# Patient Record
Sex: FEMALE | Race: BLACK OR AFRICAN AMERICAN | Employment: FULL TIME | URBAN - METROPOLITAN AREA
[De-identification: names, ages, dates, MRNs, and addresses within clinical notes are randomized per-mention and may not be internally consistent; named-entity substitution may affect disease eponyms.]

---

## 2018-08-08 ENCOUNTER — HOSPITAL ENCOUNTER (EMERGENCY)
Age: 23
Discharge: HOME | End: 2018-08-08
Attending: EMERGENCY MEDICINE
Payer: SELF-PAY

## 2018-08-08 VITALS
BODY MASS INDEX: 34.41 KG/M2 | RESPIRATION RATE: 18 BRPM | SYSTOLIC BLOOD PRESSURE: 137 MMHG | OXYGEN SATURATION: 99 % | TEMPERATURE: 98.5 F | DIASTOLIC BLOOD PRESSURE: 77 MMHG | HEART RATE: 90 BPM | HEIGHT: 62 IN | WEIGHT: 187 LBS

## 2018-08-08 DIAGNOSIS — N89.8 VAGINAL DISCHARGE: ICD-10-CM

## 2018-08-08 DIAGNOSIS — N39.0 URINARY TRACT INFECTION WITHOUT HEMATURIA, SITE UNSPECIFIED: ICD-10-CM

## 2018-08-08 DIAGNOSIS — N89.8 VAGINAL LESION: Primary | ICD-10-CM

## 2018-08-08 LAB
APPEARANCE UR: ABNORMAL
BACTERIA URNS QL MICRO: ABNORMAL /HPF
BILIRUB UR QL: NEGATIVE
CLUE CELLS VAG QL WET PREP: NORMAL
COLOR UR: ABNORMAL
EPITH CASTS URNS QL MICRO: ABNORMAL /LPF
GLUCOSE UR STRIP.AUTO-MCNC: NEGATIVE MG/DL
HCG UR QL: NEGATIVE
HGB UR QL STRIP: ABNORMAL
KETONES UR QL STRIP.AUTO: NEGATIVE MG/DL
KOH PREP SPEC: NORMAL
LEUKOCYTE ESTERASE UR QL STRIP.AUTO: ABNORMAL
MUCOUS THREADS URNS QL MICRO: ABNORMAL /LPF
NITRITE UR QL STRIP.AUTO: NEGATIVE
PH UR STRIP: 5.5 [PH] (ref 5–8)
PROT UR STRIP-MCNC: NEGATIVE MG/DL
RBC #/AREA URNS HPF: ABNORMAL /HPF (ref 0–5)
SERVICE CMNT-IMP: NORMAL
SP GR UR REFRACTOMETRY: 1.03 (ref 1–1.03)
T VAGINALIS VAG QL WET PREP: NORMAL
UA: UC IF INDICATED,UAUC: ABNORMAL
UROBILINOGEN UR QL STRIP.AUTO: 0.2 EU/DL (ref 0.2–1)
WBC URNS QL MICRO: ABNORMAL /HPF (ref 0–4)

## 2018-08-08 PROCEDURE — 87491 CHLMYD TRACH DNA AMP PROBE: CPT | Performed by: PHYSICIAN ASSISTANT

## 2018-08-08 PROCEDURE — 87147 CULTURE TYPE IMMUNOLOGIC: CPT | Performed by: PHYSICIAN ASSISTANT

## 2018-08-08 PROCEDURE — 36415 COLL VENOUS BLD VENIPUNCTURE: CPT | Performed by: PHYSICIAN ASSISTANT

## 2018-08-08 PROCEDURE — 87210 SMEAR WET MOUNT SALINE/INK: CPT | Performed by: PHYSICIAN ASSISTANT

## 2018-08-08 PROCEDURE — 81001 URINALYSIS AUTO W/SCOPE: CPT | Performed by: PHYSICIAN ASSISTANT

## 2018-08-08 PROCEDURE — 87086 URINE CULTURE/COLONY COUNT: CPT | Performed by: PHYSICIAN ASSISTANT

## 2018-08-08 PROCEDURE — 74011250637 HC RX REV CODE- 250/637: Performed by: PHYSICIAN ASSISTANT

## 2018-08-08 PROCEDURE — 87255 GENET VIRUS ISOLATE HSV: CPT | Performed by: PHYSICIAN ASSISTANT

## 2018-08-08 PROCEDURE — 81025 URINE PREGNANCY TEST: CPT

## 2018-08-08 PROCEDURE — 99284 EMERGENCY DEPT VISIT MOD MDM: CPT

## 2018-08-08 PROCEDURE — 74011250636 HC RX REV CODE- 250/636: Performed by: PHYSICIAN ASSISTANT

## 2018-08-08 PROCEDURE — 96372 THER/PROPH/DIAG INJ SC/IM: CPT

## 2018-08-08 PROCEDURE — 86592 SYPHILIS TEST NON-TREP QUAL: CPT | Performed by: PHYSICIAN ASSISTANT

## 2018-08-08 RX ORDER — FLUCONAZOLE 150 MG/1
150 TABLET ORAL
Qty: 1 TAB | Refills: 0 | Status: SHIPPED | OUTPATIENT
Start: 2018-08-08 | End: 2018-08-08

## 2018-08-08 RX ORDER — CEPHALEXIN 500 MG/1
500 CAPSULE ORAL 2 TIMES DAILY
Qty: 14 CAP | Refills: 0 | Status: SHIPPED | OUTPATIENT
Start: 2018-08-08 | End: 2018-08-15

## 2018-08-08 RX ORDER — AZITHROMYCIN 500 MG/1
1000 TABLET, FILM COATED ORAL
Status: COMPLETED | OUTPATIENT
Start: 2018-08-08 | End: 2018-08-08

## 2018-08-08 RX ORDER — ACYCLOVIR 800 MG/1
800 TABLET ORAL
Qty: 35 TAB | Refills: 0 | Status: SHIPPED | OUTPATIENT
Start: 2018-08-08 | End: 2018-08-15

## 2018-08-08 RX ADMIN — AZITHROMYCIN 1000 MG: 500 TABLET, FILM COATED ORAL at 20:34

## 2018-08-08 RX ADMIN — LIDOCAINE HYDROCHLORIDE 250 MG: 10 INJECTION, SOLUTION EPIDURAL; INFILTRATION; INTRACAUDAL; PERINEURAL at 20:34

## 2018-08-08 RX ADMIN — PENICILLIN G BENZATHINE 2.4 MILLION UNITS: 1200000 INJECTION, SUSPENSION INTRAMUSCULAR at 20:34

## 2018-08-08 NOTE — ED PROVIDER NOTES
EMERGENCY DEPARTMENT HISTORY AND PHYSICAL EXAM    Date: 8/8/2018  Patient Name: Adelita Boxer    History of Presenting Illness     Chief Complaint   Patient presents with    Vaginal Discharge     x few days         History Provided By: Patient      HPI: Adelita Boxer is a 21 y.o. female with a PMH of No significant past medical history who presents with acute moderate intermittent dysuria, vaginal discharge, vaginal odor, and possible genital lesion x 1 week secondary to unprotected intercourse with new male partner. Endorses concern for STI. No modifying factors. Denies fever, chills, n/v, abd pain, urinary frequency, headache, lightheadedness, dizziness, joint pain. PCP: None    Current Outpatient Prescriptions   Medication Sig Dispense Refill    acyclovir (ZOVIRAX) 800 mg tablet Take 1 Tab by mouth five (5) times daily for 7 days. 35 Tab 0    cephALEXin (KEFLEX) 500 mg capsule Take 1 Cap by mouth two (2) times a day for 7 days. 14 Cap 0       Past History     Past Medical History:  History reviewed. No pertinent past medical history. Past Surgical History:  History reviewed. No pertinent surgical history. Family History:  History reviewed. No pertinent family history. Social History:  Social History   Substance Use Topics    Smoking status: Current Some Day Smoker     Packs/day: 0.25    Smokeless tobacco: None    Alcohol use Yes      Comment: occassion. Allergies:  No Known Allergies      Review of Systems   Review of Systems   Constitutional: Negative. Negative for activity change, appetite change, chills and fever. HENT: Negative. Negative for congestion, ear pain, postnasal drip and sore throat. Eyes: Negative. Negative for pain and visual disturbance. Respiratory: Negative. Negative for cough and shortness of breath. Cardiovascular: Negative. Negative for chest pain.    Gastrointestinal: Negative for abdominal pain, anal bleeding, diarrhea, nausea, rectal pain and vomiting. Genitourinary: Positive for dysuria, genital sores, vaginal discharge and vaginal pain. Negative for difficulty urinating, flank pain, frequency, menstrual problem, pelvic pain, urgency and vaginal bleeding. Musculoskeletal: Negative. Negative for joint swelling. Skin: Negative. Negative for rash. Neurological: Negative. Negative for dizziness, light-headedness and headaches. Psychiatric/Behavioral: Negative. Physical Exam     Vitals:    08/08/18 1929   BP: 137/77   Pulse: 90   Resp: 18   Temp: 98.5 °F (36.9 °C)   SpO2: 99%   Weight: 84.8 kg (187 lb)   Height: 5' 2\" (1.575 m)     Physical Exam   Constitutional: She is oriented to person, place, and time. She appears well-developed and well-nourished. No distress. HENT:   Head: Normocephalic and atraumatic. Right Ear: External ear normal.   Left Ear: External ear normal.   Eyes: Conjunctivae and EOM are normal. Pupils are equal, round, and reactive to light. Neck: Normal range of motion. Pulmonary/Chest: Effort normal.   Abdominal: Soft. Bowel sounds are normal. There is no tenderness. Genitourinary: Uterus normal. Pelvic exam was performed with patient supine. There is no rash, tenderness, lesion or injury on the right labia. There is no rash, tenderness, lesion or injury on the left labia. Cervix exhibits no motion tenderness, no discharge and no friability. Right adnexum displays no mass, no tenderness and no fullness. Left adnexum displays no mass, no tenderness and no fullness. No erythema, tenderness or bleeding in the vagina. No foreign body in the vagina. No signs of injury around the vagina. Vaginal discharge found. Musculoskeletal: Normal range of motion. Neurological: She is alert and oriented to person, place, and time. Skin: Skin is warm and dry. She is not diaphoretic. Psychiatric: She has a normal mood and affect.  Her behavior is normal. Judgment and thought content normal.   Nursing note and vitals reviewed. Diagnostic Study Results     Labs -     Recent Results (from the past 12 hour(s))   HCG URINE, QL. - POC    Collection Time: 08/08/18  7:58 PM   Result Value Ref Range    Pregnancy test,urine (POC) NEGATIVE  NEG     URINALYSIS W/ REFLEX CULTURE    Collection Time: 08/08/18  8:13 PM   Result Value Ref Range    Color YELLOW/STRAW      Appearance CLOUDY (A) CLEAR      Specific gravity 1.030 1.003 - 1.030      pH (UA) 5.5 5.0 - 8.0      Protein NEGATIVE  NEG mg/dL    Glucose NEGATIVE  NEG mg/dL    Ketone NEGATIVE  NEG mg/dL    Bilirubin NEGATIVE  NEG      Blood TRACE (A) NEG      Urobilinogen 0.2 0.2 - 1.0 EU/dL    Nitrites NEGATIVE  NEG      Leukocyte Esterase MODERATE (A) NEG      WBC 10-20 0 - 4 /hpf    RBC 5-10 0 - 5 /hpf    Epithelial cells MODERATE (A) FEW /lpf    Bacteria 3+ (A) NEG /hpf    UA:UC IF INDICATED URINE CULTURE ORDERED (A) CNI      Mucus 1+ (A) NEG /lpf   WET PREP    Collection Time: 08/08/18  8:13 PM   Result Value Ref Range    Clue cells CLUE CELLS ABSENT      Wet prep NO TRICHOMONAS SEEN     KOH, OTHER SOURCES    Collection Time: 08/08/18  8:13 PM   Result Value Ref Range    Special Requests: NO SPECIAL REQUESTS      KOH NO YEAST SEEN         Radiologic Studies -   No orders to display     CT Results  (Last 48 hours)    None        CXR Results  (Last 48 hours)    None            Medical Decision Making   I am the first provider for this patient. I reviewed the vital signs, available nursing notes, past medical history, past surgical history, family history and social history. Vital Signs-Reviewed the patient's vital signs. Records Reviewed: Nursing Notes, Old Medical Records and Previous Laboratory Studies    ED Course:     Disposition:    DISCHARGE NOTE:   8:39 PM      Care plan outlined and precautions discussed. Patient has no new complaints, changes, or physical findings. Results of labs were reviewed with the patient.  All medications were reviewed with the patient; will d/c home with acyclovir and keflex. All of pt's questions and concerns were addressed. Patient was instructed and agrees to follow up with GYN, as well as to return to the ED upon further deterioration. Patient is ready to go home. Follow-up Information     Follow up With Details Comments 98 Radha Contreras NP Schedule an appointment as soon as possible for a visit in 1 week As needed, If symptoms worsen [address] [place] [address]  [phone]            Current Discharge Medication List      START taking these medications    Details   acyclovir (ZOVIRAX) 800 mg tablet Take 1 Tab by mouth five (5) times daily for 7 days. Qty: 35 Tab, Refills: 0      cephALEXin (KEFLEX) 500 mg capsule Take 1 Cap by mouth two (2) times a day for 7 days. Qty: 14 Cap, Refills: 0             Provider Notes (Medical Decision Making):   DDX: bv, vaginal candidiasis, sti, pid, uti, pregnancy    Procedures:  Procedures        Diagnosis     Clinical Impression:   1. Vaginal lesion    2. Vaginal discharge    3.  Urinary tract infection without hematuria, site unspecified

## 2018-08-09 LAB — RPR SER QL: NONREACTIVE

## 2018-08-09 NOTE — DISCHARGE INSTRUCTIONS
Urinary Tract Infection in Women: Care Instructions  Your Care Instructions    A urinary tract infection, or UTI, is a general term for an infection anywhere between the kidneys and the urethra (where urine comes out). Most UTIs are bladder infections. They often cause pain or burning when you urinate. UTIs are caused by bacteria and can be cured with antibiotics. Be sure to complete your treatment so that the infection goes away. Follow-up care is a key part of your treatment and safety. Be sure to make and go to all appointments, and call your doctor if you are having problems. It's also a good idea to know your test results and keep a list of the medicines you take. How can you care for yourself at home? · Take your antibiotics as directed. Do not stop taking them just because you feel better. You need to take the full course of antibiotics. · Drink extra water and other fluids for the next day or two. This may help wash out the bacteria that are causing the infection. (If you have kidney, heart, or liver disease and have to limit fluids, talk with your doctor before you increase your fluid intake.)  · Avoid drinks that are carbonated or have caffeine. They can irritate the bladder. · Urinate often. Try to empty your bladder each time. · To relieve pain, take a hot bath or lay a heating pad set on low over your lower belly or genital area. Never go to sleep with a heating pad in place. To prevent UTIs  · Drink plenty of water each day. This helps you urinate often, which clears bacteria from your system. (If you have kidney, heart, or liver disease and have to limit fluids, talk with your doctor before you increase your fluid intake.)  · Urinate when you need to. · Urinate right after you have sex. · Change sanitary pads often. · Avoid douches, bubble baths, feminine hygiene sprays, and other feminine hygiene products that have deodorants.   · After going to the bathroom, wipe from front to back.  When should you call for help? Call your doctor now or seek immediate medical care if:    · Symptoms such as fever, chills, nausea, or vomiting get worse or appear for the first time.     · You have new pain in your back just below your rib cage. This is called flank pain.     · There is new blood or pus in your urine.     · You have any problems with your antibiotic medicine.    Watch closely for changes in your health, and be sure to contact your doctor if:    · You are not getting better after taking an antibiotic for 2 days.     · Your symptoms go away but then come back. Where can you learn more? Go to http://kyle-nohemi.info/. Enter F065 in the search box to learn more about \"Urinary Tract Infection in Women: Care Instructions. \"  Current as of: May 12, 2017  Content Version: 11.7  © 0018-9324 [employer]. Care instructions adapted under license by [employer] (which disclaims liability or warranty for this information). If you have questions about a medical condition or this instruction, always ask your healthcare professional. [place] [address] any warranty or liability for your use of this information. Genital Herpes: Care Instructions  Your Care Instructions  Genital herpes is caused by a virus called herpes simplex. There are two types of this virus. Type 2 is the type that usually causes genital herpes. But type 1 can also cause it. Type 1 is the type that causes cold sores. Genital herpes is a sexually transmitted infection (STI). The most common way to get it is through sexual or other contact with someone who has herpes. For example, the virus can spread from a sore in the genital area to the lips. And it can spread from a cold sore on the lips to the genital area. Some people are surprised to find out that they have herpes or that they gave it to someone else.  This is because a lot of people who have it don't know that they have it. They may not get sores or they may have sores that they cannot see. But the virus can still be spread by a person who does not have obvious sores or symptoms. There is no cure for herpes, but antiviral medicine can help you feel better and help prevent more outbreaks. This medicine may also lower the chance of spreading the virus. Follow-up care is a key part of your treatment and safety. Be sure to make and go to all appointments, and call your doctor if you are having problems. It's also a good idea to know your test results and keep a list of the medicines you take. How can you care for yourself at home? · Be safe with medicines. If your doctor prescribes medicine, take it exactly as prescribed. Call your doctor if you think you are having a problem with your medicine. You will get more details on the specific medicines your doctor prescribes. · To reduce the pain and itching from herpes sores:  ¨ Wash the area with water 3 or 4 times a day. ¨ Keep the sores clean and dry in between baths or showers. You may want to let the sores air dry. This may feel better than a towel. ¨ Wear cotton underwear. Cotton absorbs moisture well. ¨ Take an over-the-counter pain medicine, such as acetaminophen (Tylenol), ibuprofen (Advil, Motrin), or naproxen (Aleve). Read and follow all instructions on the label. Do not take two or more pain medicines at the same time unless the doctor told you to. Many pain medicines have acetaminophen, which is Tylenol. Too much acetaminophen (Tylenol) can be harmful. To prevent the spread of genital herpes  · Latex condoms are a good way to reduce the risk of spreading the virus. But you can still get the virus even if you use a condom. For the best protection, use condoms every time you have sex, from the beginning to the end of sexual contact. Remember that you can infect someone even if you do not have obvious symptoms or sores.   · Avoid sexual contact when you have symptoms. Symptoms include sores, tingling, or pain. · Wash your hands if you touch a sore. In some cases, especially if this is your first herpes outbreak, you can spread the virus to other parts of your body or to other people. · Having one sex partner (who does not have STIs and does not have sex with anyone else) is a good way to avoid STIs. · Talk to your sex partner or partners about genital herpes. · Encourage your sex partners to get a blood test to see if they have been infected. When should you call for help? Call your doctor now or seek immediate medical care if:    · You have a new fever.     · There is increasing redness or red streaks around herpes sores.    Watch closely for changes in your health, and be sure to contact your doctor if:    · You have herpes and you think you might be pregnant.     · You have an outbreak of herpes sores, and the sores are not healing.     · You have frequent outbreaks of genital herpes sores.     · You are unable to pass urine or are constipated.     · You want to start antiviral medicine.     · You do not get better as expected. Where can you learn more? Go to http://kyle-nohemi.info/. Enter V726 in the search box to learn more about \"Genital Herpes: Care Instructions. \"  Current as of: November 27, 2017  Content Version: 11.7  © 5439-9480 [employer]. Care instructions adapted under license by [employer] (which disclaims liability or warranty for this information). If you have questions about a medical condition or this instruction, always ask your healthcare professional. [place] [address] any warranty or liability for your use of this information.

## 2018-08-09 NOTE — ED NOTES
Patient presents to ED with c/o vaginal discharge. Patient states that she has been having vaginal discharge for 2 days. Patient also states that she felt a lesion inside vagina. Concerned for STD and want to be tested for herpes and syphilis. Emergency Department Nursing Plan of Care       The Nursing Plan of Care is developed from the Nursing assessment and Emergency Department Attending provider initial evaluation. The plan of care may be reviewed in the ED Provider note.     The Plan of Care was developed with the following considerations:   Patient / Family readiness to learn indicated by:verbalized understanding  Persons(s) to be included in education: patient  Barriers to Learning/Limitations:No    Signed     Carrie Simon RN    8/8/2018   8:03 PM

## 2018-08-10 LAB
BACTERIA SPEC CULT: ABNORMAL
BACTERIA SPEC CULT: ABNORMAL
C TRACH DNA SPEC QL NAA+PROBE: NEGATIVE
CC UR VC: ABNORMAL
N GONORRHOEA DNA SPEC QL NAA+PROBE: NEGATIVE
SAMPLE TYPE: NORMAL
SERVICE CMNT-IMP: ABNORMAL
SERVICE CMNT-IMP: NORMAL
SPECIMEN SOURCE: NORMAL

## 2018-08-12 LAB
HSV SPEC CULT: ABNORMAL
SPECIMEN SOURCE: ABNORMAL

## 2018-08-12 NOTE — PROGRESS NOTES
Spoke with the pt and advised her of her results. She was given acyclovir on d/c. She said she would f/u w/ the pcp provided w/ her d/c paperwork. I told her to have her partners tested if they have a similar rash and she agreed. I also explained that she is contagious while she has this lesion and she acknowledged this.

## 2018-08-29 ENCOUNTER — HOSPITAL ENCOUNTER (EMERGENCY)
Age: 23
End: 2018-08-29
Attending: EMERGENCY MEDICINE
Payer: SELF-PAY

## 2018-08-29 PROCEDURE — 75810000275 HC EMERGENCY DEPT VISIT NO LEVEL OF CARE

## 2019-10-30 ENCOUNTER — HOSPITAL ENCOUNTER (EMERGENCY)
Age: 24
Discharge: HOME | End: 2019-10-30
Attending: EMERGENCY MEDICINE
Payer: SELF-PAY

## 2019-10-30 VITALS
RESPIRATION RATE: 20 BRPM | HEART RATE: 90 BPM | BODY MASS INDEX: 33.86 KG/M2 | DIASTOLIC BLOOD PRESSURE: 75 MMHG | WEIGHT: 184 LBS | OXYGEN SATURATION: 100 % | HEIGHT: 62 IN | TEMPERATURE: 98.3 F | SYSTOLIC BLOOD PRESSURE: 128 MMHG

## 2019-10-30 DIAGNOSIS — J02.0 ACUTE STREPTOCOCCAL PHARYNGITIS: Primary | ICD-10-CM

## 2019-10-30 DIAGNOSIS — M25.562 ARTHRALGIA OF BOTH LOWER LEGS: ICD-10-CM

## 2019-10-30 DIAGNOSIS — M25.561 ARTHRALGIA OF BOTH LOWER LEGS: ICD-10-CM

## 2019-10-30 PROCEDURE — 99282 EMERGENCY DEPT VISIT SF MDM: CPT

## 2019-10-30 RX ORDER — AMOXICILLIN 500 MG/1
500 TABLET, FILM COATED ORAL 3 TIMES DAILY
Qty: 30 TAB | Refills: 0 | Status: SHIPPED | OUTPATIENT
Start: 2019-10-30 | End: 2019-11-09

## 2019-10-30 RX ORDER — IBUPROFEN 800 MG/1
800 TABLET ORAL
Qty: 20 TAB | Refills: 0 | Status: SHIPPED | OUTPATIENT
Start: 2019-10-30 | End: 2019-11-06

## 2019-10-30 RX ORDER — CYCLOBENZAPRINE HCL 10 MG
10 TABLET ORAL
Qty: 20 TAB | Refills: 0 | Status: SHIPPED | OUTPATIENT
Start: 2019-10-30 | End: 2020-04-28

## 2019-10-30 NOTE — LETTER
[facility] EMERGENCY DEPT 
[address] [address] 
[phone] Work/School Note Date: 10/30/2019 To Whom It May concern: 
 
Surya Alvarado was seen and treated today in the emergency room by the following provider(s): 
Attending Provider: Lisa You MD 
Physician Assistant: MARC Lomas. Surya Alvarado may return to work on 11/2/2019. Sincerely, 
 
 
 
 
Gilma Jacobs  [place], [facility] [place]

## 2019-10-31 NOTE — ED TRIAGE NOTES
Pt comes in with c/o generalized body aches (mostly in bilateral legs) and L-sided sore throat since this AM. Pt denies vomiting, diarrhea, or fever. Pt works 2 jobs and had to miss one today.

## 2019-10-31 NOTE — DISCHARGE INSTRUCTIONS
Patient Education        Joint Pain: Care Instructions  Your Care Instructions    Many people have small aches and pains from overuse or injury to muscles and joints. Joint injuries often happen during sports or recreation, work tasks, or projects around the home. An overuse injury can happen when you put too much stress on a joint or when you do an activity that stresses the joint over and over, such as using the computer or rowing a boat. You can take action at home to help your muscles and joints get better. You should feel better in 1 to 2 weeks, but it can take 3 months or more to heal completely. Follow-up care is a key part of your treatment and safety. Be sure to make and go to all appointments, and call your doctor if you are having problems. It's also a good idea to know your test results and keep a list of the medicines you take. How can you care for yourself at home? · Do not put weight on the injured joint for at least a day or two. · For the first day or two after an injury, do not take hot showers or baths, and do not use hot packs. The heat could make swelling worse. · Put ice or a cold pack on the sore joint for 10 to 20 minutes at a time. Try to do this every 1 to 2 hours for the next 3 days (when you are awake) or until the swelling goes down. Put a thin cloth between the ice and your skin. · Wrap the injury in an elastic bandage. Do not wrap it too tightly because this can cause more swelling. · Prop up the sore joint on a pillow when you ice it or anytime you sit or lie down during the next 3 days. Try to keep it above the level of your heart. This will help reduce swelling. · Take an over-the-counter pain medicine, such as acetaminophen (Tylenol), ibuprofen (Advil, Motrin), or naproxen (Aleve). Read and follow all instructions on the label. · After 1 or 2 days of rest, begin moving the joint gently.  While the joint is still healing, you can begin to exercise using activities that do not strain or hurt the painful joint. When should you call for help? Call your doctor now or seek immediate medical care if:    · You have signs of infection, such as:  ? Increased pain, swelling, warmth, and redness. ? Red streaks leading from the joint. ? A fever.    Watch closely for changes in your health, and be sure to contact your doctor if:    · Your movement or symptoms are not getting better after 1 to 2 weeks of home treatment. Where can you learn more? Go to http://kyle-nohemi.info/. Enter P205 in the search box to learn more about \"Joint Pain: Care Instructions. \"  Current as of: June 26, 2019  Content Version: 12.2  © 6754-9306 [employer]. Care instructions adapted under license by [employer] (which disclaims liability or warranty for this information). If you have questions about a medical condition or this instruction, always ask your healthcare professional. [place] [address] any warranty or liability for your use of this information. Patient Education        Strep Throat: Care Instructions  Your Care Instructions    Strep throat is a bacterial infection that causes sudden, severe sore throat and fever. Strep throat, which is caused by bacteria called streptococcus, is treated with antibiotics. Sometimes a strep test is necessary to tell if the sore throat is caused by strep bacteria. Treatment can help ease symptoms and may prevent future problems. Follow-up care is a key part of your treatment and safety. Be sure to make and go to all appointments, and call your doctor if you are having problems. It's also a good idea to know your test results and keep a list of the medicines you take. How can you care for yourself at home? · Take your antibiotics as directed. Do not stop taking them just because you feel better. You need to take the full course of antibiotics.   · Strep throat can spread to others until 24 hours after you begin taking antibiotics. During this time, you should avoid contact with other people at work or home, especially infants and children. Do not sneeze or cough on others, and wash your hands often. Keep your drinking glass and eating utensils separate from those of others, and wash these items well in hot, soapy water. · Gargle with warm salt water at least once each hour to help reduce swelling and make your throat feel better. Use 1 teaspoon of salt mixed in 8 fluid ounces of warm water. · Take an over-the-counter pain medication, such as acetaminophen (Tylenol), ibuprofen (Advil, Motrin), or naproxen (Aleve). Read and follow all instructions on the label. · Try an over-the-counter anesthetic throat spray or throat lozenges, which may help relieve throat pain. · Drink plenty of fluids. Fluids may help soothe an irritated throat. Hot fluids, such as tea or soup, may help your throat feel better. · Eat soft solids and drink plenty of clear liquids. Flavored ice pops, ice cream, scrambled eggs, sherbet, and gelatin dessert (such as Jell-O) may also soothe the throat. · Get lots of rest.  · Do not smoke, and avoid secondhand smoke. If you need help quitting, talk to your doctor about stop-smoking programs and medicines. These can increase your chances of quitting for good. · Use a vaporizer or humidifier to add moisture to the air in your bedroom. Follow the directions for cleaning the machine. When should you call for help?   Call your doctor now or seek immediate medical care if:    · You have a new or higher fever.     · You have a fever with a stiff neck or severe headache.     · You have new or worse trouble swallowing.     · Your sore throat gets much worse on one side.     · Your pain becomes much worse on one side of your throat.    Watch closely for changes in your health, and be sure to contact your doctor if:    · You are not getting better after 2 days (48 hours).     · You do not get better as expected. Where can you learn more? Go to http://kyle-nohemi.info/. Enter K625 in the search box to learn more about \"Strep Throat: Care Instructions. \"  Current as of: October 21, 2018  Content Version: 12.2  © 9040-4506 [employer]. Care instructions adapted under license by [employer] (which disclaims liability or warranty for this information). If you have questions about a medical condition or this instruction, always ask your healthcare professional. [address] any warranty or liability for your use of this information.

## 2019-10-31 NOTE — ED NOTES
Ambulatory patient arrives with complaints of sore throat and missed work. She additionally reports her legs have been sore. Pt stable. Call bell in reach. Safety as documented. Emergency Department Nursing Plan of Care       The Nursing Plan of Care is developed from the Nursing assessment and Emergency Department Attending provider initial evaluation. The plan of care may be reviewed in the ED Provider note.     The Plan of Care was developed with the following considerations:   Patient / Family readiness to learn indicated by:verbalized understanding  Persons(s) to be included in education: patient  Barriers to Learning/Limitations:Erika Barber, RN    10/30/2019   9:24 PM

## 2019-10-31 NOTE — ED PROVIDER NOTES
EMERGENCY DEPARTMENT HISTORY AND PHYSICAL EXAM      Date: 10/30/2019  Patient Name: Glen Sutherland    Please note that this dictation was completed with ScoreGrid, the computer voice recognition software. Quite often unanticipated grammatical, syntax, homophones, and other interpretive errors are inadvertently transcribed by the computer software. Please disregard these errors. Please excuse any errors that have escaped final proofreading. History of Presenting Illness     Chief Complaint   Patient presents with    Sore Throat       History Provided By: Patient    HPI: Glen Sutherland, 25 y.o. female with PMHx significant for tobacco abuse, presents ambulatory to the ED with cc of sore throat x 2 days. Pt sates that her boyfriend is sick with similar symptoms. She states that her pain worsens when she swallows. Denies medication PTA. Denies nausea, vomiting, voice changes, fever, chills. Pt also reports ongoing chronic bilateral leg pain x 1 year or more. Pt states that her pain occurs every morning when she initially gets out of bed and improves throughout the day with activity. She states that she has been working two jobs for the last two years and is required to be on her feet the whole time while at work. Denies med PTA for this issue. Of note, pt states that her LMP was 2 days ago. PCP: None    There are no other complaints, changes, or physical findings at this time. Current Outpatient Medications   Medication Sig Dispense Refill    amoxicillin 500 mg tab Take 500 mg by mouth three (3) times daily for 10 days. 30 Tab 0    cyclobenzaprine (FLEXERIL) 10 mg tablet Take 1 Tab by mouth three (3) times daily as needed for Muscle Spasm(s). 20 Tab 0    ibuprofen (MOTRIN) 800 mg tablet Take 1 Tab by mouth every six (6) hours as needed for Pain for up to 7 days. 20 Tab 0       Past History     Past Medical History:  No past medical history on file.     Past Surgical History:  No past surgical history on file. Family History:  No family history on file. Social History:  Social History     Tobacco Use    Smoking status: Current Some Day Smoker     Packs/day: 0.25   Substance Use Topics    Alcohol use: Yes     Comment: occassion.  Drug use: No       Allergies:  No Known Allergies      Review of Systems   Review of Systems   Constitutional: Negative. Negative for activity change, appetite change, chills and fever. HENT: Positive for sore throat. Negative for rhinorrhea, trouble swallowing and voice change. Eyes: Negative for pain and visual disturbance. Respiratory: Negative for cough and shortness of breath. Cardiovascular: Negative for chest pain and palpitations. Gastrointestinal: Negative for abdominal pain, diarrhea, nausea and vomiting. Genitourinary: Negative for dysuria and hematuria. Musculoskeletal: Positive for arthralgias and myalgias. Skin: Negative for color change, rash and wound. Neurological: Negative for dizziness, numbness and headaches. All other systems reviewed and are negative. Physical Exam   Physical Exam   Constitutional: She is oriented to person, place, and time. Vital signs are normal. She appears well-developed and well-nourished. No distress. 25 y.o. female in NAD  Communicates appropriately and in full sentences  Normal vital signs   HENT:   Head: Normocephalic and atraumatic. Bilateral tonsill(s) enlarged with erythema and exudates  Currently tolerating secretions without trismus or stridor  No acute voice changes  Uvula midline without evidence of RPA or PTA   Eyes: Pupils are equal, round, and reactive to light. Conjunctivae are normal. Right eye exhibits no discharge. Left eye exhibits no discharge. Neck: Normal range of motion. Neck supple. No nuchal rigidity   Cardiovascular: Normal rate, regular rhythm and intact distal pulses. Pulmonary/Chest: Effort normal and breath sounds normal. No respiratory distress. She has no wheezes. Abdominal: Soft. Bowel sounds are normal. She exhibits no distension. There is no tenderness. Musculoskeletal: Normal range of motion. She exhibits no edema, tenderness or deformity. No neurologic or vascular compromise on exam.   No bony TTP of bilateral lower legs   Neurological: She is alert and oriented to person, place, and time. Coordination normal.   Skin: Skin is warm and dry. She is not diaphoretic. No erythema. Psychiatric: She has a normal mood and affect. Nursing note and vitals reviewed. Diagnostic Study Results     No formal testing initiated. Medical Decision Making   I am the first provider for this patient. I reviewed the vital signs, available nursing notes, past medical history, past surgical history, family history and social history. Vital Signs-Reviewed the patient's vital signs. Patient Vitals for the past 12 hrs:   Temp Pulse Resp BP SpO2   10/30/19 2043 98.8 °F (37.1 °C) 89 18 (!) 125/94 100 %         Records Reviewed: Nursing Notes and Old Medical Records    Provider Notes (Medical Decision Making):   DDx: bacterial vs. viral pharyngitis, viral syndrome, upper respiratory infection No evidence of retropharyngeal abscess or peritonsillar abscess. Pt presents with sore throat with tonsillar exudates and no fever. Also reporting chronic leg pain that she only experiences in the morning x 1 year or more. Pt denies meds PTA. Denies injury or trauma. Will treat symptomatically and urge close follow-up. Pt expresses understanding. Provided customary ED return precautions. ED Course:   Initial assessment performed. The patients presenting problems have been discussed, and they are in agreement with the care plan formulated and outlined with them. I have encouraged them to ask questions as they arise throughout their visit. DISCHARGE NOTE:  Ken Kent  results have been reviewed with her. She has been counseled regarding her diagnosis.   She verbally conveys understanding and agreement of the signs, symptoms, diagnosis, treatment and prognosis and additionally agrees to follow up as recommended with Dr. None in 24 - 48 hours. She also agrees with the care-plan and conveys that all of her questions have been answered. I have also put together some discharge instructions for her that include: 1) educational information regarding their diagnosis, 2) how to care for their diagnosis at home, as well a 3) list of reasons why they would want to return to the ED prior to their follow-up appointment, should their condition change. She and/or family's questions have been answered. I have encouraged them to see the official results in [facility]\" or to retrieve the specifics of their results from medical records. PLAN:  1. Return precautions as discussed  2. Follow-up with providers as directed  3. Medications as prescribed    Return to ED if worse     Diagnosis     Clinical Impression:   1. Acute streptococcal pharyngitis    2. Arthralgia of both lower legs        Current Discharge Medication List      START taking these medications    Details   amoxicillin 500 mg tab Take 500 mg by mouth three (3) times daily for 10 days. Qty: 30 Tab, Refills: 0      cyclobenzaprine (FLEXERIL) 10 mg tablet Take 1 Tab by mouth three (3) times daily as needed for Muscle Spasm(s). Qty: 20 Tab, Refills: 0      ibuprofen (MOTRIN) 800 mg tablet Take 1 Tab by mouth every six (6) hours as needed for Pain for up to 7 days. Qty: 20 Tab, Refills: 0             Follow-up Information     Follow up With Specialties Details Why [address] [facility] EMERGENCY DEPT Emergency Medicine Go to If symptoms worsen Daniel Aragon    [facility]  Call If symptoms worsen [address]  [phone]              This note will not be viewable in [address]

## 2020-04-28 ENCOUNTER — HOSPITAL ENCOUNTER (EMERGENCY)
Age: 25
Discharge: HOME | End: 2020-04-28
Attending: EMERGENCY MEDICINE

## 2020-04-28 VITALS
OXYGEN SATURATION: 100 % | DIASTOLIC BLOOD PRESSURE: 74 MMHG | HEART RATE: 82 BPM | SYSTOLIC BLOOD PRESSURE: 126 MMHG | WEIGHT: 198 LBS | BODY MASS INDEX: 36.44 KG/M2 | HEIGHT: 62 IN | TEMPERATURE: 99.3 F | RESPIRATION RATE: 18 BRPM

## 2020-04-28 DIAGNOSIS — R05.9 COUGH: Primary | ICD-10-CM

## 2020-04-28 DIAGNOSIS — R51.9 NONINTRACTABLE HEADACHE, UNSPECIFIED CHRONICITY PATTERN, UNSPECIFIED HEADACHE TYPE: ICD-10-CM

## 2020-04-28 PROCEDURE — 99283 EMERGENCY DEPT VISIT LOW MDM: CPT

## 2020-04-28 NOTE — ED NOTES
Pt presents to ED ambulatory complaining of headache x2 days. Pt reports taking tylenol with some relief of symptoms, reports she needs to make sure she is okay to go to work. Pt is alert and oriented x 4, RR even and unlabored, skin is warm and dry. Assessment completed and pt updated on plan of care. Emergency Department Nursing Plan of Care       The Nursing Plan of Care is developed from the Nursing assessment and Emergency Department Attending provider initial evaluation. The plan of care may be reviewed in the ED Provider note.     The Plan of Care was developed with the following considerations:   Patient / Family readiness to learn indicated by:verbalized understanding  Persons(s) to be included in education: patient  Barriers to Learning/Limitations:No    Signed     Caitlin Heller RN    4/28/2020   1:40 PM

## 2020-04-28 NOTE — ED PROVIDER NOTES
EMERGENCY DEPARTMENT HISTORY AND PHYSICAL EXAM      Date: 4/28/2020  Patient Name: Ezequiel Cazares    History of Presenting Illness     Chief Complaint   Patient presents with    Headache       History Provided By: Patient    HPI: Ezequiel Cazares, 22 y.o. female with PMHx as noted below presents the emergency department with chief complaint of cough, headache and concern for COVID-19. Patient states she works in [employer] and has been having some cough, congestion and intermittent headaches for the last 2 days. Patient has no known sick contacts. She reports the headache is mild, dull intermittent, no headache currently. She has no visual changes or focal neurologic deficits. Also denying any neck stiffness. She has had no fevers at home and denies any purulent sputum production. Also denying any dyspnea at this time. PCP: None        Past History     Past Medical History:  History reviewed. No pertinent past medical history. Past Surgical History:  History reviewed. No pertinent surgical history. Family History:  History reviewed. No pertinent family history. Social History:  Social History     Tobacco Use    Smoking status: Current Some Day Smoker     Packs/day: 0.25    Smokeless tobacco: Never Used   Substance Use Topics    Alcohol use: Yes     Comment: occassion.  Drug use: No       Allergies:  No Known Allergies      Review of Systems   Review of Systems  Constitutional: Negative for fever, chills. Positive fatigue. HENT: Negative for congestion, sore throat, rhinorrhea, sneezing and neck stiffness   Eyes: Negative for discharge and redness. Respiratory: Negative for  shortness of breath, wheezing   Cardiovascular: Negative for chest pain, palpitations   Gastrointestinal: Negative for nausea, vomiting, abdominal pain, constipation, diarrhea and blood in stool.    Genitourinary: Negative for dysuria, hematuria, flank pain, decreased urine volume, discharge,   Musculoskeletal: Negative for myalgias or joint pain . Skin: Negative for rash or lesions . Neurological: Negative weakness, light-headedness, numbness. Positive headaches. Physical Exam   Physical Exam    GENERAL: alert and oriented, no acute distress  EYES: PEERL, No injection, discharge or icterus. ENT: Mucous membranes pink and moist.  NECK: Supple  LUNGS: Airway patent. Non-labored respirations. Breath sounds clear with good air entry bilaterally. HEART: Regular rate and rhythm. No peripheral edema  ABDOMEN: Non-distended and non-tender, without guarding or rebound. SKIN:  warm, dry  MSK/EXTREMITIES: Without swelling, tenderness or deformity, symmetric with normal ROM  NEUROLOGICAL:  alert and oriented x 3, CN II-XII grossly intact, strength 5/5 bilateral upper and lower extremities, sensation intact throughout to light touch, no dysmetria or ataxia noted        Diagnostic Study Results     Labs -   No results found for this or any previous visit (from the past 12 hour(s)). Radiologic Studies -   No orders to display     CT Results  (Last 48 hours)    None        CXR Results  (Last 48 hours)    None            Medical Decision Making   I, Tony Dailey MD am the first provider for this patient and am the attending of record for this patient encounter. I reviewed the vital signs, available nursing notes, past medical history, past surgical history, family history and social history. Vital Signs-Reviewed the patient's vital signs. Patient Vitals for the past 12 hrs:   Temp Pulse Resp BP SpO2   04/28/20 1304  82 18 126/74 100 %   04/28/20 1125 99.3 °F (37.4 °C) 78 18 130/85 99 %       Records Reviewed: Nursing Notes and Old Medical Records    Provider Notes (Medical Decision Making): On presentation, the patient is well appearing, in no acute distress with reassuring vital signs. Based on my history and exam the differential diagnosis for this patient includes COVID 19, URI, sinusitis, bronchitis.  Nothing to suggest strep pharyngitis, PNA or bacterial sinusitis. Patient may have COVID 19, however is breathing comfortably, maintaining adequate O2 sats on room air and is overall well appearing so does not require admission at this time. They have had no recent travel, contact with a COVID + patient and have no conditoins that would place them at high risk for more severe disease so will not test at this time so will not obtain formal test per our facility policy. Have recommended self quarantine per CDC guidelines. Symptomatic therapy suggested. Should return immediately to the emergency department develops shortness of breath, confusion, weakness or any othe new/worrisome symptoms     ED Course:   Initial assessment performed. The patients presenting problems have been discussed, and they are in agreement with the care plan formulated and outlined with them. I have encouraged them to ask questions as they arise throughout their visit. PROGRESS  Tawana Garland  results have been reviewed with her. She has been counseled regarding her diagnosis. She verbally conveys understanding and agreement of the signs, symptoms, diagnosis, treatment and prognosis and additionally agrees to follow up as recommended. She also agrees with the care-plan and conveys that all of her questions have been answered. I have also put together some discharge instructions for her that include: 1) educational information regarding their diagnosis, 2) how to care for their diagnosis at home, as well a 3) list of reasons why they would want to return to the ED prior to their follow-up appointment, should their condition change. Disposition:  home    PLAN:  1. There are no discharge medications for this patient.     2.   Follow-up Information     Follow up With Specialties Details Why [address] [facility] EMERGENCY DEPT Emergency Medicine  If symptoms worsen Daniel Aragon    Primary [address] Internal Medicine Schedule an appointment as soon as possible for a visit in 3 days  [address]  [phone]        Return to ED if worse     Diagnosis     Clinical Impression:   1. Cough    2. Nonintractable headache, unspecified chronicity pattern, unspecified headache type        Please note that this dictation was completed with Dragon, computer voice recognition software. Quite often unanticipated grammatical, syntax, homophones, and other interpretive errors are inadvertently transcribed by the computer software. Please disregard these errors. Additionally, please excuse any errors that have escaped final proofreading.

## 2020-04-28 NOTE — LETTER
[address] 
[facility] EMERGENCY DEPT 
[address] [address] 
[phone] Work/School Note Date: 4/28/2020 To Whom It May concern: 
 
Artemio Lewis was seen and treated today in the emergency room by the following provider(s): 
Attending Provider: Jessica Coburn MD.   
 
Artemio Lewis should self-isolate at home until 72 hours (3 days) after your symptoms have completely resolved AND 7 days after your symptoms first started, whichever is later.  
 
Sincerely, 
 
 
 
 
Rosalina Jj MD

## 2020-04-28 NOTE — DISCHARGE INSTRUCTIONS
Patient Education   Prevention steps for patients with confirmed or suspected COVID-19 who do not need to be hospitalized    Timing for Self-Isolation    If you have been exposed but do not have symptoms:  If you suspect you have been exposed to someone with COVID-19 and don't have any symptoms, you should self-isolate at home for 14 days from the time of exposure. If you have symptoms whether or not you have been tested: If you have symptoms suggestive of COVID-19, such as fever or cough, regardless of whether you have been tested, you should self-isolate at home until 72 hours (3 days) after your symptoms have completely resolved AND 7 days after your symptoms first started, whichever is later. How to Properly Self-Isolate Due to COVID-19    Stay home except to get medical care  People who are mildly ill with COVID-19 are able to isolate at home during their illness. You should restrict activities outside your home, except for getting medical care. Do not go to work, school, or public areas. Avoid using public transportation, ride-sharing, or taxis. If you or a loved one must go out, please make sure to wash hands properly immediately on returning home. Disinfect touched surfaces. Do not touch your face. Separate yourself from other people and animals in your home  People: As much as possible, you should stay in a specific room and away from other people in your home. Also, you should use a separate bathroom, if available. Animals: You should restrict contact with pets and other animals while you are sick with COVID-19, just like you would around other people. Although there have not been reports of pets or other animals becoming sick with COVID-19, it is still recommended that people sick with COVID-19 limit contact with animals until more information is known about the virus. When possible, have another member of your household care for your animals while you are sick.  If you are sick with COVID-19, avoid contact with your pet, including petting, snuggling, being kissed or licked, and sharing food. If you must care for your pet or be around animals while you are sick, wash your hands before and after you interact with pets and wear a facemask. Call ahead before visiting your doctor  If you have a medical appointment, call the healthcare provider and tell them that you have or may have COVID-19. This will help the healthcare providers office take steps to keep other people from getting infected or exposed. Wear a facemask  You should wear a facemask when you are around other people (e.g., sharing a room or vehicle) or pets and before you enter a healthcare providers office. If you are not able to wear a facemask (for example, because it causes trouble breathing), then people who live with you should not stay in the same room with you, or they should wear a facemask if they enter your room. Cover your coughs and sneezes  Cover your mouth and nose with a tissue when you cough or sneeze. Throw used tissues in a lined trash can. Immediately wash your hands with soap and water for at least 20 seconds or, if soap and water are not available, clean your hands with an alcohol-based hand  that contains at least 60% alcohol. Clean your hands often  Wash your hands often with soap and water for at least 20 seconds, especially after blowing your nose, coughing, or sneezing; going to the bathroom; and before eating or preparing food. If soap and water are not readily available, use an alcohol-based hand  with at least 60% alcohol, covering all surfaces of your hands and rubbing them together until they feel dry. Soap and water are the best option if hands are visibly dirty. Avoid touching your eyes, nose, and mouth with unwashed hands.     Avoid sharing personal household items  You should not share dishes, drinking glasses, cups, eating utensils, towels, or bedding with other people or pets in your home. After using these items, they should be washed thoroughly with soap and water. Clean all high-touch surfaces everyday  High touch surfaces include counters, tabletops, doorknobs, bathroom fixtures, toilets, phones, keyboards, tablets, and bedside tables. Also, clean any surfaces that may have blood, stool, or body fluids on them. Use a household cleaning spray or wipe, according to the label instructions. Labels contain instructions for safe and effective use of the cleaning product including precautions you should take when applying the product, such as wearing gloves and making sure you have good ventilation during use of the product. Monitor your symptoms  Seek prompt medical attention if your illness is worsening (e.g., difficulty breathing). Before seeking care, call your healthcare provider and tell them that you have, or are being evaluated for, COVID-19. Put on a facemask before you enter the facility. These steps will help the healthcare providers office to keep other people in the office or waiting room from getting infected or exposed. Ask your healthcare provider to call the local or [facility] health department. Persons who are placed under active monitoring or facilitated self-monitoring should follow instructions provided by their local health department or occupational health professionals, as appropriate. When working with your local health department check their available hours. If you have a medical emergency and need to call 911, notify the dispatch personnel that you have, or are being evaluated for COVID-19. If possible, put on a facemask before emergency medical services arrive. Discontinuing home isolation  Patients with confirmed COVID-19 should remain under home isolation precautions until the risk of secondary transmission to others is thought to be low based on the above CDC recommendations.  The decision to discontinue home isolation precautions should be made on a case-by-case basis, in consultation with healthcare providers and state and local health departments. Oupatient testing  You can now get tested on an outpatient basis if you are showing symptoms of Covid19 at one of the [facility] or [facility] by appointment only. BETTER MED:  [email or web address] to make an appointment. [facility]IENT [facility]: Sophia. to make an appointment. This service is currently offered at the [facility] and [place] [employer] [facility] [employer]. To make an appointment, call your preferred Center and enter 5 during the recording to speak with the [id number]. All Patient [facility], including the [facility], remain Open Every Day for Walk-in care of Illness and Injury. Who can be tested? In order to make an appointment to be tested, you must meet screening criteria, which are based on CDC guidance. The screening criteria include either of the following conditions:   You have a symptom or symptoms of COVID-19 (fever, coughing, shortness of breath, sore throat).  You are a healthcare worker or . What is the cost?  For insured patients, there is no out-of-pocket expense. The visit will be submitted to patients' insurance. [facility] accepts all major insurance plans, including Medicare and Medicaid. For self-pay patients, the cost is $90 for the exam plus a separate bill from the lab, which is $51.31 in [place]. What is the process for being tested? First, make an appointment by calling your local Designated Patient Sonja Carpenter. Please have your insurance card on hand when you call. Bring your insurance card and picture ID with you to your appointment. Upon arrival, follow the Coronavirus Testing signs and park in a designated testing parking spot.  A staff member will meet you at your car to verify your information, complete your registration, check vitals, and take a sample for testing. The sample will be sent to a reference lab for testing. Self-quarantine until you receive your results. A nurse will call you once your results are available, and will provide you with guidance and answer any questions you may have. Further resources and [address]  Please visit the [facility] website for more information. [employer] in [employer]. [place] residents with questions about COVID-19 can call the [address] at [employer]. Learning About Coronavirus [phone])  Coronavirus [phone]): Overview  What is coronavirus (COVID-19)? The coronavirus disease (COVID-19) is caused by a virus. It is an illness that was first found in [place], [place], in December 2019. It has since spread worldwide. The virus can cause fever, cough, and trouble breathing. In severe cases, it can cause pneumonia and make it hard to breathe without help. It can cause death. Coronaviruses are a large group of viruses. They cause the common cold. They also cause more serious illnesses like Middle East respiratory syndrome (MERS) and severe acute respiratory syndrome (SARS). COVID-19 is caused by a novel coronavirus. That means it's a new type that has not been seen in people before. This virus spreads person-to-person through droplets from coughing and sneezing. It can also spread when you are close to someone who is infected. And it can spread when you touch something that has the virus on it, such as a doorknob or a tabletop. What can you do to protect yourself from coronavirus (COVID-19)? The best way to protect yourself from getting sick is to:  · Avoid areas where there is an outbreak. · Avoid contact with people who may be infected. · Wash your hands often with soap or alcohol-based hand sanitizers.   · Avoid crowds and try to stay at least 6 feet away from other people. · Wash your hands often, especially after you cough or sneeze. Use soap and water, and scrub for at least 20 seconds. If soap and water aren't available, use an alcohol-based hand . · Avoid touching your mouth, nose, and eyes. What can you do to avoid spreading the virus to others? To help avoid spreading the virus to others:  · Cover your mouth with a tissue when you cough or sneeze. Then throw the tissue in the trash. · Use a disinfectant to clean things that you touch often. · Stay home if you are sick or have been exposed to the virus. Don't go to school, work, or public areas. And don't use public transportation. · If you are sick:  ? Leave your home only if you need to get medical care. But call the doctor's office first so they know you're coming. And wear a face mask, if you have one.  ? If you have a face mask, wear it whenever you're around other people. It can help stop the spread of the virus when you cough or sneeze. ? Clean and disinfect your home every day. Use household  and disinfectant wipes or sprays. Take special care to clean things that you grab with your hands. These include doorknobs, remote controls, phones, and handles on your refrigerator and microwave. And don't forget countertops, tabletops, bathrooms, and computer keyboards. When to call for help  Call 911 anytime you think you may need emergency care. For example, call if:  · You have severe trouble breathing. (You can't talk at all.)  · You have constant chest pain or pressure. · You are severely dizzy or lightheaded. · You are confused or can't think clearly. · Your face and lips have a blue color. · You pass out (lose consciousness) or are very hard to wake up. Call your doctor now if you develop symptoms such as:  · Shortness of breath. · Fever. · Cough. If you need to get care, call ahead to the doctor's office for instructions before you go.  Make sure you wear a face mask, if you have one, to prevent exposing other people to the virus. Where can you get the latest information? The following health organizations are tracking and studying this virus. Their websites contain the most up-to-date information. Wily Alberto also learn what to do if you think you may have been exposed to the virus. · U.S. Centers for Disease Control and Prevention (CDC): The CDC provides updated news about the disease and travel advice. The website also tells you how to prevent the spread of infection. www.cdc.gov  · World Health Organization [facility]): WHO offers information about the virus outbreaks. WHO also has travel advice. www.who.int  Current as of: April 1, 2020               Content Version: 12.4  © 2006-2020 Healthwise, Incorporated. Care instructions adapted under license by your healthcare professional. If you have questions about a medical condition or this instruction, always ask your healthcare professional. [address] any warranty or liability for your use of this information.

## 2020-04-28 NOTE — ED NOTES
Discharge instructions were given to the patient by Yany Houser RN. The patient left the Emergency Department ambulatory, alert and oriented and in no acute distress with 0 prescriptions. The patient was encouraged to call or return to the ED for worsening issues or problems and was encouraged to schedule a follow up appointment for continuing care. The patient verbalized understanding of discharge instructions and prescriptions, all questions were answered. The patient has no further concerns at this time.

## 2020-04-29 ENCOUNTER — PATIENT OUTREACH (OUTPATIENT)
Dept: CASE MANAGEMENT | Age: 25
End: 2020-04-29

## 2020-04-29 NOTE — PROGRESS NOTES
Patient contacted regarding COVID-19  risk. Care Transition Nurse/ Ambulatory Care Manager was not able to contact the patient by telephone to perform post discharge assessment.

## 2020-05-03 ENCOUNTER — APPOINTMENT (OUTPATIENT)
Dept: GENERAL RADIOLOGY | Age: 25
End: 2020-05-03
Attending: EMERGENCY MEDICINE
Payer: OTHER GOVERNMENT

## 2020-05-03 ENCOUNTER — HOSPITAL ENCOUNTER (EMERGENCY)
Age: 25
Discharge: HOME | End: 2020-05-03
Attending: EMERGENCY MEDICINE
Payer: OTHER GOVERNMENT

## 2020-05-03 VITALS
WEIGHT: 199 LBS | RESPIRATION RATE: 16 BRPM | HEART RATE: 75 BPM | BODY MASS INDEX: 36.62 KG/M2 | DIASTOLIC BLOOD PRESSURE: 62 MMHG | SYSTOLIC BLOOD PRESSURE: 123 MMHG | HEIGHT: 62 IN | OXYGEN SATURATION: 100 % | TEMPERATURE: 98.8 F

## 2020-05-03 DIAGNOSIS — R09.1 PLEURISY: ICD-10-CM

## 2020-05-03 DIAGNOSIS — R05.9 COUGH: Primary | ICD-10-CM

## 2020-05-03 PROCEDURE — 87635 SARS-COV-2 COVID-19 AMP PRB: CPT

## 2020-05-03 PROCEDURE — 99282 EMERGENCY DEPT VISIT SF MDM: CPT

## 2020-05-03 PROCEDURE — 71045 X-RAY EXAM CHEST 1 VIEW: CPT

## 2020-05-03 RX ORDER — BENZONATATE 200 MG/1
200 CAPSULE ORAL
Qty: 21 CAP | Refills: 0 | Status: SHIPPED | OUTPATIENT
Start: 2020-05-03 | End: 2020-05-10

## 2020-05-03 NOTE — ED NOTES
Emergency Department Nursing Plan of Care       The Nursing Plan of Care is developed from the Nursing assessment and Emergency Department Attending provider initial evaluation. The plan of care may be reviewed in the ED Provider note. The Plan of Care was developed with the following considerations:   Patient / Family readiness to learn indicated by:verbalized understanding  Persons(s) to be included in education: patient  Barriers to Learning/Limitations: No     Signed     Jonn Copeland    5/3/2020   8:25 AM    Patient is alert and oriented x 4 and in no acute distress at this time. Respirations are at a regular rate, depth, and pattern. Patient updated on plan of care and has no questions or concerns at this time. Call bell within reach. Will continue to monitor. Please reference nursing assessment.

## 2020-05-03 NOTE — LETTER
[facility] EMERGENCY DEPT 
[address] [facility] [address] 
[phone] Work/School Note Date: 5/3/2020 To Whom It May concern: 
 
Brent Arias was seen and treated today in the emergency room by the following provider(s): 
Attending Provider: Zeferino Rosa MD.   
 
Brent Arias patient may return to work after test results for COVID-19 have return and are negative. Please excuse from work until May 7, 2020 Sincerely, Nick Xavier MD

## 2020-05-03 NOTE — ED NOTES
Discharge instructions were given to the patient by TRENTON Valdes RN. .     The patient left the Emergency Department ambulatory, alert and oriented and in no acute distress with 1 prescription(s). The patient was encouraged to call or return to the ED for worsening symptoms or problems and was encouraged to schedule a follow up appointment for continuing care. Patient leaving ED accompanied by self. The patient verbalized understanding of discharge instructions and prescriptions, all questions were answered. The patient has no further concerns at this time. Patient declined wheelchair transfer upon ED discharge.

## 2020-05-03 NOTE — ED PROVIDER NOTES
EMERGENCY DEPARTMENT HISTORY AND PHYSICAL EXAM      Date: 5/3/2020  Patient Name: Ezequiel Cazares    History of Presenting Illness     Chief Complaint   Patient presents with    Cough       History Provided By: Patient    HPI: Ezequiel Cazares, 22 y.o. female presents to the ED with cc of. Patient states she has had intermittent minute cough for 3 days. The patient also states occasionally she has some sharp pain when she coughs but does not have persistent pain on deep breaths. She has been exposed to her father who was diagnosed with the COVID-19 virus in early April and was quarantine until the end of April. The patient states she did not have exposure during that timeframe however. She denies any associated fever, chills, body aches, taste dysfunction, nausea, vomiting. She has no chronic illness including COPD or heart disease. She denies any illicit drug use or excessive alcohol use. Cough is nonproductive. There are no other complaints, changes, or physical findings at this time. PCP: None    No current facility-administered medications on file prior to encounter. No current outpatient medications on file prior to encounter. Past History     Past Medical History:  History reviewed. No pertinent past medical history. Past Surgical History:  History reviewed. No pertinent surgical history. Family History:  History reviewed. No pertinent family history. Social History:  Social History     Tobacco Use    Smoking status: Current Some Day Smoker     Packs/day: 0.25    Smokeless tobacco: Never Used   Substance Use Topics    Alcohol use: Yes     Comment: occassion.  Drug use: No       Allergies:  No Known Allergies      Review of Systems   Review of Systems   Constitutional: Negative. Negative for chills and fever. HENT: Negative. Negative for congestion and rhinorrhea. Respiratory: Positive for cough. Negative for chest tightness and wheezing. Cardiovascular: Negative. Negative for chest pain and palpitations. Gastrointestinal: Negative. Negative for abdominal pain, constipation, nausea and vomiting. Endocrine: Negative. Genitourinary: Negative. Negative for decreased urine volume, flank pain, hematuria and pelvic pain. Musculoskeletal: Negative. Negative for back pain and neck pain. Skin: Negative. Negative for color change, pallor and rash. Neurological: Negative. Negative for dizziness, seizures, weakness, numbness and headaches. Hematological: Negative. Negative for adenopathy. Psychiatric/Behavioral: Negative. All other systems reviewed and are negative. Physical Exam   Physical Exam  Vitals signs reviewed. Constitutional:       General: She is not in acute distress. Appearance: She is well-developed. She is not diaphoretic. HENT:      Head: Normocephalic and atraumatic. Mouth/Throat:      Pharynx: No oropharyngeal exudate. Eyes:      General: No scleral icterus. Right eye: No discharge. Left eye: No discharge. Conjunctiva/sclera: Conjunctivae normal.      Pupils: Pupils are equal, round, and reactive to light. Neck:      Musculoskeletal: Normal range of motion and neck supple. Vascular: No JVD. Cardiovascular:      Rate and Rhythm: Normal rate and regular rhythm. Heart sounds: Normal heart sounds. No murmur. No friction rub. No gallop. Pulmonary:      Effort: Pulmonary effort is normal. No respiratory distress. Breath sounds: Normal breath sounds. No stridor. No wheezing or rales. Chest:      Chest wall: No tenderness. Abdominal:      General: Bowel sounds are normal. There is no distension. Palpations: Abdomen is soft. There is no mass. Tenderness: There is no abdominal tenderness. There is no guarding or rebound. Skin:     General: Skin is warm. Coloration: Skin is not pale. Findings: No rash.    Neurological:      Mental Status: She is alert and oriented to person, place, and time. Cranial Nerves: No cranial nerve deficit. Motor: No abnormal muscle tone. Coordination: Coordination normal.      Deep Tendon Reflexes: Reflexes normal.       8:58 AM patient has a moderate risk of exposure to COVID. Plan will be to obtain chest x-ray to rule out any pulmonary infiltrates. We will test the patient with the SARS-CoV-2. The patient will be instructed that she will need to be quarantined until the result comes back. 9:52 AM josh results of the chest x-ray with the patient. She otherwise is not systemically ill appearing. She has no other signs to suggest PE at this time as she is not tachycardic or hypoxic. She has no persistent pleurisy other than occasional pain when she coughs. Have instructed about signs of PE and reasons to return. She will have the COVID-19 testing and will self quarantine until those results return. Diagnostic Study Results     Labs -   No results found for this or any previous visit (from the past 12 hour(s)). Radiologic Studies -   XR CHEST PORT   Final Result   Impression: No acute process. CT Results  (Last 48 hours)    None        CXR Results  (Last 48 hours)               05/03/20 0903  XR CHEST PORT Final result    Impression:  Impression: No acute process. Narrative: Indication: Cough, Covid exposure       Comparison: None       Portable exam of the chest obtained at 840 demonstrates normal heart size. There   is no acute process in the lung fields. The osseous structures are unremarkable. Medical Decision Making   I am the first provider for this patient. I reviewed the vital signs, available nursing notes, past medical history, past surgical history, family history and social history. Vital Signs-Reviewed the patient's vital signs.   Patient Vitals for the past 12 hrs:   Temp Pulse Resp BP SpO2   05/03/20 0816 98.8 °F (37.1 °C) 75 16 123/62 100 %           Records Reviewed: Nursing Notes and Old Medical Records    Provider Notes (Medical Decision Making):   Differential diagnosis-pneumonia, viral syndrome, COVID-19, influenza, bronchitis, CHF, PE    Impression/plan-22year-old female otherwise healthy to the ER with persistent cough for 2 weeks. The cough is not productive. She possibly had exposure to COVID-19. In the ER she is not sickly or ill-appearing. Plan will be to obtain chest x-ray to rule out pneumonia. We will test for COVID-19 and have instructed patient will need to self quarantine until those results are finalized. ED Course:   Initial assessment performed. The patients presenting problems have been discussed, and they are in agreement with the care plan formulated and outlined with them. I have encouraged them to ask questions as they arise throughout their visit. Kelby Higgins MD      Disposition:  Home    DISCHARGE PLAN:  1. Current Discharge Medication List      START taking these medications    Details   benzonatate (TESSALON) 200 mg capsule Take 1 Cap by mouth three (3) times daily as needed for Cough for up to 7 days. Qty: 21 Cap, Refills: 0           2. Follow-up Information     Follow up With Specialties Details Why [address] [facility]  Schedule an appointment as soon as possible for a visit in 1 week  [facility] [id number] [facility] EMERGENCY DEPT Emergency Medicine  If symptoms worsen [facility]  [phone]        3. Return to ED if worse     Diagnosis     Clinical Impression:   1. Cough    2. Pleurisy        Attestations:    Kelby Higgins MD    Please note that this dictation was completed with "[employer]", the computer voice recognition software. Quite often unanticipated grammatical, syntax, homophones, and other interpretive errors are inadvertently transcribed by the computer software.   Please disregard these errors. Please excuse any errors that have escaped final proofreading. Thank you.

## 2020-05-04 ENCOUNTER — PATIENT OUTREACH (OUTPATIENT)
Dept: INTERNAL MEDICINE CLINIC | Age: 25
End: 2020-05-04

## 2020-05-04 LAB
SARS-COV-2, COV2NT: NOT DETECTED
SPECIMEN SOURCE, FCOV2M: NORMAL

## 2020-05-04 NOTE — PROGRESS NOTES
Patient contacted regarding COVID-19 exposure. [facility] 5/3/20 - dx cough, pleurisy  Recent Travel Screening and Travel History documentation     Travel Screening       Question Response     In the last month, have you been in contact with someone who was confirmed or suspected to have Coronavirus / COVID-19? Yes     Do you have any of the following symptoms? Cough     Have you traveled internationally in the last month? No      Travel History   Travel since 20     No documented travel since 20        Pt stated she is feeling a little better. She is taking the tessalon. Pt's covid 9 test is pending. [facility]. Pt agreed to Loop. Care Transition Nurse/ Ambulatory Care Manager contacted the patient by telephone to perform post discharge assessment. Verified name and  with patient as identifiers. Provided introduction to self, and explanation of the CTN/ACM role, and reason for call due to risk factors for infection and/or exposure to COVID-19. Symptoms reviewed with patient who verbalized the following symptoms: pain or aching joints, cough, chest pain, no new symptoms, no worsening symptoms and chest tightness, wet cough. Due to no new or worsening symptoms encounter was not routed to provider for escalation. Patient has following risk factors of: no known risk factors. CTN/ACM reviewed discharge instructions, medical action plan and red flags such as increased shortness of breath, increasing fever and signs of decompensation with patient who verbalized understanding. Discussed exposure protocols and quarantine with CDC Guidelines What to do if you are sick with coronavirus disease 2019.  Patient was given an opportunity for questions and concerns. The patient agrees to contact the [facility] exposure line [phone], local health department n/a and PCP office for questions related to their healthcare.  CTN/ACM provided contact information for future needs.    Reviewed and educated patient on any new and changed medications related to discharge diagnosis. Patient/family/caregiver given information for [employer] and agrees to enroll yes  Patient's preferred e-mail:  [email or web address]  Patient's preferred phone number: [phone]  Based on Loop alert triggers, patient will be contacted by nurse care manager for worsening symptoms. Pt will be further monitored by COVID Loop Team based on severity of symptoms and risk factors.

## 2020-06-24 ENCOUNTER — HOSPITAL ENCOUNTER (EMERGENCY)
Age: 25
Discharge: HOME | End: 2020-06-24
Attending: EMERGENCY MEDICINE
Payer: SUBSIDIZED

## 2020-06-24 VITALS
WEIGHT: 206 LBS | OXYGEN SATURATION: 98 % | DIASTOLIC BLOOD PRESSURE: 89 MMHG | BODY MASS INDEX: 37.91 KG/M2 | TEMPERATURE: 98.8 F | HEART RATE: 92 BPM | SYSTOLIC BLOOD PRESSURE: 122 MMHG | RESPIRATION RATE: 18 BRPM | HEIGHT: 62 IN

## 2020-06-24 DIAGNOSIS — R30.0 DYSURIA: Primary | ICD-10-CM

## 2020-06-24 LAB
APPEARANCE UR: CLEAR
BACTERIA URNS QL MICRO: NEGATIVE /HPF
BILIRUB UR QL: NEGATIVE
COLOR UR: ABNORMAL
EPITH CASTS URNS QL MICRO: ABNORMAL /LPF
GLUCOSE UR STRIP.AUTO-MCNC: NEGATIVE MG/DL
HCG UR QL: NEGATIVE
HGB UR QL STRIP: NEGATIVE
KETONES UR QL STRIP.AUTO: NEGATIVE MG/DL
LEUKOCYTE ESTERASE UR QL STRIP.AUTO: NEGATIVE
NITRITE UR QL STRIP.AUTO: NEGATIVE
PH UR STRIP: 5.5 [PH] (ref 5–8)
PROT UR STRIP-MCNC: NEGATIVE MG/DL
RBC #/AREA URNS HPF: ABNORMAL /HPF (ref 0–5)
SP GR UR REFRACTOMETRY: 1.02 (ref 1–1.03)
UA: UC IF INDICATED,UAUC: ABNORMAL
UROBILINOGEN UR QL STRIP.AUTO: 1 EU/DL (ref 0.2–1)
WBC URNS QL MICRO: ABNORMAL /HPF (ref 0–4)

## 2020-06-24 PROCEDURE — 99282 EMERGENCY DEPT VISIT SF MDM: CPT

## 2020-06-24 PROCEDURE — 81025 URINE PREGNANCY TEST: CPT

## 2020-06-24 PROCEDURE — 81001 URINALYSIS AUTO W/SCOPE: CPT

## 2020-06-24 RX ORDER — PHENAZOPYRIDINE HYDROCHLORIDE 200 MG/1
200 TABLET, FILM COATED ORAL 3 TIMES DAILY
Qty: 6 TAB | Refills: 0 | Status: SHIPPED | OUTPATIENT
Start: 2020-06-24 | End: 2020-06-26

## 2020-06-24 NOTE — DISCHARGE INSTRUCTIONS
Patient Education        Painful Urination (Dysuria): Care Instructions  Your Care Instructions  Burning pain with urination (dysuria) is a common symptom of a urinary tract infection or other urinary problems. The bladder may become inflamed. This can cause pain when the bladder fills and empties. You may also feel pain if the tube that carries urine from the bladder to the outside of the body (urethra) gets irritated or infected. Sexually transmitted infections (STIs) also may cause pain when you urinate. Sometimes the pain can be caused by things other than an infection. The urethra can be irritated by soaps, perfumes, or foreign objects in the urethra. Kidney stones can cause pain when they pass through the urethra. The cause may be hard to find. You may need tests. Treatment for painful urination depends on the cause. Follow-up care is a key part of your treatment and safety. Be sure to make and go to all appointments, and call your doctor if you are having problems. It's also a good idea to know your test results and keep a list of the medicines you take. How can you care for yourself at home? · Drink extra water for the next day or two. This will help make the urine less concentrated. (If you have kidney, heart, or liver disease and have to limit fluids, talk with your doctor before you increase the amount of fluids you drink.)  · Avoid drinks that are carbonated or have caffeine. They can irritate the bladder. · Urinate often. Try to empty your bladder each time. For women:  · Urinate right after you have sex. · After going to the bathroom, wipe from front to back. · Avoid douches, bubble baths, and feminine hygiene sprays. And avoid other feminine hygiene products that have deodorants. When should you call for help? Call your doctor now or seek immediate medical care if:  · You have new symptoms, such as fever, nausea, or vomiting. · You have new or worse symptoms of a urinary problem.  For example:  ? You have blood or pus in your urine. ? You have chills or body aches. ? It hurts worse to urinate. ? You have groin or belly pain. ? You have pain in your back just below your rib cage (the flank area). Watch closely for changes in your health, and be sure to contact your doctor if you have any problems. Where can you learn more? Go to http://kyle-nohemi.info/  Enter H814 in the search box to learn more about \"Painful Urination (Dysuria): Care Instructions. \"  Current as of: August 22, 2019               Content Version: 12.5  © 1070-6371 [employer]. Care instructions adapted under license by [employer] (which disclaims liability or warranty for this information). If you have questions about a medical condition or this instruction, always ask your healthcare professional. [address] any warranty or liability for your use of this information.

## 2020-06-24 NOTE — ED PROVIDER NOTES
EMERGENCY DEPARTMENT HISTORY AND PHYSICAL EXAM      Date: 6/24/2020  Patient Name: Caretha Koyanagi    History of Presenting Illness     Chief Complaint   Patient presents with    Urinary Frequency     History Provided By: Patient    HPI: Caretha Koyanagi, 22 y.o. female with tobacco abuse who presents via private vehicle to the ED with cc of acute mild intermittent dysuria, urinary urgency, frequency X 3 days. No meds or modifying factors. Endorse concern for UTI. Denies new partners or concern for STI. Denies fever, chills, nausea, vomiting, abdominal pain, chest pain, shortness of breath, hematuria, constipation, diarrhea, vaginal discharge, vaginal bleeding. PCP: None    There are no other complaints, changes, or physical findings at this time. No current facility-administered medications on file prior to encounter. No current outpatient medications on file prior to encounter. Past History     Past Medical History:  History reviewed. No pertinent past medical history. Past Surgical History:  History reviewed. No pertinent surgical history. Family History:  History reviewed. No pertinent family history. Social History:  Social History     Tobacco Use    Smoking status: Current Some Day Smoker     Packs/day: 0.25    Smokeless tobacco: Never Used   Substance Use Topics    Alcohol use: Yes     Comment: occassion.  Drug use: No     Allergies:  No Known Allergies  Review of Systems   Review of Systems   Constitutional: Negative for activity change, chills, fatigue and fever. HENT: Negative. Respiratory: Negative. Negative for cough and shortness of breath. Cardiovascular: Negative. Negative for chest pain. Gastrointestinal: Negative for abdominal pain, constipation, diarrhea, nausea and vomiting. Genitourinary: Positive for dysuria, frequency and urgency. Negative for difficulty urinating, flank pain, hematuria, pelvic pain, vaginal bleeding, vaginal discharge and vaginal pain. Musculoskeletal: Negative. Negative for back pain. Skin: Negative. Negative for rash. Neurological: Negative. Negative for headaches. Psychiatric/Behavioral: Negative. Physical Exam   Physical Exam  Vitals signs and nursing note reviewed. Constitutional:       General: She is not in acute distress. Appearance: She is well-developed. She is obese. She is not diaphoretic. HENT:      Head: Normocephalic and atraumatic. Right Ear: Hearing and external ear normal.      Left Ear: Hearing and external ear normal.      Nose: Nose normal.   Eyes:      Conjunctiva/sclera: Conjunctivae normal.      Pupils: Pupils are equal, round, and reactive to light. Neck:      Musculoskeletal: Normal range of motion. Pulmonary:      Effort: Pulmonary effort is normal. No respiratory distress. Abdominal:      General: Bowel sounds are normal. There is no distension. Palpations: Abdomen is soft. There is no mass. Tenderness: There is no abdominal tenderness. There is no guarding or rebound. Musculoskeletal: Normal range of motion. Skin:     General: Skin is warm and dry. Neurological:      Mental Status: She is alert and oriented to person, place, and time. Psychiatric:         Behavior: Behavior normal.         Thought Content:  Thought content normal.         Judgment: Judgment normal.       Diagnostic Study Results   Labs -     Recent Results (from the past 12 hour(s))   URINALYSIS W/ REFLEX CULTURE    Collection Time: 06/24/20  7:05 PM   Result Value Ref Range    Color YELLOW/STRAW      Appearance CLEAR CLEAR      Specific gravity 1.020 1.003 - 1.030      pH (UA) 5.5 5.0 - 8.0      Protein Negative NEG mg/dL    Glucose Negative NEG mg/dL    Ketone Negative NEG mg/dL    Bilirubin Negative NEG      Blood Negative NEG      Urobilinogen 1.0 0.2 - 1.0 EU/dL    Nitrites Negative NEG      Leukocyte Esterase Negative NEG      WBC 0-4 0 - 4 /hpf    RBC 0-5 0 - 5 /hpf    Epithelial cells MODERATE (A) FEW /lpf    Bacteria Negative NEG /hpf    UA:UC IF INDICATED CULTURE NOT INDICATED BY UA RESULT CNI     HCG URINE, QL. - POC    Collection Time: 06/24/20  7:05 PM   Result Value Ref Range    Pregnancy test,urine (POC) Negative NEG         Radiologic Studies -   No orders to display     No results found. Medical Decision Making   I am the first provider for this patient. I reviewed the vital signs, available nursing notes, past medical history, past surgical history, family history and social history. Vital Signs-Reviewed the patient's vital signs. Patient Vitals for the past 24 hrs:   Temp Pulse Resp BP SpO2   06/24/20 1852 98.8 °F (37.1 °C) 92 18 122/89 98 %     Pulse Oximetry Analysis - 98% on RA and normal    Records Reviewed: Nursing Notes, Old Medical Records, Previous Radiology Studies and Previous Laboratory Studies    Provider Notes (Medical Decision Making):   Patient was presents with dysuria. DDx: Acute cystitis, pyleonephritis, ureteral stone, STI. Will obtain UA. Discussed with the patient diagnosis and test results and all questioned fully answered. They understand the importance of staying well hydrated and using OTC pyridium as desired. She will PCP if any problems arise. ED Course:   Initial assessment performed. The patients presenting problems have been discussed, and they are in agreement with the care plan formulated and outlined with them. I have encouraged them to ask questions as they arise throughout their visit. Progress Note:   Updated pt on all returned results and findings. Discussed the importance of proper follow up as referred below along with return precautions. Pt in agreement with the care plan and expresses agreement with and understanding of all items discussed. Disposition:  7:24 PM  I have discussed with patient their diagnosis, treatment, and follow up plan.  The patient agrees to follow up as outlined in discharge paperwork and also to return to the ED with any worsening. Mar Borges PA-C      PLAN:  1. Discharge Medication List as of 6/24/2020  7:19 PM      START taking these medications    Details   phenazopyridine (Pyridium) 200 mg tablet Take 1 Tab by mouth three (3) times daily for 2 days. , Normal, Disp-6 Tab, R-0           2. Follow-up Information     Follow up With Specialties Details Why [address] [facility] [place]  Schedule an appointment as soon as possible for a visit in 1 week As needed, If symptoms worsen [address] [facility] [address] [address] [facility]  [phone]        Return to ED if worse     Diagnosis     Clinical Impression:   1. Dysuria            Please note that this dictation was completed with Dragon, computer voice recognition software. Quite often unanticipated grammatical, syntax, homophones, and other interpretive errors are inadvertently transcribed by the computer software. Please disregard these errors. Additionally, please excuse any errors that have escaped final proofreading.

## 2020-06-24 NOTE — ED NOTES
..Discharge summary and discharge medications reviewed with patient and appropriate educational materials and side effects teaching were provided. patient  Given 0 paper prescriptions and 1 electronic prescriptions sent to pt's listed pharmacy. Patient (s) verbalized understanding of the importance of discussing medications with his or her physician or clinic they will be following up with. No si/s of acute distress prior to discharge. Patient offered wheelchair from treatment area to hospital entrance, patient declined wheelchair. Patient was d/c by provider.

## 2020-06-24 NOTE — ED NOTES
Bedside and Verbal shift change report given to Dayna Patel. RN (oncoming nurse) by Bruna Gómez RN (offgoing nurse). Report included the following information SBAR.

## 2020-06-24 NOTE — ED NOTES
Pt presents to the ED c/o urinary frequency, urinary pain x 3 days. Pt denies vaginal discharge, abdominal pain, n/v/d. Pt denies any chance of STD. Pt is A&Ox4. Pt is well appearing. Pt has no abdominal tenderness, abdomen is soft. Pt is afebrile. Emergency Department Nursing Plan of Care       The Nursing Plan of Care is developed from the Nursing assessment and Emergency Department Attending provider initial evaluation. The plan of care may be reviewed in the ED Provider note.     The Plan of Care was developed with the following considerations:   Patient / Family readiness to learn indicated by:verbalized understanding  Persons(s) to be included in education: patient  Barriers to Learning/Limitations:No    Signed     Gualberto Beatty RN    6/24/2020   7:15 PM

## 2020-07-08 ENCOUNTER — HOSPITAL ENCOUNTER (EMERGENCY)
Age: 25
Discharge: HOME | End: 2020-07-08
Attending: EMERGENCY MEDICINE
Payer: SELF-PAY

## 2020-07-08 VITALS
OXYGEN SATURATION: 100 % | TEMPERATURE: 98.7 F | HEART RATE: 81 BPM | RESPIRATION RATE: 18 BRPM | WEIGHT: 211 LBS | DIASTOLIC BLOOD PRESSURE: 71 MMHG | HEIGHT: 62 IN | BODY MASS INDEX: 38.83 KG/M2 | SYSTOLIC BLOOD PRESSURE: 115 MMHG

## 2020-07-08 DIAGNOSIS — B00.9 HSV INFECTION: ICD-10-CM

## 2020-07-08 DIAGNOSIS — R59.0 LYMPHADENOPATHY OF HEAD AND NECK REGION: Primary | ICD-10-CM

## 2020-07-08 PROCEDURE — 99282 EMERGENCY DEPT VISIT SF MDM: CPT

## 2020-07-08 RX ORDER — IBUPROFEN 800 MG/1
800 TABLET ORAL
Qty: 20 TAB | Refills: 0 | Status: SHIPPED | OUTPATIENT
Start: 2020-07-08 | End: 2020-07-15

## 2020-07-08 RX ORDER — ACYCLOVIR 400 MG/1
400 TABLET ORAL 3 TIMES DAILY
Qty: 21 TAB | Refills: 0 | Status: SHIPPED | OUTPATIENT
Start: 2020-07-08 | End: 2020-07-15

## 2020-07-08 NOTE — DISCHARGE INSTRUCTIONS
Patient Education        Swollen Lymph Nodes: Care Instructions  Your Care Instructions     Lymph nodes are small, bean-shaped glands throughout the body. They help your body fight germs and infections. Lymph nodes often swell when there is a problem such as an injury, infection, or tumor. · The nodes in your neck, under your chin, or behind your ears may swell when you have a cold or sore throat. · An injury or infection in a leg or foot can make the nodes in your groin swell. · Sometimes medicine can make lymph nodes swell, but this is rare. Treatment depends on what caused your nodes to swell. Usually the nodes return to normal size without a problem. Follow-up care is a key part of your treatment and safety. Be sure to make and go to all appointments, and call your doctor if you are having problems. It's also a good idea to know your test results and keep a list of the medicines you take. How can you care for yourself at home? · Take your medicines exactly as prescribed. Call your doctor if you think you are having a problem with your medicine. · Avoid irritation. ? Do not squeeze or pick at the lump. ? Do not stick a needle in it. · Prevent infection. Do not squeeze, drain, or puncture a painful lump. Doing this can irritate or inflame the lump, push any existing infection deeper into the skin, or cause severe bleeding. · Get extra rest. Slow down just a little from your usual routine. · Drink plenty of fluids, enough so that your urine is light yellow or clear like water. If you have kidney, heart, or liver disease and have to limit fluids, talk with your doctor before you increase the amount of fluids you drink. · Take an over-the-counter pain medicine, such as acetaminophen (Tylenol), ibuprofen (Advil, Motrin), or naproxen (Aleve). Read and follow all instructions on the label. · Do not take two or more pain medicines at the same time unless the doctor told you to.  Many pain medicines have acetaminophen, which is Tylenol. Too much acetaminophen (Tylenol) can be harmful. When should you call for help? Call your doctor now or seek immediate medical care if:  · You have worse symptoms of infection, such as:  ? Increased pain, swelling, warmth, or redness. ? Red streaks leading from the area. ? Pus draining from the area. ? A fever. Watch closely for changes in your health, and be sure to contact your doctor if:  · Your lymph nodes do not get smaller or do not return to normal.  · You do not get better as expected. Where can you learn more? Go to http://kyle-nohemi.info/  Enter A919 in the search box to learn more about \"Swollen Lymph Nodes: Care Instructions. \"  Current as of: February 11, 2020               Content Version: 12.5  © 1115-9450 [employer]. Care instructions adapted under license by [employer] (which disclaims liability or warranty for this information). If you have questions about a medical condition or this instruction, always ask your healthcare professional. [place] [address] any warranty or liability for your use of this information.

## 2020-07-08 NOTE — ED PROVIDER NOTES
EMERGENCY DEPARTMENT HISTORY AND PHYSICAL EXAM      Date: 7/8/2020  Patient Name: Casey Rizvi    History of Presenting Illness     Chief Complaint   Patient presents with    Jaw Pain     lower left, beginning this morning, pt denies injury     History Provided By: Patient    HPI: Casey Rizvi, 22 y.o. female with intermittent tobacco abuse who presents via private vehicle to the ED with cc of acute mild aching left lower jaw pain and mild swelling X 1 day. Denies fever, chills, nausea, vomiting, dental pain, drainage, sore throat, congestion, cough, chest pain, shortness of breath, headache, lightheadedness, dizziness. No known injury or trauma. No medications or alleviating factors. Patient is additionally questing refill for her Acyclovir for HSV. Denies any rash at present. PCP: None    There are no other complaints, changes, or physical findings at this time. No current facility-administered medications on file prior to encounter. No current outpatient medications on file prior to encounter. Past History     Past Medical History:  History reviewed. No pertinent past medical history. Past Surgical History:  History reviewed. No pertinent surgical history. Family History:  History reviewed. No pertinent family history. Social History:  Social History     Tobacco Use    Smoking status: Current Some Day Smoker     Packs/day: 0.25    Smokeless tobacco: Never Used   Substance Use Topics    Alcohol use: Yes     Comment: occassion.  Drug use: Yes     Types: Marijuana     Allergies:  No Known Allergies  Review of Systems   Review of Systems   Constitutional: Negative for activity change, chills, fatigue and fever. HENT: Positive for facial swelling.  Negative for congestion, dental problem, drooling, ear discharge, ear pain, hearing loss, mouth sores, nosebleeds, postnasal drip, rhinorrhea, sinus pressure, sinus pain, sneezing, sore throat, tinnitus, trouble swallowing and voice change. Eyes: Negative for photophobia, pain, discharge and visual disturbance. Respiratory: Negative for cough, chest tightness, shortness of breath and stridor. Cardiovascular: Negative for chest pain. Gastrointestinal: Negative for abdominal pain, constipation, diarrhea, nausea and vomiting. Genitourinary: Negative. Musculoskeletal: Negative. Negative for myalgias and neck pain. Skin: Negative. Negative for rash. Neurological: Negative for dizziness, seizures, syncope, light-headedness, numbness and headaches. Hematological: Positive for adenopathy. Psychiatric/Behavioral: Negative. Negative for confusion. Physical Exam   Physical Exam  Vitals signs and nursing note reviewed. Constitutional:       General: She is not in acute distress. Appearance: She is well-developed. She is not diaphoretic. HENT:      Head: Normocephalic and atraumatic. Jaw: There is normal jaw occlusion. Tenderness (minimal left sided.) present. No trismus, swelling, pain on movement or malocclusion. Right Ear: Hearing, tympanic membrane, ear canal and external ear normal.      Left Ear: Hearing, tympanic membrane, ear canal and external ear normal.      Nose: Nose normal.      Right Sinus: No maxillary sinus tenderness or frontal sinus tenderness. Left Sinus: No maxillary sinus tenderness or frontal sinus tenderness. Mouth/Throat:      Mouth: Mucous membranes are moist. No angioedema. Dentition: Normal dentition. Does not have dentures. No dental tenderness, gingival swelling, dental caries, dental abscesses or gum lesions. Tongue: No lesions. Palate: No lesions. Pharynx: Oropharynx is clear. Uvula midline. Tonsils: No tonsillar exudate or tonsillar abscesses. Eyes:      Conjunctiva/sclera: Conjunctivae normal.      Pupils: Pupils are equal, round, and reactive to light. Neck:      Musculoskeletal: Normal range of motion.    Pulmonary:      Effort: Pulmonary effort is normal. No respiratory distress. Musculoskeletal: Normal range of motion. Lymphadenopathy:      Head:      Right side of head: No submental, submandibular, tonsillar, preauricular, posterior auricular or occipital adenopathy. Left side of head: Submandibular (minimal) and tonsillar (minimal) adenopathy present. No submental, preauricular, posterior auricular or occipital adenopathy. Cervical: No cervical adenopathy. Skin:     General: Skin is warm and dry. Neurological:      Mental Status: She is alert and oriented to person, place, and time. Psychiatric:         Behavior: Behavior normal.         Thought Content: Thought content normal.         Judgment: Judgment normal.       Diagnostic Study Results   Labs -   No results found for this or any previous visit (from the past 12 hour(s)). Radiologic Studies -   No orders to display     No results found. Medical Decision Making   I am the first provider for this patient. I reviewed the vital signs, available nursing notes, past medical history, past surgical history, family history and social history. Vital Signs-Reviewed the patient's vital signs. Patient Vitals for the past 24 hrs:   Temp Pulse Resp BP SpO2   07/08/20 1215 98.7 °F (37.1 °C) 81 18 115/71 100 %     Pulse Oximetry Analysis - 100% on RA and normal    Records Reviewed: Nursing Notes, Old Medical Records, Previous Radiology Studies and Previous Laboratory Studies    Provider Notes (Medical Decision Making):   79-year-old female presents with atraumatic left lower jaw pain X 1 day. Endorses she noticed mild swelling. Differential includes lymphadenopathy, dental abscess, parotiditis. No signs of infection or abnormality on exam.  Minimal tenderness to left shoulder region overlying lymph nodes. Plan to treat with anti-inflammatory medicines. Patient additionally requesting refill of a Cyclovir for HSV. Denies any rash at present.     ED Course: Initial assessment performed. The patients presenting problems have been discussed, and they are in agreement with the care plan formulated and outlined with them. I have encouraged them to ask questions as they arise throughout their visit. Progress Note:   Updated pt on all returned results and findings. Discussed the importance of proper follow up as referred below along with return precautions. Pt in agreement with the care plan and expresses agreement with and understanding of all items discussed. Disposition:  1:16 PM  I have discussed with patient their diagnosis, treatment, and follow up plan. The patient agrees to follow up as outlined in discharge paperwork and also to return to the ED with any worsening. Dedra Garcia PA-C      PLAN:  1. There are no discharge medications for this patient. 2.   Follow-up Information    None       Return to ED if worse     Diagnosis     Clinical Impression: No diagnosis found. Please note that this dictation was completed with Dragon, computer voice recognition software. Quite often unanticipated grammatical, syntax, homophones, and other interpretive errors are inadvertently transcribed by the computer software. Please disregard these errors. Additionally, please excuse any errors that have escaped final proofreading.

## 2020-07-08 NOTE — ED NOTES
Emergency Department Nursing Plan of Care       The Nursing Plan of Care is developed from the Nursing assessment and Emergency Department Attending provider initial evaluation. The plan of care may be reviewed in the ED Provider note.     The Plan of Care was developed with the following considerations:   Patient / Family readiness to learn indicated by:verbalized understanding  Persons(s) to be included in education: patient  Barriers to Learning/Limitations:No    Signed     Hayden Edward    7/8/2020   1:52 PM

## 2020-07-08 NOTE — ED TRIAGE NOTES
Pt presents to ED with c/o lower left jaw pain beginning at 0100 today. Pt denies injury. Pt denies taking pain medication. Pt denies pain radiating to other area.

## 2020-10-14 ENCOUNTER — HOSPITAL ENCOUNTER (EMERGENCY)
Age: 25
Discharge: HOME | End: 2020-10-14
Attending: EMERGENCY MEDICINE | Admitting: EMERGENCY MEDICINE
Payer: SUBSIDIZED

## 2020-10-14 ENCOUNTER — APPOINTMENT (OUTPATIENT)
Dept: CT IMAGING | Age: 25
End: 2020-10-14
Attending: PHYSICIAN ASSISTANT
Payer: SUBSIDIZED

## 2020-10-14 VITALS
BODY MASS INDEX: 38.83 KG/M2 | HEIGHT: 62 IN | TEMPERATURE: 98.4 F | DIASTOLIC BLOOD PRESSURE: 85 MMHG | HEART RATE: 87 BPM | WEIGHT: 211 LBS | OXYGEN SATURATION: 100 % | RESPIRATION RATE: 18 BRPM | SYSTOLIC BLOOD PRESSURE: 125 MMHG

## 2020-10-14 DIAGNOSIS — G44.209 ACUTE NON INTRACTABLE TENSION-TYPE HEADACHE: Primary | ICD-10-CM

## 2020-10-14 PROCEDURE — 70450 CT HEAD/BRAIN W/O DYE: CPT

## 2020-10-14 PROCEDURE — 99283 EMERGENCY DEPT VISIT LOW MDM: CPT

## 2020-10-14 PROCEDURE — 74011250637 HC RX REV CODE- 250/637: Performed by: PHYSICIAN ASSISTANT

## 2020-10-14 RX ORDER — BUTALBITAL, ACETAMINOPHEN AND CAFFEINE 50; 325; 40 MG/1; MG/1; MG/1
1 TABLET ORAL
Qty: 12 TAB | Refills: 0 | Status: SHIPPED | OUTPATIENT
Start: 2020-10-14

## 2020-10-14 RX ORDER — BUTALBITAL, ACETAMINOPHEN AND CAFFEINE 50; 325; 40 MG/1; MG/1; MG/1
1 TABLET ORAL
Status: COMPLETED | OUTPATIENT
Start: 2020-10-14 | End: 2020-10-14

## 2020-10-14 RX ADMIN — BUTALBITAL, ACETAMINOPHEN, AND CAFFEINE 1 TABLET: 50; 325; 40 TABLET, COATED ORAL at 20:18

## 2020-10-14 NOTE — ED TRIAGE NOTES
C/o intermittent headache 1 week that worsened over the last 2 days. Pt reports taking extra strength tylenol without relief.

## 2020-10-15 NOTE — ED NOTES
Discharge instructions were given to the patient by Hao Cuello. The patient left the Emergency Department ambulatory, alert and oriented and in no acute distress with 1 prescription. The patient was encouraged to call or return to the ED for worsening issues or problems and was encouraged to schedule a follow up appointment for continuing care. The patient verbalized understanding of discharge instructions and prescriptions, all questions were answered. The patient has no further concerns at this time.

## 2020-10-15 NOTE — ED NOTES
Pt presents to ED ambulatory complaining of headache x1 wk not relieved by otc tylenol. Pt denies blurred vision, dizziness, fever, chills, chest pain, sob. Pt is alert and oriented x 4, RR even and unlabored, skin is warm and dry. Assessment completed and pt updated on plan of care. Call bell in reach. Emergency Department Nursing Plan of Care       The Nursing Plan of Care is developed from the Nursing assessment and Emergency Department Attending provider initial evaluation. The plan of care may be reviewed in the ED Provider note.     The Plan of Care was developed with the following considerations:   Patient / Family readiness to learn indicated by:verbalized understanding  Persons(s) to be included in education: patient  Barriers to Learning/Limitations:No    Signed     Tower Block    10/14/2020   8:08 PM

## 2020-10-15 NOTE — ED PROVIDER NOTES
EMERGENCY DEPARTMENT HISTORY AND PHYSICAL EXAM    Date: 10/14/2020  Patient Name: Mayo Varghese    History of Presenting Illness     Chief Complaint   Patient presents with    Headache         History Provided By: Patient    HPI: Mayo Varghese is a 22 y.o. female with No significant past medical history who presents with frontal headache x1 week that worsened over the last 2 days. Patient states initially she thought it was related to a sinus infection but denies any sinus pressure, nasal congestion, chills, cough. Patient states initially she did not have to take any medications but for the last 2 days she has had to take something to help relieve headache. Patient denies any nausea, vomiting, photophobia, weakness or blurry vision. There are no alleviating or exacerbating factors reported. PCP: None    Current Outpatient Medications   Medication Sig Dispense Refill    butalbital-acetaminophen-caffeine (FIORICET, ESGIC) -40 mg per tablet Take 1 Tab by mouth every six (6) hours as needed for Headache. 12 Tab 0       Past History     Past Medical History:  History reviewed. No pertinent past medical history. Past Surgical History:  History reviewed. No pertinent surgical history. Family History:  History reviewed. No pertinent family history. Social History:  Social History     Tobacco Use    Smoking status: Current Some Day Smoker     Packs/day: 0.25    Smokeless tobacco: Never Used   Substance Use Topics    Alcohol use: Yes     Comment: occassion.  Drug use: Yes     Types: Marijuana       Allergies:  No Known Allergies      Review of Systems   Review of Systems   Constitutional: Negative for chills and fever. HENT: Negative for congestion and sinus pressure. Eyes: Negative for photophobia and visual disturbance. Respiratory: Negative for cough. Gastrointestinal: Negative for nausea and vomiting. Allergic/Immunologic: Negative for immunocompromised state.    Neurological: Positive for headaches. Negative for dizziness, speech difficulty, light-headedness and numbness. All other systems reviewed and are negative. Physical Exam     Vitals:    10/14/20 1953   BP: 125/85   Pulse: 87   Resp: 18   Temp: 98.4 °F (36.9 °C)   SpO2: 100%   Weight: 95.7 kg (211 lb)   Height: 5' 2\" (1.575 m)     Physical Exam  Vitals signs and nursing note reviewed. Constitutional:       General: She is not in acute distress. Appearance: She is well-developed. HENT:      Head: Normocephalic and atraumatic. Right Ear: Tympanic membrane normal.      Left Ear: Tympanic membrane normal.   Eyes:      General: Lids are normal.      Extraocular Movements: Extraocular movements intact. Conjunctiva/sclera: Conjunctivae normal.   Cardiovascular:      Rate and Rhythm: Normal rate and regular rhythm. Heart sounds: Normal heart sounds. Pulmonary:      Effort: Pulmonary effort is normal. No respiratory distress. Breath sounds: Normal breath sounds. No wheezing or rales. Skin:     General: Skin is warm and dry. Neurological:      Mental Status: She is alert and oriented to person, place, and time. Psychiatric:         Behavior: Behavior normal.         Thought Content: Thought content normal.         Judgment: Judgment normal.           Diagnostic Study Results     Labs -   No results found for this or any previous visit (from the past 12 hour(s)). Radiologic Studies -   CT HEAD WO CONT   Final Result   IMPRESSION:    No acute abnormality. CT Results  (Last 48 hours)               10/14/20 2040  CT HEAD WO CONT Final result    Impression:  IMPRESSION:    No acute abnormality. Narrative:  EXAM: CT HEAD WO CONT       INDICATION: HA x 1wk, worse past 2 days       COMPARISON: None. CONTRAST: None. TECHNIQUE: Unenhanced CT of the head was performed using 5 mm images. Brain and   bone windows were generated. Coronal and sagittal reformats.  CT dose reduction   was achieved through use of a standardized protocol tailored for this   examination and automatic exposure control for dose modulation. FINDINGS:   The ventricles and sulci are normal in size, shape and configuration. . There is   no significant white matter disease. There is no intracranial hemorrhage,   extra-axial collection, or mass effect. The basilar cisterns are open. No CT   evidence of acute infarct. The bone windows demonstrate no abnormalities. The visualized portions of the   paranasal sinuses and mastoid air cells are clear. CXR Results  (Last 48 hours)    None            Medical Decision Making   I am the first provider for this patient. I reviewed the vital signs, available nursing notes, past medical history, past surgical history, family history and social history. Vital Signs-Reviewed the patient's vital signs. Records Reviewed: Old Medical Records    Disposition:  Discharged    DISCHARGE NOTE:   9:09 PM      Care plan outlined and precautions discussed. Patient has no new complaints, changes, or physical findings. Results of CT were reviewed with the patient. All medications were reviewed with the patient; will d/c home. All of pt's questions and concerns were addressed. Patient was instructed and agrees to follow up with neurology prn, as well as to return to the ED upon further deterioration. Patient is ready to go home.     Follow-up Information     Follow up With Specialties Details Why Contact Info    Jyoti Pal MD Neurology   [facility] [address] [phone]      [facility]  Schedule an appointment as soon as possible for a visit in 1 week As needed [address] [place] [address] Drive  [phone]          Discharge Medication List as of 10/14/2020  9:02 PM      START taking these medications    Details butalbital-acetaminophen-caffeine (FIORICET, ESGIC) -40 mg per tablet Take 1 Tab by mouth every six (6) hours as needed for Headache., Normal, Disp-12 Tab,R-0             Provider Notes (Medical Decision Making):   Patient presents with headache. DDx: migraine, cluster HA, tension HA, dehydration/lack of proper hydration, lack of proper sleep, stress. Less likely pseudotumor cerebri, SAH, ICH, cerebral dural venous thrombosis, or meningitis given the course, story and physical exam.  Analgesics ordered and CT at request of patient. Procedures:  Procedures    Please note that this dictation was completed with Dragon, computer voice recognition software. Quite often unanticipated grammatical, syntax, homophones, and other interpretive errors are inadvertently transcribed by the computer software. Please disregard these errors. Additionally, please excuse any errors that have escaped final proofreading. Diagnosis     Clinical Impression:   1.  Acute non intractable tension-type headache

## 2020-10-15 NOTE — DISCHARGE INSTRUCTIONS
Patient Education        Tension Headache: Care Instructions  Your Care Instructions  Most headaches are tension headaches. These headaches tend to happen again, especially if you are under stress. A tension headache may cause pain or a feeling of pressure all over your head. You probably can't pinpoint the center of the pain. If you keep getting tension headaches, the best thing you can do to limit them is to find out what is causing them and then make changes in those areas. Follow-up care is a key part of your treatment and safety. Be sure to make and go to all appointments, and call your doctor if you are having problems. It's also a good idea to know your test results and keep a list of the medicines you take. How can you care for yourself at home? · Rest in a quiet, dark room with a cool cloth on your forehead until your headache is gone. Close your eyes, and try to relax or go to sleep. Don't watch TV or read. Avoid using the computer. · Use a warm, moist towel or a heating pad set on low to relax tight shoulder and neck muscles. · Have someone gently massage your neck and shoulders. · Take pain medicines exactly as directed. ? If the doctor gave you a prescription medicine for pain, take it as prescribed. ? If you are not taking a prescription pain medicine, ask your doctor if you can take an over-the-counter medicine. · Be careful not to take pain medicine more often than the instructions allow, because you may get worse or more frequent headaches when the medicine wears off. · If you get another tension headache, stop what you are doing and sit quietly for a moment. Close your eyes and breathe slowly. Try to relax your head and neck muscles. · Do not ignore new symptoms that occur with a headache, such as fever, weakness or numbness, vision changes, or confusion. These may be signs of a more serious problem.   To help prevent headaches  · Keep a headache diary so you can figure out what triggers your headaches. Avoiding triggers may help you prevent headaches. Record when each headache began, how long it lasted, and what the pain was like (throbbing, aching, stabbing, or dull). List anything that may have triggered the headache, such as being physically or emotionally stressed or being anxious or depressed. Other possible triggers are hunger, anger, fatigue, poor posture, and muscle strain. · Find healthy ways to deal with stress. Headaches are most common during or right after stressful times. Take time to relax before and after you do something that has caused a headache in the past.  · Exercise daily to relieve stress. Relaxation exercises may help reduce tension. · Get plenty of sleep. · Eat regularly and well. Long periods without food can trigger a headache. · Treat yourself to a massage. Some people find that massages are very helpful in relieving tension. · Try to keep your muscles relaxed by keeping good posture. Check your jaw, face, neck, and shoulder muscles for tension, and try to relax them. When sitting at a desk, change positions often, and stretch for 30 seconds each hour. · Reduce eyestrain from computers by blinking frequently and looking away from the computer screen every so often. Make sure you have proper eyewear and that your monitor is set up properly, about an arm's length away. When should you call for help? Call 911 anytime you think you may need emergency care. For example, call if:    · You have signs of a stroke. These may include:  ? Sudden numbness, paralysis, or weakness in your face, arm, or leg, especially on only one side of your body. ? Sudden vision changes. ? Sudden trouble speaking. ? Sudden confusion or trouble understanding simple statements. ? Sudden problems with walking or balance. ? A sudden, severe headache that is different from past headaches.    Call your doctor now or seek immediate medical care if:    · You have new or worse nausea and vomiting.     · You have a new or higher fever.     · Your headache gets much worse. Watch closely for changes in your health, and be sure to contact your doctor if:    · You are not getting better after 2 days (48 hours). Where can you learn more? Go to http://www.gray.com/  Enter C544 in the search box to learn more about \"Tension Headache: Care Instructions. \"  Current as of: November 20, 2019               Content Version: 12.6  © 9710-7181 [employer]. Care instructions adapted under license by [employer] (which disclaims liability or warranty for this information). If you have questions about a medical condition or this instruction, always ask your healthcare professional. [address] any warranty or liability for your use of this information.

## 2021-05-03 ENCOUNTER — HOSPITAL ENCOUNTER (EMERGENCY)
Age: 26
Discharge: HOME | End: 2021-05-03
Attending: EMERGENCY MEDICINE
Payer: MEDICAID

## 2021-05-03 VITALS
SYSTOLIC BLOOD PRESSURE: 123 MMHG | TEMPERATURE: 98.6 F | DIASTOLIC BLOOD PRESSURE: 79 MMHG | HEART RATE: 94 BPM | OXYGEN SATURATION: 98 % | WEIGHT: 212 LBS | BODY MASS INDEX: 39.01 KG/M2 | HEIGHT: 62 IN | RESPIRATION RATE: 16 BRPM

## 2021-05-03 DIAGNOSIS — R10.11 ABDOMINAL PAIN, RIGHT UPPER QUADRANT: Primary | ICD-10-CM

## 2021-05-03 LAB
ALBUMIN SERPL-MCNC: 3.4 G/DL (ref 3.5–5)
ALBUMIN/GLOB SERPL: 0.9 {RATIO} (ref 1.1–2.2)
ALP SERPL-CCNC: 46 U/L (ref 45–117)
ALT SERPL-CCNC: 32 U/L (ref 12–78)
ANION GAP SERPL CALC-SCNC: 7 MMOL/L (ref 5–15)
APPEARANCE UR: ABNORMAL
AST SERPL-CCNC: 21 U/L (ref 15–37)
BACTERIA URNS QL MICRO: NEGATIVE /HPF
BASOPHILS # BLD: 0 K/UL (ref 0–0.1)
BASOPHILS NFR BLD: 0 % (ref 0–1)
BILIRUB SERPL-MCNC: 0.2 MG/DL (ref 0.2–1)
BILIRUB UR QL: NEGATIVE
BUN SERPL-MCNC: 10 MG/DL (ref 6–20)
BUN/CREAT SERPL: 11 (ref 12–20)
CALCIUM SERPL-MCNC: 9 MG/DL (ref 8.5–10.1)
CHLORIDE SERPL-SCNC: 105 MMOL/L (ref 97–108)
CO2 SERPL-SCNC: 30 MMOL/L (ref 21–32)
COLOR UR: ABNORMAL
CREAT SERPL-MCNC: 0.95 MG/DL (ref 0.55–1.02)
DIFFERENTIAL METHOD BLD: ABNORMAL
EOSINOPHIL # BLD: 0.1 K/UL (ref 0–0.4)
EOSINOPHIL NFR BLD: 1 % (ref 0–7)
EPITH CASTS URNS QL MICRO: ABNORMAL /LPF
ERYTHROCYTE [DISTWIDTH] IN BLOOD BY AUTOMATED COUNT: 12.1 % (ref 11.5–14.5)
GLOBULIN SER CALC-MCNC: 3.7 G/DL (ref 2–4)
GLUCOSE SERPL-MCNC: 125 MG/DL (ref 65–100)
GLUCOSE UR STRIP.AUTO-MCNC: NEGATIVE MG/DL
HCG UR QL: NEGATIVE
HCT VFR BLD AUTO: 37.7 % (ref 35–47)
HGB BLD-MCNC: 12.5 G/DL (ref 11.5–16)
HGB UR QL STRIP: NEGATIVE
IMM GRANULOCYTES # BLD AUTO: 0 K/UL (ref 0–0.04)
IMM GRANULOCYTES NFR BLD AUTO: 0 % (ref 0–0.5)
KETONES UR QL STRIP.AUTO: NEGATIVE MG/DL
LEUKOCYTE ESTERASE UR QL STRIP.AUTO: NEGATIVE
LIPASE SERPL-CCNC: 117 U/L (ref 73–393)
LYMPHOCYTES # BLD: 4.3 K/UL (ref 0.8–3.5)
LYMPHOCYTES NFR BLD: 45 % (ref 12–49)
MCH RBC QN AUTO: 31.2 PG (ref 26–34)
MCHC RBC AUTO-ENTMCNC: 33.2 G/DL (ref 30–36.5)
MCV RBC AUTO: 94 FL (ref 80–99)
MONOCYTES # BLD: 0.6 K/UL (ref 0–1)
MONOCYTES NFR BLD: 7 % (ref 5–13)
NEUTS SEG # BLD: 4.4 K/UL (ref 1.8–8)
NEUTS SEG NFR BLD: 47 % (ref 32–75)
NITRITE UR QL STRIP.AUTO: NEGATIVE
NRBC # BLD: 0 K/UL (ref 0–0.01)
NRBC BLD-RTO: 0 PER 100 WBC
PH UR STRIP: 6.5 [PH] (ref 5–8)
PLATELET # BLD AUTO: 403 K/UL (ref 150–400)
PMV BLD AUTO: 8.6 FL (ref 8.9–12.9)
POTASSIUM SERPL-SCNC: 4 MMOL/L (ref 3.5–5.1)
PROT SERPL-MCNC: 7.1 G/DL (ref 6.4–8.2)
PROT UR STRIP-MCNC: NEGATIVE MG/DL
RBC # BLD AUTO: 4.01 M/UL (ref 3.8–5.2)
RBC #/AREA URNS HPF: ABNORMAL /HPF (ref 0–5)
SODIUM SERPL-SCNC: 142 MMOL/L (ref 136–145)
SP GR UR REFRACTOMETRY: 1.01 (ref 1–1.03)
UA: UC IF INDICATED,UAUC: ABNORMAL
UROBILINOGEN UR QL STRIP.AUTO: 0.2 EU/DL (ref 0.2–1)
WBC # BLD AUTO: 9.4 K/UL (ref 3.6–11)
WBC URNS QL MICRO: ABNORMAL /HPF (ref 0–4)

## 2021-05-03 PROCEDURE — 85025 COMPLETE CBC W/AUTO DIFF WBC: CPT

## 2021-05-03 PROCEDURE — 36415 COLL VENOUS BLD VENIPUNCTURE: CPT

## 2021-05-03 PROCEDURE — 80053 COMPREHEN METABOLIC PANEL: CPT

## 2021-05-03 PROCEDURE — 81001 URINALYSIS AUTO W/SCOPE: CPT

## 2021-05-03 PROCEDURE — 99284 EMERGENCY DEPT VISIT MOD MDM: CPT

## 2021-05-03 PROCEDURE — 83690 ASSAY OF LIPASE: CPT

## 2021-05-03 PROCEDURE — 81025 URINE PREGNANCY TEST: CPT

## 2021-05-03 RX ORDER — ACYCLOVIR 400 MG/1
400 TABLET ORAL 2 TIMES DAILY
COMMUNITY
End: 2022-11-02 | Stop reason: SDUPTHER

## 2021-05-03 RX ORDER — DICYCLOMINE HYDROCHLORIDE 20 MG/1
20 TABLET ORAL
Qty: 15 TAB | Refills: 0 | Status: SHIPPED | OUTPATIENT
Start: 2021-05-03 | End: 2021-07-28 | Stop reason: ALTCHOICE

## 2021-05-04 NOTE — ED NOTES
31yo F reports to this ED with c/o abdominal cramping in her upper abdomen x3-4mo intermittently. Reports 1/10 pain on assessment. Reports pain as dull most of the time with an occasional sharp pain. Palpation or movement does not increase pain. Denies N/V/D. Denies fevers. Denies PMHx of GI issues. Denies urinary symptoms. Pt is alert, oriented, and appropriate at this time. Emergency Department Nursing Plan of Care       The Nursing Plan of Care is developed from the Nursing assessment and Emergency Department Attending provider initial evaluation. The plan of care may be reviewed in the ED Provider note.     The Plan of Care was developed with the following considerations:   Patient / Family readiness to learn indicated by:verbalized understanding  Persons(s) to be included in education: patient and family  Barriers to Learning/Limitations:No    Signed     Aurora Ratliff RN    5/3/2021   8:22 PM

## 2021-05-04 NOTE — ED PROVIDER NOTES
EMERGENCY DEPARTMENT HISTORY AND PHYSICAL EXAM    Date: 5/3/2021  Patient Name: Diego Calderon    History of Presenting Illness     Chief Complaint   Patient presents with    Abdominal Pain         History Provided By: Patient    HPI: Diego Calderon is a 32 y.o. female with no significant PMH who presents with right upper quadrant abdominal pain x3 to 4 months. Patient states pain has been intermittent and waxing and waning from time to time. Patient states he goes from sharp to dull without any specific exacerbation. Patient states at 1 time pain started prior to ED and noted time pain coincided with breathing. Patient denies any chest pain, shortness of breath, nausea, vomiting, fever, chills, dysuria, urinary frequency. Patient denies any past surgical history. Patient is a smoker. There are no alleviating or exacerbating factors reported. Patient does not currently have any pain at this time. PCP: None    Current Outpatient Medications   Medication Sig Dispense Refill    acyclovir (ZOVIRAX) 400 mg tablet Take 400 mg by mouth two (2) times a day.  dicyclomine (BENTYL) 20 mg tablet Take 1 Tab by mouth every six (6) hours as needed for Abdominal Cramps. 15 Tab 0    butalbital-acetaminophen-caffeine (FIORICET, ESGIC) -40 mg per tablet Take 1 Tab by mouth every six (6) hours as needed for Headache. 12 Tab 0       Past History     Past Medical History:  History reviewed. No pertinent past medical history. Past Surgical History:  History reviewed. No pertinent surgical history. Family History:  History reviewed. No pertinent family history.     Social History:  Social History     Tobacco Use    Smoking status: Current Some Day Smoker     Packs/day: 0.25    Smokeless tobacco: Never Used   Substance Use Topics    Alcohol use: Yes     Comment: occassionally    Drug use: Yes     Types: Marijuana       Allergies:  No Known Allergies      Review of Systems   Review of Systems   Constitutional: Negative for fever. Gastrointestinal: Positive for abdominal pain. Negative for diarrhea, nausea and vomiting. Genitourinary: Negative for dysuria, frequency and pelvic pain. Neurological: Negative for speech difficulty and weakness. All other systems reviewed and are negative. Physical Exam     Vitals:    05/03/21 1948   BP: 123/79   Pulse: 94   Resp: 16   Temp: 98.6 °F (37 °C)   SpO2: 98%   Weight: 96.2 kg (212 lb)   Height: 5' 2\" (1.575 m)     Physical Exam  Vitals signs and nursing note reviewed. Constitutional:       General: She is not in acute distress. Appearance: She is well-developed. HENT:      Head: Normocephalic and atraumatic. Eyes:      Conjunctiva/sclera: Conjunctivae normal.   Cardiovascular:      Rate and Rhythm: Normal rate and regular rhythm. Heart sounds: Normal heart sounds. Pulmonary:      Effort: Pulmonary effort is normal. No respiratory distress. Breath sounds: Normal breath sounds. No wheezing or rales. Abdominal:      General: Bowel sounds are normal.      Palpations: Abdomen is soft. Tenderness: There is no abdominal tenderness. There is no guarding or rebound. Negative signs include Sutton's sign and McBurney's sign. Skin:     General: Skin is warm and dry. Neurological:      Mental Status: She is alert and oriented to person, place, and time. Psychiatric:         Behavior: Behavior normal.         Thought Content:  Thought content normal.         Judgment: Judgment normal.           Diagnostic Study Results     Labs -     Recent Results (from the past 12 hour(s))   URINALYSIS W/ REFLEX CULTURE    Collection Time: 05/03/21  8:09 PM    Specimen: Urine   Result Value Ref Range    Color YELLOW/STRAW      Appearance CLOUDY (A) CLEAR      Specific gravity 1.010 1.003 - 1.030      pH (UA) 6.5 5.0 - 8.0      Protein Negative NEG mg/dL    Glucose Negative NEG mg/dL    Ketone Negative NEG mg/dL    Bilirubin Negative NEG      Blood Negative NEG Urobilinogen 0.2 0.2 - 1.0 EU/dL    Nitrites Negative NEG      Leukocyte Esterase Negative NEG      WBC 0-4 0 - 4 /hpf    RBC 0-5 0 - 5 /hpf    Epithelial cells FEW FEW /lpf    Bacteria Negative NEG /hpf    UA:UC IF INDICATED CULTURE NOT INDICATED BY UA RESULT CNI     HCG URINE, QL. - POC    Collection Time: 05/03/21  8:11 PM   Result Value Ref Range    Pregnancy test,urine (POC) Negative NEG     CBC WITH AUTOMATED DIFF    Collection Time: 05/03/21  8:32 PM   Result Value Ref Range    WBC 9.4 3.6 - 11.0 K/uL    RBC 4.01 3.80 - 5.20 M/uL    HGB 12.5 11.5 - 16.0 g/dL    HCT 37.7 35.0 - 47.0 %    MCV 94.0 80.0 - 99.0 FL    MCH 31.2 26.0 - 34.0 PG    MCHC 33.2 30.0 - 36.5 g/dL    RDW 12.1 11.5 - 14.5 %    PLATELET 632 (H) 518 - 400 K/uL    MPV 8.6 (L) 8.9 - 12.9 FL    NRBC 0.0 0  WBC    ABSOLUTE NRBC 0.00 0.00 - 0.01 K/uL    NEUTROPHILS 47 32 - 75 %    LYMPHOCYTES 45 12 - 49 %    MONOCYTES 7 5 - 13 %    EOSINOPHILS 1 0 - 7 %    BASOPHILS 0 0 - 1 %    IMMATURE GRANULOCYTES 0 0.0 - 0.5 %    ABS. NEUTROPHILS 4.4 1.8 - 8.0 K/UL    ABS. LYMPHOCYTES 4.3 (H) 0.8 - 3.5 K/UL    ABS. MONOCYTES 0.6 0.0 - 1.0 K/UL    ABS. EOSINOPHILS 0.1 0.0 - 0.4 K/UL    ABS. BASOPHILS 0.0 0.0 - 0.1 K/UL    ABS. IMM.  GRANS. 0.0 0.00 - 0.04 K/UL    DF AUTOMATED     LIPASE    Collection Time: 05/03/21  8:32 PM   Result Value Ref Range    Lipase 117 73 - 228 U/L   METABOLIC PANEL, COMPREHENSIVE    Collection Time: 05/03/21  8:32 PM   Result Value Ref Range    Sodium 142 136 - 145 mmol/L    Potassium 4.0 3.5 - 5.1 mmol/L    Chloride 105 97 - 108 mmol/L    CO2 30 21 - 32 mmol/L    Anion gap 7 5 - 15 mmol/L    Glucose 125 (H) 65 - 100 mg/dL    BUN 10 6 - 20 MG/DL    Creatinine 0.95 0.55 - 1.02 MG/DL    BUN/Creatinine ratio 11 (L) 12 - 20      GFR est AA >60 >60 ml/min/1.73m2    GFR est non-AA >60 >60 ml/min/1.73m2    Calcium 9.0 8.5 - 10.1 MG/DL    Bilirubin, total 0.2 0.2 - 1.0 MG/DL    ALT (SGPT) 32 12 - 78 U/L    AST (SGOT) 21 15 - 37 U/L Alk. phosphatase 46 45 - 117 U/L    Protein, total 7.1 6.4 - 8.2 g/dL    Albumin 3.4 (L) 3.5 - 5.0 g/dL    Globulin 3.7 2.0 - 4.0 g/dL    A-G Ratio 0.9 (L) 1.1 - 2.2         Radiologic Studies -   No orders to display     CT Results  (Last 48 hours)    None        CXR Results  (Last 48 hours)    None            Medical Decision Making   I am the first provider for this patient. I reviewed the vital signs, available nursing notes, past medical history, past surgical history, family history and social history. Vital Signs-Reviewed the patient's vital signs. Records Reviewed: Nursing Notes and Old Medical Records    Provider Notes (Medical Decision Making):   Patient presents with RUQ abdominal pain x 3-4 mo. DDx: Cholelithiasis, cholecystitis, GERD, PUD, pregnancy, muscle spasm, musculoskeletal pain. Will check basic labs and UA. Patient not having any pain on examination at this time so if labs are WNL will likely have patient follow-up with GI as no imaging is warranted at this time. ED Course as of May 03 2128   Mon May 03, 2021   2123 Went in to discuss labs with patient and advised that she should follow up with GI. Patient reports she may need outpatient ultrasound and or endoscopy since symptoms have been ongoing for several months. Patient in agreement with plan and will discharge home    [AH]      ED Course User Index  [AH] Sandip Ballard PA-C          Disposition:  Discharged    DISCHARGE NOTE:   9:27 PM      Care plan outlined and precautions discussed. Patient has no new complaints, changes, or physical findings. Results of labs were reviewed with the patient. All medications were reviewed with the patient; will d/c home. All of pt's questions and concerns were addressed. Patient was instructed and agrees to follow up with GI, as well as to return to the ED upon further deterioration. Patient is ready to go home.     Follow-up Information     Follow up With Specialties Details Why Joanne Sepulveda [address] [facility] Schedule an appointment as soon as possible for a visit   [place]  [phone]          Current Discharge Medication List      START taking these medications    Details   dicyclomine (BENTYL) 20 mg tablet Take 1 Tab by mouth every six (6) hours as needed for Abdominal Cramps. Qty: 15 Tab, Refills: 0             Procedures:  Procedures    Please note that this dictation was completed with Dragon, computer voice recognition software. Quite often unanticipated grammatical, syntax, homophones, and other interpretive errors are inadvertently transcribed by the computer software. Please disregard these errors. Additionally, please excuse any errors that have escaped final proofreading. Diagnosis     Clinical Impression:   1.  Abdominal pain, right upper quadrant

## 2021-05-17 ENCOUNTER — APPOINTMENT (OUTPATIENT)
Dept: GENERAL RADIOLOGY | Age: 26
End: 2021-05-17
Attending: EMERGENCY MEDICINE
Payer: MEDICAID

## 2021-05-17 ENCOUNTER — HOSPITAL ENCOUNTER (EMERGENCY)
Age: 26
Discharge: HOME | End: 2021-05-17
Attending: EMERGENCY MEDICINE
Payer: MEDICAID

## 2021-05-17 VITALS
RESPIRATION RATE: 18 BRPM | HEART RATE: 79 BPM | OXYGEN SATURATION: 99 % | DIASTOLIC BLOOD PRESSURE: 82 MMHG | WEIGHT: 218 LBS | BODY MASS INDEX: 37.22 KG/M2 | SYSTOLIC BLOOD PRESSURE: 128 MMHG | TEMPERATURE: 98.7 F | HEIGHT: 64 IN

## 2021-05-17 DIAGNOSIS — S42.92XA TRAUMATIC CLOSED DISPLACED FRACTURE OF LEFT SHOULDER WITH ANTERIOR DISLOCATION, INITIAL ENCOUNTER: Primary | ICD-10-CM

## 2021-05-17 LAB — HCG UR QL: NEGATIVE

## 2021-05-17 PROCEDURE — 81025 URINE PREGNANCY TEST: CPT

## 2021-05-17 PROCEDURE — 74011250636 HC RX REV CODE- 250/636: Performed by: EMERGENCY MEDICINE

## 2021-05-17 PROCEDURE — 96374 THER/PROPH/DIAG INJ IV PUSH: CPT

## 2021-05-17 PROCEDURE — 99285 EMERGENCY DEPT VISIT HI MDM: CPT

## 2021-05-17 PROCEDURE — 75810000303 HC CLSD TRMT  FRACTURE/DISLOCATION W/  ANES

## 2021-05-17 PROCEDURE — 73020 X-RAY EXAM OF SHOULDER: CPT

## 2021-05-17 PROCEDURE — 73030 X-RAY EXAM OF SHOULDER: CPT

## 2021-05-17 RX ORDER — PROPOFOL 10 MG/ML
200 INJECTION, EMULSION INTRAVENOUS
Status: DISCONTINUED | OUTPATIENT
Start: 2021-05-17 | End: 2021-05-17 | Stop reason: CLARIF

## 2021-05-17 RX ORDER — IBUPROFEN 800 MG/1
800 TABLET ORAL
Qty: 30 TAB | Refills: 0 | Status: SHIPPED
Start: 2021-05-17 | End: 2021-07-07

## 2021-05-17 RX ORDER — HYDROCODONE BITARTRATE AND ACETAMINOPHEN 5; 325 MG/1; MG/1
1 TABLET ORAL
Qty: 10 TAB | Refills: 0 | Status: SHIPPED | OUTPATIENT
Start: 2021-05-17 | End: 2021-05-20

## 2021-05-17 RX ORDER — HYDROCODONE BITARTRATE AND ACETAMINOPHEN 5; 325 MG/1; MG/1
1 TABLET ORAL
Status: DISCONTINUED | OUTPATIENT
Start: 2021-05-17 | End: 2021-05-17

## 2021-05-17 RX ORDER — PROPOFOL 10 MG/ML
200 INJECTION, EMULSION INTRAVENOUS
Status: COMPLETED | OUTPATIENT
Start: 2021-05-17 | End: 2021-05-17

## 2021-05-17 RX ORDER — IBUPROFEN 400 MG/1
800 TABLET ORAL
Status: DISCONTINUED | OUTPATIENT
Start: 2021-05-17 | End: 2021-05-17

## 2021-05-17 RX ORDER — MORPHINE SULFATE 2 MG/ML
2 INJECTION, SOLUTION INTRAMUSCULAR; INTRAVENOUS
Status: COMPLETED | OUTPATIENT
Start: 2021-05-17 | End: 2021-05-17

## 2021-05-17 RX ADMIN — MORPHINE SULFATE 2 MG: 2 INJECTION, SOLUTION INTRAMUSCULAR; INTRAVENOUS at 14:42

## 2021-05-17 RX ADMIN — PROPOFOL 160 MG: 10 INJECTION, EMULSION INTRAVENOUS at 15:00

## 2021-05-17 NOTE — ED NOTES
Procedure Note - Procedural Sedation:     Indication:  Procedural sedation is required to perform the following procedure:  L closed shoulder reduction    Informed Consent:  The reason for the procedure as well as the risks, benefits, and alternatives to the procedure have been explained to the patient and caregiver and She consents to the procedure. The consent has been signed by the patient/representative, the medical provider and witnessed by the patients nurse. Anesthesia Risks: The ASA score is ASA 1 - Normal, healthy patient. The patient is having all vital signs monitored by an RN as well as pulse oximetry. Mallampati Score Reference: The patient has a patent airway with a Mallampati Classification Score of II (soft palate, uvula, fauces visible). Pre-Procedure Sedation Assessment:  Sedation Start Time: 1449  Time Out was performed to confirm patient identity, laterality and indication prior to procedural sedation. Post Procedure Sedation Assessment:   Sedation End Time: 1502    The patient was sedated with a total of 160 mg propofol/DIPRIVAN via IV. Reversal agents and resuscitation equipment were at the bedside. The procedure was done and the patient tolerated the procedure well with no complications. Reversal agents were not used. Patient tolerated procedure well.   Complications: None     Ibis Hampton MD

## 2021-05-17 NOTE — ED NOTES
Patient placed on monitor and placed on 2 LPM oxygen via cannula. Suction prepared. Code car at bedside.

## 2021-05-17 NOTE — ED NOTES
Patient holding down sips of water prior to discharge. Patient (s)  given copy of dc instructions and 2 script(s). Patient (s)  verbalized understanding of instructions and script (s). Patient given a current medication reconciliation form and verbalized understanding of their medications. Patient (s) verbalized understanding of the importance of discussing medications with  his or her physician or clinic they will be following up with. Patient alert and oriented and in no acute distress. Patient discharged home ambulatory with family/friend.

## 2021-05-17 NOTE — ED TRIAGE NOTES
Pt was arguing with her boyfriend got mad and punched a wall. Pt c\o left shoulder pain.  Pt does not want to report incident feels safe at home

## 2021-05-17 NOTE — ED PROVIDER NOTES
EMERGENCY DEPARTMENT HISTORY AND PHYSICAL EXAM    Date: 5/17/2021  Patient Name: Arturo Del Rosario    History of Presenting Illness     Chief Complaint   Patient presents with    Shoulder Pain       History Provided By: Patient    HPI: Arturo Del Rosario is a 32 y.o. female with a PMH of No significant past medical history who presents with left shoulder pain and decreased range of motion for the past 2 to 3 hours after pushing her arm against a wall in frustration. She states it feels like her shoulder is dislocated. Patient has had similar symptoms to this in the past but states that it has normally resolved with some manipulation of the left shoulder. She denies any falls or trauma. She is right-hand dominant. She denies taking any medications for the symptoms prior to arrival and last ate last night at dinnertime. Her pain is located along the anterior aspect of the left shoulder and does not radiate. Trying to move the left shoulder makes the pain worse and nothing makes the pain better. PCP: None    Current Facility-Administered Medications   Medication Dose Route Frequency Provider Last Rate Last Admin    HYDROcodone-acetaminophen (NORCO) 5-325 mg per tablet 1 Tab  1 Tab Oral NOW Dottie Anders NP        ibuprofen (MOTRIN) tablet 800 mg  800 mg Oral NOW Dottie Anders NP         Current Outpatient Medications   Medication Sig Dispense Refill    HYDROcodone-acetaminophen (Norco) 5-325 mg per tablet Take 1 Tab by mouth every six (6) hours as needed for Pain for up to 3 days. Max Daily Amount: 4 Tabs. 10 Tab 0    ibuprofen (MOTRIN) 800 mg tablet Take 1 Tab by mouth every eight (8) hours as needed for Pain. 30 Tab 0    acyclovir (ZOVIRAX) 400 mg tablet Take 400 mg by mouth two (2) times a day.  dicyclomine (BENTYL) 20 mg tablet Take 1 Tab by mouth every six (6) hours as needed for Abdominal Cramps.  15 Tab 0    butalbital-acetaminophen-caffeine (FIORICET, ESGIC) -40 mg per tablet Take 1 Tab by mouth every six (6) hours as needed for Headache. 12 Tab 0       Past History     Past Medical History:  History reviewed. No pertinent past medical history. Past Surgical History:  History reviewed. No pertinent surgical history. Family History:  History reviewed. No pertinent family history. Social History:  Social History     Tobacco Use    Smoking status: Never Smoker    Smokeless tobacco: Never Used   Substance Use Topics    Alcohol use: Yes     Comment: occassionally    Drug use: Yes     Types: Marijuana       Allergies:  No Known Allergies      Review of Systems   Review of Systems   Constitutional: Negative for chills and fever. Respiratory: Negative for shortness of breath. Cardiovascular: Negative for chest pain. Musculoskeletal: Positive for arthralgias and joint swelling. Negative for gait problem, neck pain and neck stiffness. Skin: Negative for color change, pallor and wound. Neurological: Negative for weakness and numbness. All other systems reviewed and are negative. Physical Exam     Patient Vitals for the past 12 hrs:   Temp Pulse Resp BP SpO2   05/17/21 1515  75  130/86    05/17/21 1510  74   100 %   05/17/21 1505  77  133/78 100 %   05/17/21 1500  84  134/78 100 %   05/17/21 1455  97  125/79 100 %   05/17/21 1449  89 18 (!) 147/89 100 %   05/17/21 1218 98.7 °F (37.1 °C) 77 18 (!) 145/82 100 %     Physical Exam  Vitals signs and nursing note reviewed. Constitutional:       General: She is not in acute distress. Appearance: She is well-developed. She is not ill-appearing. Neck:      Musculoskeletal: Normal range of motion and neck supple. Cardiovascular:      Rate and Rhythm: Normal rate and regular rhythm. Pulses: Normal pulses. Heart sounds: Normal heart sounds. Pulmonary:      Effort: Pulmonary effort is normal.      Breath sounds: Normal breath sounds.    Musculoskeletal:      Right shoulder: Normal.      Left shoulder: She exhibits decreased range of motion, tenderness, swelling, deformity and spasm. She exhibits no effusion, no crepitus, normal pulse and normal strength. Left upper arm: Normal.   Skin:     General: Skin is warm and dry. Neurological:      Mental Status: She is alert and oriented to person, place, and time. Diagnostic Study Results     Labs -     Recent Results (from the past 12 hour(s))   HCG URINE, QL. - POC    Collection Time: 05/17/21  1:31 PM   Result Value Ref Range    Pregnancy test,urine (POC) Negative NEG         Radiologic Studies -   XR SHOULDER LT 1 V   Final Result   Transaxial view recommended. Glenohumeral joint appears relocated. XR SHOULDER LT AP/LAT MIN 2 V   Final Result   Anterior shoulder dislocation. CT Results  (Last 48 hours)    None        CXR Results  (Last 48 hours)    None          Medical Decision Making   I am the first provider for this patient. I reviewed the vital signs, available nursing notes, past medical history, past surgical history, family history and social history. Vital Signs-Reviewed the patient's vital signs. Records Reviewed: Nursing Notes    Provider Notes (Medical Decision Making):     ED Course as of May 17 1529   Mon May 17, 2021   1402 Xray remarkable for anterior shoulder dislocation. Plan to discussed with attending Dr Michele Ruiz for shoulder reduction     [NA]   [id number] [phone] Discussed shoulder results with patient. Plan to perform shoulder reduction on conscious sedation which was discussed with patient and she verbalized understanding. [NA]   2781 I performed the conscious sedation as the attending of record for this procedure. Please see my separate procedure note.     [MB]      ED Course User Index  [MB] Kyleigh Herron MD  [NA] Aida Waggoner NP      Procedure Note - Procedural Sedation:   Performed by: Dr. Prachi Rowe Patient Assessment:  2:42 PM  HR: 89      Rhythm: Normal sinus rhythm  RR: 18 SpO2:100% RA  Airway patent?:  Yes    Preprocedural Education:  2:42 PM  The reason for the procedure as well as the risks, benefits, and alternatives to the procedure have been explained to the patient and boyfriend and She consents to the procedure. The consent has been signed by the patient/representative, the medical provider and witnessed by the patients nurse. Anaesthesia Risks:  3:22 PM   The ASA score is ASA 1 - Normal, healthy patient. The patient is having all vital signs monitored by an attending RN as well as pulse oximetry. Mallampati Score Reference:  3:22 PM                                  The patient has a patent airway with a Mallampati Classification Score of III (soft palate, base of uvula visible). PLAN FOR SEDATION:  2:42 PM    The patients sedation plan includes a total of 100mg propofol/DIPRIVAN and 4mg morphine sulfate. Reversal agents and resuscitation equipment will be at the bedside should they be needed. The reason for the procedure as well as the risks, benefits, and alternatives to the sedation have been explained to the patient and She consents to the sedation. The consent has been signed by the patient/representative, the medical provider and witnessed by the patients nurse. Immediate Assessment Prior to Medication Administration  2:42 PM    Post Procedure Anaesthesia/Sedation Assessment  TIME: 3:14 PM  The patient was sedated with a total of 160mg propofol/DIPRIVAN and 4mg morphine sulfate. Reversal agents and resuscitation equipment were at the bedside. The left shoulder reduction was done and the patient tolerated the procedure well with no complications. Reversal agents were not used.     RR: 18                 SpO2:100% RA  Airway patent?:  Yes    HR: 89      Rhythm: Normal sinus rhythm  BP: 147/89    Mental Status: Awake and alert x3       DISCHARGE ASSESSMENT  TIME: 3:25 PM    I have reassessed the patient and determined that they have returned to their preprocedure neurologic and cardiologic baseline and are medically stable and appropriate for discharge. The patient has been observed in the ED until She returns to baseline and is able to tolerate clear liquids. Post-Discharge Instructions provided to the patient    Written post-discharge instructions are being provided to the patient and the responsible adult accompanying the patient. Information included was:  1. Relevant dietary and medication instructions;    2. Post-discharge activity instructions;   3. Requirement that a responsible adult accompany the patient home post-discharge;   4. Steps to follow in the event of a complication, including a phone number to call; and 5. What to do in event of an emergency. Procedure Note - Reduction:    Performed by Bee Lara MD .      Immediately prior to the procedure, the patient was reevaluated and found suitable for the planned procedure and any planned medications. Immediately prior to the procedure a time out was called to verify the correct patient, procedure, equipment, staff, and marking as appropriate. Prior to the procedure, neurovascular exam was intact. Analgesia was obtained with procedural sedation - see record. To achieve reduction of the patient's left shoulder joint, logitudinal traction and external rotation manipulation was utilized. The joint was successfully reduced. Neurovascular exam was intact following the procedure. The procedure was tolerated well. Disposition:  Discharge Note:  The pt is ready for discharge. The pt's signs, symptoms, diagnosis, and discharge instructions have been discussed and pt has conveyed their understanding. The pt is to follow up as recommended or return to ER should their symptoms worsen. Plan has been discussed and pt is in agreement. DISCHARGE NOTE:   Discharge Note:  The pt is ready for discharge.  The pt's signs, symptoms, diagnosis, and discharge instructions have been discussed and pt has conveyed their understanding. The pt is to follow up as recommended or return to ER should their symptoms worsen. Plan has been discussed and pt is in agreement. Follow-up Information     Follow up With Specialties Details Why Contact Kaya Montero, DO Orthopedic Surgery Schedule an appointment as soon as possible for a visit in 2 days  [address] [facility]  [address] [address]  [phone]      [facility] [address] [place] [address] DEPT Emergency Medicine  As needed [place]  [phone]          Current Discharge Medication List      START taking these medications    Details   HYDROcodone-acetaminophen (Norco) 5-325 mg per tablet Take 1 Tab by mouth every six (6) hours as needed for Pain for up to 3 days. Max Daily Amount: 4 Tabs. Qty: 10 Tab, Refills: 0  Start date: 5/17/2021, End date: 5/20/2021    Associated Diagnoses: Traumatic closed displaced fracture of left shoulder with anterior dislocation, initial encounter      ibuprofen (MOTRIN) 800 mg tablet Take 1 Tab by mouth every eight (8) hours as needed for Pain. Qty: 30 Tab, Refills: 0  Start date: 5/17/2021             Procedures:  Procedures    Please note that this dictation was completed with Dragon, computer voice recognition software. Quite often unanticipated grammatical, syntax, homophones, and other interpretive errors are inadvertently transcribed by the computer software. Please disregard these errors. Additionally, please excuse any errors that have escaped final proofreading. Diagnosis     Clinical Impression:   1.  Traumatic closed displaced fracture of left shoulder with anterior dislocation, initial encounter

## 2021-05-17 NOTE — LETTER
[facility] EMERGENCY DEPT 
[address] [facility] [address] [phone] Work/School Note Date: 5/17/2021 To Whom It May concern: 
 
Kassandra Pizarro was seen and treated today in the emergency room by the following provider(s): 
Attending Provider: Kvng Horton MD 
Nurse Practitioner: Ryan Mccann NP.   
 
Kassandra Pizarro may return to work on 05/18/2021 but will need to be on light duty until 05/24/2021.  
 
Sincerely, 
 
 
 
 
Franki Ramírez MD

## 2021-05-17 NOTE — LETTER
<SHARON DIAMOND - Last Filed: 06/12/18 11:11>





History of Present Illness





- Reason for Consult


Consult date: 06/12/18


GERD,dysphagia, clicking sound in throat


Requesting physician: SANYA ZAMORA





- History of Present Illness


Patient is a 59 y/o female with PMH of HTN, DM, high cholesterol, and chronic 

GERD who presented to ED with multiple acid reflux related complaints to which 

GI has been consulted. She reports eating crackers and watermelon yesterday 

afternoon with the feeling of the food "getting stuck" in her throat along with 

developing severe regurgitation during the night last night. She states she 

took a Zantac at 5 am this morning in addition to her daily PPI for symptoms 

and then developed a "pulsating sensation along with a clicking sound in her 

throat" that is still present with no alleviating factors. She is followed by 

Dr. Babin a gastroenterologist at [facility] with a recent EGD/colonoscopy in 05/ 2018. She was told she had a "bacterial infection" in her intestines and was 

given antibiotics to which she has since completed. Admit to feeling like her 

throat is dry and that there is still something stuck in it but denies fever, 

wt loss, CP, SOB, abd pain, N/V, odynophagia, signs of bleeding, or diarrhea. 

Also reports having some abd bloating and generalized abd discomfort yesterday 

associated with an episode of constipation to which she took magnesium citrate 

that resulted with her having a large BM this morning with those symptoms now 

resolved. 








Past History


Past Medical History: diabetes, GERD, hypertension, other (high cholesterol)


Past Surgical History: appendectomy, hysterectomy, Other (tubal ligation)


Social history: denies: smoking





Medications and Allergies


 Allergies











Allergy/AdvReac Type Severity Reaction Status Date / Time


 


iodine Allergy  Rash Verified 06/12/18 06:49


 


lisinopril Allergy  Hives Verified 06/12/18 06:48


 


shellfish derived Allergy  Rash Verified 06/12/18 06:49














Review of Systems





- Review of Systems


All systems: negative


Gastrointestinal: constipation, other (dysphagia, regurgitation, "clicking 

sound in throat")





Exam





- Constitutional


Vital Signs: 


 











Temp Pulse Resp BP Pulse Ox


 


 98.0 F   100 H  20   133/78   97 


 


 06/12/18 06:52  06/12/18 06:52  06/12/18 06:52  06/12/18 08:00  06/12/18 08:00











General appearance: no acute distress, obese





- EENT


Eyes: PERRL, EOM intact


ENT: hearing intact





- Respiratory


Respiratory: bilateral: CTA





- Cardiovascular


Rhythm: regular


Heart Sounds: Present: S1 & S2





- Gastrointestinal


General gastrointestinal: Present: soft, non-tender, non-distended, normal 

bowel sounds





- Integumentary


Integumentary: Present: warm, dry





- Neurologic


Neurological: alert and oriented x3





- Labs


CBC & Chem 7: 


 06/12/18 07:46





 06/12/18 07:43


Lab Results: 


 Laboratory Results - last 24 hr











  06/12/18 06/12/18 06/12/18





  07:43 07:46 10:10


 


WBC   8.7 


 


RBC   4.77 


 


Hgb   14.5 H 


 


Hct   42.0 


 


MCV   88 


 


MCH   31 


 


MCHC   35 H 


 


RDW   13.5 


 


Plt Count   244 


 


Lymph % (Auto)   13.9 


 


Mono % (Auto)   5.9 


 


Eos % (Auto)   0.9 


 


Baso % (Auto)   0.6 


 


Lymph #   1.2 


 


Mono #   0.5 


 


Eos #   0.1 


 


Baso #   0.1 


 


Seg Neutrophils %   78.7 H 


 


Seg Neutrophils #   6.8 


 


Sodium  139  


 


Potassium  3.7  


 


Chloride  99.4  


 


Carbon Dioxide  27  


 


Anion Gap  16  


 


BUN  10  


 


Creatinine  0.8  


 


Estimated GFR  > 60  


 


BUN/Creatinine Ratio  13  


 


Glucose  142 H  


 


POC Glucose    99


 


Calcium  9.2  














Assessment and Plan


1.dysphagia


-patient report a feeling like her food is stuck in her throat along with a 

pulsating clicking sound in her neck


-etiology unclear


-neck x-ray- negative


-recommend an EGD today for further evaluation 


-Keep NPO


-further recommendations to follow EGD results


2.GERD


-continue daily PPI


3.constipation


-now resolved


-colonoscopy 05/2018 with negative results


-recommend Miralax 1-2x/day








<OSCAR MERLOS - Last Filed: 06/12/18 14:59>





Exam





- Constitutional


Vital Signs: 


 











Temp Pulse Resp BP Pulse Ox


 


 98.0 F   100 H  20   115/59   96 


 


 06/12/18 06:52  06/12/18 06:52  06/12/18 06:52  06/12/18 13:30  06/12/18 13:30














- Labs


CBC & Chem 7: 


 06/12/18 07:46





 06/12/18 07:43


Lab Results: 


 Laboratory Results - last 24 hr











  06/12/18 06/12/18 06/12/18





  07:43 07:46 10:10


 


WBC   8.7 


 


RBC   4.77 


 


Hgb   14.5 H 


 


Hct   42.0 


 


MCV   88 


 


MCH   31 


 


MCHC   35 H 


 


RDW   13.5 


 


Plt Count   244 


 


Lymph % (Auto)   13.9 


 


Mono % (Auto)   5.9 


 


Eos % (Auto)   0.9 


 


Baso % (Auto)   0.6 


 


Lymph #   1.2 


 


Mono #   0.5 


 


Eos #   0.1 


 


Baso #   0.1 


 


Seg Neutrophils %   78.7 H 


 


Seg Neutrophils #   6.8 


 


Sodium  139  


 


Potassium  3.7  


 


Chloride  99.4  


 


Carbon Dioxide  27  


 


Anion Gap  16  


 


BUN  10  


 


Creatinine  0.8  


 


Estimated GFR  > 60  


 


BUN/Creatinine Ratio  13  


 


Glucose  142 H  


 


POC Glucose    99


 


Calcium  9.2  














Assessment and Plan


Patient seen and examined.  Agree with note above.  reports dysphagia and 

clicking sensation in neck.  tolerating saliva and liquids.  had recent 

endoscopy without esophageal stricture per pt history (had hiatal hernia).  + 

reflux sx's.  will plan for egd in the morning.  okay for clears today and npo 

at midnight. [facility] EMERGENCY DEPT 
[address] [facility] [place] [address] 
[phone] Work/School Note Date: 5/17/2021 To Whom It May concern: 
 
Halie Crump was seen and treated today in the emergency room by the following provider(s): 
Attending Provider: Ivette Juárez MD 
Nurse Practitioner: Mackenzie Wu NP. Please excuse Colin Laird from class on 5/17/2021 Sincerely, 
 
 
 
 
Anthony Morgan RN

## 2021-05-28 ENCOUNTER — TRANSCRIBE ORDER (OUTPATIENT)
Dept: SCHEDULING | Age: 26
End: 2021-05-28

## 2021-05-28 DIAGNOSIS — R10.9 PAIN, ABDOMINAL: Primary | ICD-10-CM

## 2021-05-28 DIAGNOSIS — R10.13 EPIGASTRIC PAIN: ICD-10-CM

## 2021-05-28 DIAGNOSIS — R13.10 DYSPHAGIA: ICD-10-CM

## 2021-05-28 DIAGNOSIS — R10.11 RUQ PAIN: ICD-10-CM

## 2021-05-28 DIAGNOSIS — R12 HEART BURN: ICD-10-CM

## 2021-06-16 ENCOUNTER — OFFICE VISIT (OUTPATIENT)
Dept: ORTHOPEDIC SURGERY | Age: 26
End: 2021-06-16
Payer: MEDICAID

## 2021-06-16 VITALS
HEART RATE: 83 BPM | DIASTOLIC BLOOD PRESSURE: 85 MMHG | HEIGHT: 64 IN | WEIGHT: 218 LBS | BODY MASS INDEX: 37.22 KG/M2 | TEMPERATURE: 97.1 F | SYSTOLIC BLOOD PRESSURE: 123 MMHG | OXYGEN SATURATION: 98 %

## 2021-06-16 DIAGNOSIS — S43.005A DISLOCATION OF LEFT SHOULDER JOINT, INITIAL ENCOUNTER: Primary | ICD-10-CM

## 2021-06-16 PROCEDURE — 99203 OFFICE O/P NEW LOW 30 MIN: CPT | Performed by: ORTHOPAEDIC SURGERY

## 2021-06-16 NOTE — PROGRESS NOTES
Identified pt with two pt identifiers (name and ). Reviewed chart in preparation for visit and have obtained necessary documentation. Doreen Briones is a 32 y.o. female  Chief Complaint   Patient presents with    ED Follow-up     LT shoulder     Visit Vitals  /85 (BP 1 Location: Right arm, BP Patient Position: Sitting, BP Cuff Size: Large adult)   Pulse 83   Temp 97.1 °F (36.2 °C) (Tympanic)   Ht 5' 4\" (1.626 m)   Wt 218 lb (98.9 kg)   SpO2 98%   BMI 37.42 kg/m²     1. Have you been to the ER, urgent care clinic since your last visit? Hospitalized since your last visit? No    2. Have you seen or consulted any other health care providers outside of the [address] [place] [address] since your last visit? Include any pap smears or colon screening.  No

## 2021-06-19 NOTE — PROGRESS NOTES
Patient6/19/2021      CC: Left shoulder pain    HPI:      This is a 32y.o. year old patient who complains of left shoulder pain. This pain has been going on for one month, she dislocated her left shoulder after hitting a wall. Her pain is better, after she was reduced in the ED. This is activity dependent pain. The pain is in the shoulder, anteriorly as well as laterally. This pain is worse with activity and better with rest.  The pain is moderate in nature. So far, the patient has tried pain medications. The patient is right hand dominant. PMH:  History reviewed. No pertinent past medical history. PSxHx:  History reviewed. No pertinent surgical history.     Meds:    Current Outpatient Medications:     ibuprofen (MOTRIN) 800 mg tablet, Take 1 Tab by mouth every eight (8) hours as needed for Pain., Disp: 30 Tab, Rfl: 0    acyclovir (ZOVIRAX) 400 mg tablet, Take 400 mg by mouth two (2) times a day., Disp: , Rfl:     dicyclomine (BENTYL) 20 mg tablet, Take 1 Tab by mouth every six (6) hours as needed for Abdominal Cramps., Disp: 15 Tab, Rfl: 0    butalbital-acetaminophen-caffeine (FIORICET, ESGIC) -40 mg per tablet, Take 1 Tab by mouth every six (6) hours as needed for Headache., Disp: 12 Tab, Rfl: 0    All:  No Known Allergies    Social Hx:  Social History     Socioeconomic History    Marital status: SINGLE     Spouse name: Not on file    Number of children: Not on file    Years of education: Not on file    Highest education level: Not on file   Tobacco Use    Smoking status: Never Smoker    Smokeless tobacco: Never Used   Substance and Sexual Activity    Alcohol use: Yes     Comment: occassionally    Drug use: Yes     Types: Marijuana    Sexual activity: Yes     Partners: Male     Birth control/protection: Condom     Social Determinants of Health     Financial Resource Strain:     Difficulty of Paying Living Expenses:    Food Insecurity:     Worried About Running Out of Food in the Last Year:     Ran Out of Food in the Last Year:    Transportation Needs:     Lack of Transportation (Medical):  Lack of Transportation (Non-Medical):    Physical Activity:     Days of Exercise per Week:     Minutes of Exercise per Session:    Stress:     Feeling of Stress :    Social Connections:     Frequency of Communication with Friends and Family:     Frequency of Social Gatherings with Friends and Family:     Attends Mandaeism Services:     Active Member of Clubs or Organizations:     Attends Club or Organization Meetings:     Marital Status:        Family Hx:  No family history on file. Review of Systems:       General: Denies headache, lethargy, fever, weight loss  Ears/Nose/Throat: Denies ear discharge, drainage, nosebleeds, hoarse voice, dental problems  Cardiovascular: Denies chest pain, shortness of breath  Lungs: Denies chest pain, breathing problems, wheezing, pneumonia  Stomach: Denies stomach pain, heartburn, constipation, irritable bowel  Skin: Denies rash, sores, open wounds  Musculoskeletal: Left shoulder pain which is activity dependent, it is anteriorly on the shoulder  Genitourinary: Denies dysuria, hematuria, polyuria  Gastrointestinal: Denies constipation, obstipation, diarrhea  Neurological: Denies changes in sight, smell, hearing, taste, seizures. Denies loss of consciousness.   Psychiatric: Denies depression, sleep pattern changes, anxiety, change in personality  Endocrine: Denies mood swings, heat or cold intolerance  Hematologic/Lymphatic: Denies anemia, purpura, petechia  Allergic/Immunologic: Denies swelling of throat, pain or swelling at lymph nodes      Physical Examination:    Visit Vitals  /85 (BP 1 Location: Right arm, BP Patient Position: Sitting, BP Cuff Size: Large adult)   Pulse 83   Temp 97.1 °F (36.2 °C) (Tympanic)   Ht 5' 4\" (1.626 m)   Wt 218 lb (98.9 kg)   SpO2 98%   BMI 37.42 kg/m²        General: AOX3, no apparent distress  Psychiatric: mood and affect appropriate  Lungs: breathing is symmetric and unlabored bilaterally  Heart: regular rate and rhythm  Abdomen: no guarding  Head: normocephalic, atraumatic  Skin: No significant abnormalities, good turgor  Sensation intact to light touch: C5-T1 dermatomes  Muscular exam: 5/5 strength in all major muscle groups unless noted in specialty exam.    Extremities      Right upper extremity: Full active and passive range of motion without pain, deformity, no open wound, strength 5/5 in all major muscle groups. Left upper extremity:  Full active and passive range of motion without pain, deformity, no open wound, strength 5/5 in all major muscle groups. Left lower extremity: Patient complains of mild tenderness at the lateral proximal humerus. Range of motion is limited secondary to pain, open can test is positive, impingement maneuver is positive, Speeds and Yergason's tests are equivocal.  Bearhug test is negative. There is no specific tenderness to palpation along the acromioclavicular joint. There is no gross deformity, no obvious muscular atrophy. Sensation is intact light touch in the C5-T1 dermatomes. Cervical range of motion is full and pain-free and does not reproduce any of the symptoms consistent with cervical pathology. Strength is 4 out of 5 with abduction of the shoulder, 5 out of 5 with forward flexion, biceps and triceps as well as hand intrinsics are 5 out of 5 strength. Capillary refill is less than 2 seconds distally. Right lower extremity:  Full active and passive range of motion without pain, deformity, no open wound, strength 5/5 in all major muscle groups. Diagnostics:    Pertinent Xrays:  Xrays are available of the shoulder. These indicate no fractures, dislocations, or osseous abnormalities. Soft tissue is within expected limits. Pre-reduction films indicate alvin-inferior dislocation    Assessment: Dislocation, left shoulder    Plan:     This patient and I did discuss options for this particular pathology. As there is no indication of overt tearing of the rotator cuff, and that in the setting conservative treatment is the standard of care, I have recommended that some combination of oral analgesics, ideally anti-inflammatories, physical therapy exercises, subacromial injections perhaps other topical forms of treatment would be the first-line of treatment for this particular problem. Patient stated their understanding the pathology as well as the anatomy and treatment options. We have agreed to PT. The patient will follow-up approximately 4 to 6 weeks for a clinical check should any symptoms remain. No x-rays are necessary on follow-up. Procedures performed: none    Ms. Ashleigh Hyman has a reminder for a \"due or due soon\" health maintenance. I have asked that she contact her primary care provider for follow-up on this health maintenance.

## 2021-06-28 ENCOUNTER — HOSPITAL ENCOUNTER (OUTPATIENT)
Dept: ULTRASOUND IMAGING | Age: 26
Discharge: HOME | End: 2021-06-28
Attending: INTERNAL MEDICINE
Payer: MEDICAID

## 2021-06-28 DIAGNOSIS — R10.9 PAIN, ABDOMINAL: ICD-10-CM

## 2021-06-28 DIAGNOSIS — R10.11 RUQ PAIN: ICD-10-CM

## 2021-06-28 DIAGNOSIS — R10.13 EPIGASTRIC PAIN: ICD-10-CM

## 2021-06-28 DIAGNOSIS — R12 HEART BURN: ICD-10-CM

## 2021-06-28 DIAGNOSIS — R13.10 DYSPHAGIA: ICD-10-CM

## 2021-06-28 PROCEDURE — 76700 US EXAM ABDOM COMPLETE: CPT

## 2021-07-12 ENCOUNTER — HOSPITAL ENCOUNTER (OUTPATIENT)
Dept: PHYSICAL THERAPY | Age: 26
Discharge: HOME | End: 2021-07-12
Payer: MEDICAID

## 2021-07-12 PROCEDURE — 97161 PT EVAL LOW COMPLEX 20 MIN: CPT | Performed by: PHYSICAL THERAPIST

## 2021-07-12 PROCEDURE — 97110 THERAPEUTIC EXERCISES: CPT | Performed by: PHYSICAL THERAPIST

## 2021-07-12 NOTE — PROGRESS NOTES
[facility]  [address], [address] [facility]    OUTPATIENT PHYSICAL THERAPY    INITIAL EVALUATION      NAME: Indigo Chavez AGE: 32 y.o. GENDER: female  DATE: 7/12/2021  REFERRING PHYSICIAN: Alysha Rivera DO    OBJECTIVE DATA SUMMARY:   Medical Diagnosis: Pain in left shoulder (M25.512)  PT Diagnosis: Other reduced mobility secondary to left shoulder pain   Date of Onset: 5/17/2021  Mechanism of Injury/Chief Complaint: Anterior shoulder dislocation that was reduced in the ER    Present Symptoms: Patient presents with aching pain in left anterior shoulder. Increased pain with activity such as lifting and reaching. Patient is right hand dominant. Functional Deficits and Limitations:   []     Sitting:   [x]    Dressing:   [x]    Reaching:  []     Standing:   [x]     Bathing:   [x]    Lifting:  []     Walking:   [x]     Cooking:   []    Yardwork:  [x]     Sleeping:   [x]     Cleaning:   []     Driving:  [x]     Work Tasks:  []     Recreation:   []    Other:    HISTORY:  Past Medical History:   Past Medical History:   Diagnosis Date    Ill-defined condition     hx dislocated shoulder   No past surgical history on file. 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 \"    Patients goals for therapy: I hope to gain my muscle strength back and prevent future injury    OBJECTIVE DATA SUMMARY:   EXAMINATION/PRESENTATION/DECISION MAKING:   Pain:  Location: left shoulder  Quality: aching  Now: 5/10  Best: 2/10  Worst: 9/10  Factors that improve pain: medication: used and beneficial    Posture:   Mild rounded shoulders    Strength:      LEFT  RIGHT  Shoulder flexion  4+/5  5/5  Shoulder abduction 4/5  5/5    Shoulder ER  5/5  5/5  Shoulder IR  5/5  5/5  Elbow flexion  5/5  5/5  Elbow extension  5/5  5/5  Wrist flexion  5/5  5/5  Wrist extension  5/5  5/5    Range of Motion:   Cervical spine ROM WNL    Left shoulder AAROM in supine:   Flexion: 158 degrees; limited secondary to pain  Abduction: 130 degrees; limited secondary to pain  ER: 75 degrees; limited secondary to pain  IR: 80 degrees; limited secondary to mild pain    Right shoulder AAROM in supine:   Flexion: 180 degrees  Extension: 180 degrees  ER: 90 degrees  IR: 85 degrees    Joint Mobility:   Left GH joint AP mob mild pain    Palpation:   Tender at anterior shoulder     Neurologic Assessment:   Tone: Normal   Sensation: Intact to light touch   Reflexes: NT    Special Tests:   Discomfort with full can     Mobility:   Transitional Movements: WNL    Gait: WNL    Balance:   WNL    Functional Measure:   QuickDASH: 31.8% impaired    Based on the above components, the patient evaluation is determined to be of the following complexity level: LOW     TREATMENT/INTERVENTION:  Modalities (Rationale): None this date    Therapeutic Exercises: to develop strength, endurance, range of motion, and flexibility  Initial HEP provided 7/12/2021: shoulder flexion, abduction, and extension stretches with dowel; shoulder IR stretch with towel; rows with green TB; shoulder ER/IR with green TB; shoulder flexion and scaption to 90 degrees; sidelying shoulder ER    *Please provide patient with green TB at next session*    Exercises in BOLD performed this date:     Standing:   Rows with green TB: 2 sets of 8 reps  Shoulder IR with green TB: 2 sets of 8 reps  Shoulder ER with green TB: 2 sets of 8 reps  Shoulder flexion to 90 degrees with 2#: 10 reps  Shoulder scaption to 90 degrees: 10 reps  Shoulder IR stretch with towel: 5 reps with 15 sec holds  Shoulder flexion stretch with dowel: 5 reps  Shoulder abduction stretch with dowel: 5 reps  Shoulder extension stretch with dowel: 5 reps    Sidelying:   Shoulder ER with 2#: 8 reps    Manual Therapy: for joint mobilization/manipulations and soft tissue mobilization  None this date    Neuro Re-Education: to improve movement, balance, coordination, kinesthetic sense, posture, and proprioception for sitting or standing balance  None this date    Therapeutic Activities: to improve functional performance, power, or agility  None this date    Ambulation/Gait Training:  None this date     Activity tolerance and post treatment pain report:   Good; 2/10    Education:  [x]     Home exercise program provided. Education was provided to the patient on the following topics: Patient provided with initial HEP and educated on importance of regular performance of exercises in pain free ROM. Use of ice for 5-10 mins as needed. Access Code [email or web address]. Patient verbalized understanding of the topics presented. ASSESSMENT:   Shaneka Mcdonnell is a 32 y.o. female who presents with aching pain in left shoulder following anterior shoulder dislocation with reduction on 5/17/2021. Increased pain with activity such as lifting and reaching. Patient with limited left shoulder ROM and strength. She tolerated clinic exercises well without increase in pain. Patient provided with initial HEP and educated on importance of regular performance of exercises in pain free ROM. Physical therapy problems based on objective data include: pain affecting function, decrease ROM, decrease strength, decrease ADL/ functional abilitiies, decrease activity tolerance, decrease flexibility/ joint mobility and decrease transfer abilities . Patient will benefit from skilled intervention to address these impairments. Rehabilitation potential is considered to be Good. Factors which may influence rehabilitation potential include good motivation.       PLAN OF CARE:   Recommendations and Planned Interventions Include:  therapeutic activities, therapeutic exercises, manual therapy, neuro re-education, posture/biomechanics, heat/ice, E-stim, home exercise program, TENS and dry needling    Frequency/Duration:  Patient will benefit from physical therapy visits 1-2 times per week over 8 weeks to optimize improvement in these areas. GOALS  Short term goals  Time frame: 4 weeks  1. Patient will be compliant and independent with the initial HEP as evidenced by being able to perform without cuing. 2. Patient will report a 25% improvement in symptoms. 3. Patient report a 25% improvement in sleeping. 4. Patient will have a 10 degree increase in left shoulder abduction ROM to allow ease with ADLs. 5. Patient will tolerate 20 minutes of clinic activities before increase of symptoms. Long term goals  Time frame: 8 weeks  1. Patient will report pain level decrease to 0/10 to allow increased ease of movement. 2. Patient will have an improved score on the QuickDASH outcome measure by 8 points to demonstrate an increase in functional activity tolerance. 3. Patient will be independent in final individualized HEP. 4. Patient will have an increase in left shoulder abduction ROM to 170 degrees to allow performance of work tasks. 5. Patient will have an increase in left shoulder abduction strength to 5/5 to allow performance of work tasks. 6. Patient will return to lifting at work without being limited by symptoms. 7. Patient will sleep 6-8 hours without being interrupted by pain. [x]     Patient has participated in goal setting and agrees to work toward plan of care. [x]     Patient was instructed to call if questions or concerns arise.     Thank you for this referral.  Beto Restrepo, PT   Time Calculation: 58 mins    Patient Time in clinic:   Start Time: 1300   Stop Time: 1149    TREATMENT PLAN EFFECTIVE DATES:   7/12/2021 TO 9/17/2021  I have read the above plan of care for Mattie Miguel and certify the need for skilled physical therapy services.     Physician Signature: ____________________________________________________    Date: _________________________________________________________________

## 2021-07-19 ENCOUNTER — ANESTHESIA (OUTPATIENT)
Dept: ENDOSCOPY | Age: 26
End: 2021-07-19
Payer: MEDICAID

## 2021-07-19 ENCOUNTER — HOSPITAL ENCOUNTER (OUTPATIENT)
Age: 26
Setting detail: OUTPATIENT SURGERY
Discharge: HOME | End: 2021-07-19
Attending: INTERNAL MEDICINE | Admitting: INTERNAL MEDICINE
Payer: MEDICAID

## 2021-07-19 ENCOUNTER — ANESTHESIA EVENT (OUTPATIENT)
Dept: ENDOSCOPY | Age: 26
End: 2021-07-19
Payer: MEDICAID

## 2021-07-19 VITALS
HEART RATE: 72 BPM | OXYGEN SATURATION: 100 % | DIASTOLIC BLOOD PRESSURE: 96 MMHG | TEMPERATURE: 97.8 F | HEIGHT: 62 IN | BODY MASS INDEX: 39.01 KG/M2 | RESPIRATION RATE: 20 BRPM | WEIGHT: 212 LBS | SYSTOLIC BLOOD PRESSURE: 144 MMHG

## 2021-07-19 PROCEDURE — 74011250636 HC RX REV CODE- 250/636: Performed by: INTERNAL MEDICINE

## 2021-07-19 PROCEDURE — 76040000019: Performed by: INTERNAL MEDICINE

## 2021-07-19 PROCEDURE — 88305 TISSUE EXAM BY PATHOLOGIST: CPT

## 2021-07-19 PROCEDURE — 2709999900 HC NON-CHARGEABLE SUPPLY: Performed by: INTERNAL MEDICINE

## 2021-07-19 PROCEDURE — 74011000250 HC RX REV CODE- 250: Performed by: NURSE ANESTHETIST, CERTIFIED REGISTERED

## 2021-07-19 PROCEDURE — 74011250636 HC RX REV CODE- 250/636: Performed by: NURSE ANESTHETIST, CERTIFIED REGISTERED

## 2021-07-19 PROCEDURE — 77030021593 HC FCPS BIOP ENDOSC BSC -A: Performed by: INTERNAL MEDICINE

## 2021-07-19 PROCEDURE — 76060000031 HC ANESTHESIA FIRST 0.5 HR: Performed by: INTERNAL MEDICINE

## 2021-07-19 RX ORDER — FLUMAZENIL 0.1 MG/ML
0.2 INJECTION INTRAVENOUS
Status: DISCONTINUED | OUTPATIENT
Start: 2021-07-19 | End: 2021-07-19 | Stop reason: HOSPADM

## 2021-07-19 RX ORDER — LIDOCAINE HYDROCHLORIDE 20 MG/ML
INJECTION, SOLUTION EPIDURAL; INFILTRATION; INTRACAUDAL; PERINEURAL AS NEEDED
Status: DISCONTINUED | OUTPATIENT
Start: 2021-07-19 | End: 2021-07-19 | Stop reason: HOSPADM

## 2021-07-19 RX ORDER — SODIUM CHLORIDE 0.9 % (FLUSH) 0.9 %
5-40 SYRINGE (ML) INJECTION AS NEEDED
Status: DISCONTINUED | OUTPATIENT
Start: 2021-07-19 | End: 2021-07-19 | Stop reason: HOSPADM

## 2021-07-19 RX ORDER — FENTANYL CITRATE 50 UG/ML
25-200 INJECTION, SOLUTION INTRAMUSCULAR; INTRAVENOUS
Status: DISCONTINUED | OUTPATIENT
Start: 2021-07-19 | End: 2021-07-19 | Stop reason: HOSPADM

## 2021-07-19 RX ORDER — SODIUM CHLORIDE 9 MG/ML
50 INJECTION, SOLUTION INTRAVENOUS CONTINUOUS
Status: DISCONTINUED | OUTPATIENT
Start: 2021-07-19 | End: 2021-07-19 | Stop reason: HOSPADM

## 2021-07-19 RX ORDER — DEXTROMETHORPHAN/PSEUDOEPHED 2.5-7.5/.8
1.2 DROPS ORAL
Status: DISCONTINUED | OUTPATIENT
Start: 2021-07-19 | End: 2021-07-19 | Stop reason: HOSPADM

## 2021-07-19 RX ORDER — MIDAZOLAM HYDROCHLORIDE 1 MG/ML
.25-5 INJECTION, SOLUTION INTRAMUSCULAR; INTRAVENOUS
Status: DISCONTINUED | OUTPATIENT
Start: 2021-07-19 | End: 2021-07-19 | Stop reason: HOSPADM

## 2021-07-19 RX ORDER — ATROPINE SULFATE 0.1 MG/ML
0.5 INJECTION INTRAVENOUS
Status: DISCONTINUED | OUTPATIENT
Start: 2021-07-19 | End: 2021-07-19 | Stop reason: HOSPADM

## 2021-07-19 RX ORDER — PANTOPRAZOLE SODIUM 40 MG/1
40 TABLET, DELAYED RELEASE ORAL
Qty: 90 TABLET | Refills: 1 | Status: SHIPPED | OUTPATIENT
Start: 2021-07-19 | End: 2021-10-17

## 2021-07-19 RX ORDER — NALOXONE HYDROCHLORIDE 0.4 MG/ML
0.4 INJECTION, SOLUTION INTRAMUSCULAR; INTRAVENOUS; SUBCUTANEOUS
Status: DISCONTINUED | OUTPATIENT
Start: 2021-07-19 | End: 2021-07-19 | Stop reason: HOSPADM

## 2021-07-19 RX ORDER — PROPOFOL 10 MG/ML
INJECTION, EMULSION INTRAVENOUS AS NEEDED
Status: DISCONTINUED | OUTPATIENT
Start: 2021-07-19 | End: 2021-07-19 | Stop reason: HOSPADM

## 2021-07-19 RX ORDER — SODIUM CHLORIDE 0.9 % (FLUSH) 0.9 %
5-40 SYRINGE (ML) INJECTION EVERY 8 HOURS
Status: DISCONTINUED | OUTPATIENT
Start: 2021-07-19 | End: 2021-07-19 | Stop reason: HOSPADM

## 2021-07-19 RX ORDER — EPINEPHRINE 0.1 MG/ML
1 INJECTION INTRACARDIAC; INTRAVENOUS
Status: DISCONTINUED | OUTPATIENT
Start: 2021-07-19 | End: 2021-07-19 | Stop reason: HOSPADM

## 2021-07-19 RX ADMIN — PROPOFOL 100 MG: 10 INJECTION, EMULSION INTRAVENOUS at 11:39

## 2021-07-19 RX ADMIN — LIDOCAINE HYDROCHLORIDE 100 MG: 20 INJECTION, SOLUTION EPIDURAL; INFILTRATION; INTRACAUDAL; PERINEURAL at 11:39

## 2021-07-19 NOTE — ANESTHESIA PREPROCEDURE EVALUATION
Relevant Problems   No relevant active problems       Anesthetic History   No history of anesthetic complications            Review of Systems / Medical History  Patient summary reviewed, nursing notes reviewed and pertinent labs reviewed    Pulmonary  Within defined limits                 Neuro/Psych   Within defined limits           Cardiovascular                  Exercise tolerance: >4 METS     GI/Hepatic/Renal                Endo/Other        Obesity     Other Findings              Physical Exam    Airway  Mallampati: II  TM Distance: > 6 cm  Neck ROM: normal range of motion   Mouth opening: Normal     Cardiovascular    Rhythm: regular           Dental         Pulmonary  Breath sounds clear to auscultation               Abdominal         Other Findings            Anesthetic Plan    ASA: 2  Anesthesia type: MAC          Induction: Intravenous  Anesthetic plan and risks discussed with: Patient      Pt will remove tongue ring

## 2021-07-19 NOTE — PROCEDURES
[address] [facility]  [address] [facility], [address] [facility] (EGD) Procedure Note    Debbie   1995  [id number]      Procedure: Endoscopic Gastroduodenoscopy with biopsy    Indication:  Abdominal pain, epigastric     Pre-operative Diagnosis: see indication above    Post-operative Diagnosis: see findings below    : Blake Rainey MD    Surgical Assistant: Endoscopy Technician-1: Evaristo Calderon  Endoscopy RN-1: Carlos Colindres    Implants:  None    Referring Provider:  None      Anesthesia/Sedation:  MAC anesthesia Propofol        Procedure Details     After infomed consent was obtained for the procedure, with all risks and benefits of procedure explained the patient was taken to the endoscopy suite and placed in the left lateral decubitus position. Following sequential administration of sedation as per above, the endoscope was inserted into the mouth and advanced under direct vision to third portion of the duodenum. A careful inspection was made as the gastroscope was withdrawn, including a retroflexed view of the proximal stomach; findings and interventions are described below. Findings:   Esophagus:hiatal hernia 3 cm in size     Grade 1 erosive esophagitis at G-E junction    Stomach: normal   Random biopsies taken   Duodenum/jejunum: normal, random biopsies taken       Therapies:  none    Specimens: as above         EBL: None      Complications:   None; patient tolerated the procedure well. Impression:    -See post-procedure diagnoses.     Recommendations:  -No NSAIDS, -start her on protonix 40 mg/d for 90 days  -US abdomen was negative  -pain got better  -f/u in 2 months    Signed By: Blake Rainey MD     7/19/2021  11:54 AM

## 2021-07-19 NOTE — DISCHARGE INSTRUCTIONS
[address]  [place], [address] [facility]    EGD DISCHARGE INSTRUCTIONS    Alexi Kirkpatrick  [id number]  1995    Discomfort:  Sore throat- throat lozenges or warm salt water gargle  redness at IV site- apply warm compress to area; if redness or soreness persist- contact your physician  Gaseous discomfort- walking, belching will help relieve any discomfort  You may not operate a vehicle for 12 hours  You may not engage in an occupation involving machinery or appliances for rest of today  You may not drink alcoholic beverages for at least 12 hours  Avoid making any critical decisions for at least 24 hour  DIET  You may resume your regular diet - however -  remember your colon is empty and a heavy meal will produce gas. Avoid these foods:  vegetables, fried / greasy foods, carbonated drinks    ACTIVITY  You may resume your normal daily activities   Spend the remainder of the day resting -  avoid any strenuous activity. CALL M.D. ANY SIGN OF   Increasing pain, nausea, vomiting  Abdominal distension (swelling)  New increased bleeding (oral or rectal)  Fever (chills)  Pain in chest area  Bloody discharge from nose or mouth  Shortness of breath    Follow-up Instructions:   Call Dr. Rajendra Mcgrath for any questions or problems and follow up with him in 2 months  Telephone # [phone]  Take protonix for 3 months    ENDOSCOPY FINDINGS:   Your endoscopy showed small hiatal hernia and esophagitis ( inflammation from acid reflux), rest of exam was normal    Signed By: Rajendra Mcgrath MD     7/19/2021  11:58 AM         Learning About Coronavirus (COVID-19)  Coronavirus (COVID-19): Overview  What is coronavirus (DSCHX-90)? The coronavirus disease (COVID-19) is caused by a virus. It is an illness that was first found in [place], [place], in December 2019. It has since spread worldwide. The virus can cause fever, cough, and trouble breathing.  In severe cases, it can cause pneumonia and make it hard to breathe without help. It can cause death. Coronaviruses are a large group of viruses. They cause the common cold. They also cause more serious illnesses like Middle East respiratory syndrome (MERS) and severe acute respiratory syndrome (SARS). COVID-19 is caused by a novel coronavirus. That means it's a new type that has not been seen in people before. This virus spreads person-to-person through droplets from coughing and sneezing. It can also spread when you are close to someone who is infected. And it can spread when you touch something that has the virus on it, such as a doorknob or a tabletop. What can you do to protect yourself from coronavirus (COVID-19)? The best way to protect yourself from getting sick is to:  · Avoid areas where there is an outbreak. · Avoid contact with people who may be infected. · Wash your hands often with soap or alcohol-based hand sanitizers. · Avoid crowds and try to stay at least 6 feet away from other people. · Wash your hands often, especially after you cough or sneeze. Use soap and water, and scrub for at least 20 seconds. If soap and water aren't available, use an alcohol-based hand . · Avoid touching your mouth, nose, and eyes. What can you do to avoid spreading the virus to others? To help avoid spreading the virus to others:  · Cover your mouth with a tissue when you cough or sneeze. Then throw the tissue in the trash. · Use a disinfectant to clean things that you touch often. · Stay home if you are sick or have been exposed to the virus. Don't go to school, work, or public areas. And don't use public transportation. · If you are sick:  ? Leave your home only if you need to get medical care. But call the doctor's office first so they know you're coming. And wear a face mask, if you have one.  ? If you have a face mask, wear it whenever you're around other people.  It can help stop the spread of the virus when you cough or sneeze. ? Clean and disinfect your home every day. Use household  and disinfectant wipes or sprays. Take special care to clean things that you grab with your hands. These include doorknobs, remote controls, phones, and handles on your refrigerator and microwave. And don't forget countertops, tabletops, bathrooms, and computer keyboards. When to call for help  Call 911 anytime you think you may need emergency care. For example, call if:  · You have severe trouble breathing. (You can't talk at all.)  · You have constant chest pain or pressure. · You are severely dizzy or lightheaded. · You are confused or can't think clearly. · Your face and lips have a blue color. · You pass out (lose consciousness) or are very hard to wake up. Call your doctor now if you develop symptoms such as:  · Shortness of breath. · Fever. · Cough. If you need to get care, call ahead to the doctor's office for instructions before you go. Make sure you wear a face mask, if you have one, to prevent exposing other people to the virus. Where can you get the latest information? The following health organizations are tracking and studying this virus. Their websites contain the most up-to-date information. Jennifer Howard also learn what to do if you think you may have been exposed to the virus. · U.S. Centers for Disease Control and Prevention (CDC): The CDC provides updated news about the disease and travel advice. The website also tells you how to prevent the spread of infection. www.cdc.gov  · World Health Organization [facility]): WHO offers information about the virus outbreaks. WHO also has travel advice. www.who.int  Current as of: April 1, 2020               Content Version: 12.4  © 8230-8041 [employer].    Care instructions adapted under license by your healthcare professional. If you have questions about a medical condition or this instruction, always ask your healthcare professional. [address] any warranty or liability for your use of this information.

## 2021-07-19 NOTE — ANESTHESIA POSTPROCEDURE EVALUATION
Post-Anesthesia Evaluation and Assessment    Patient: Kathi Austin MRN: [id number]  SSN: [id number]    YOB: 1995  Age: 32 y.o. Sex: female      I have evaluated the patient and they are stable and ready for discharge from the PACU. Cardiovascular Function/Vital Signs  Visit Vitals  BP (!) 144/89   Pulse 74   Temp 36.7 °C (98 °F)   Resp 22   Ht 5' 2\" (1.575 m)   Wt 96.2 kg (212 lb)   SpO2 100%   Breastfeeding No   BMI 38.78 kg/m²       Patient is status post MAC anesthesia for Procedure(s):  ESOPHAGOGASTRODUODENOSCOPY (EGD)  ESOPHAGOGASTRODUODENAL (EGD) BIOPSY. Nausea/Vomiting: None    Postoperative hydration reviewed and adequate. Pain:  Pain Scale 1: Numeric (0 - 10) (07/19/21 1031)  Pain Intensity 1: 0 (07/19/21 1031)   Managed    Neurological Status: At baseline    Mental Status, Level of Consciousness: Alert and  oriented to person, place, and time    Pulmonary Status:   O2 Device: Nasal cannula (07/19/21 1147)   Adequate oxygenation and airway patent    Complications related to anesthesia: None    Post-anesthesia assessment completed. No concerns    Signed By: Kaycee Christianson MD     July 19, 2021              Procedure(s):  ESOPHAGOGASTRODUODENOSCOPY (EGD)  ESOPHAGOGASTRODUODENAL (EGD) BIOPSY. MAC    <BSHSIANPOST>    INITIAL Post-op Vital signs:   Vitals Value Taken Time   BP     Temp     Pulse 78 07/19/21 1154   Resp 22 07/19/21 1154   SpO2 98 % 07/19/21 1154   Vitals shown include unvalidated device data.

## 2021-07-19 NOTE — PERIOP NOTES

## 2021-07-21 ENCOUNTER — HOSPITAL ENCOUNTER (OUTPATIENT)
Dept: PHYSICAL THERAPY | Age: 26
Discharge: HOME | End: 2021-07-21
Payer: MEDICAID

## 2021-07-21 PROCEDURE — 97110 THERAPEUTIC EXERCISES: CPT

## 2021-07-21 NOTE — PROGRESS NOTES
[facility]  [address], [address] [facility]    OUTPATIENT PHYSICAL THERAPY DAILY TREATMENT NOTE  VISIT: 2    NAME: Renata Bey AGE: 32 y.o. GENDER: female  DATE: 7/21/2021  REFERRING PHYSICIAN: Laurie Chaves, DO         GOALS  Short term goals  Time frame: 4 weeks  1. Patient will be compliant and independent with the initial HEP as evidenced by being able to perform without cuing. 2. Patient will report a 25% improvement in symptoms. 3. Patient report a 25% improvement in sleeping. 4. Patient will have a 10 degree increase in left shoulder abduction ROM to allow ease with ADLs. 5. Patient will tolerate 20 minutes of clinic activities before increase of symptoms.      Long term goals  Time frame: 8 weeks  1. Patient will report pain level decrease to 0/10 to allow increased ease of movement. 2. Patient will have an improved score on the QuickDASH outcome measure by 8 points to demonstrate an increase in functional activity tolerance. 3. Patient will be independent in final individualized HEP. 4. Patient will have an increase in left shoulder abduction ROM to 170 degrees to allow performance of work tasks. 5. Patient will have an increase in left shoulder abduction strength to 5/5 to allow performance of work tasks. 6. Patient will return to lifting at work without being limited by symptoms. 7. Patient will sleep 6-8 hours without being interrupted by pain. SUBJECTIVE:   Doing well shoulder feeling better. More aching and soreness than pain  Pain In: not pain but aching and sore.  (apprehension feeling)    OBJECTIVE DATA SUMMARY:     TREATMENT/INTERVENTION:  Modalities (Rationale): None this date     Therapeutic Exercises: to develop strength, endurance, range of motion, and flexibility  Initial HEP provided 7/12/2021: shoulder flexion, abduction, and extension stretches with dowel; shoulder IR stretch with towel; rows with green TB; shoulder ER/IR with green TB; shoulder flexion and scaption to 90 degrees; sidelying shoulder ER      Exercises in BOLD performed this date:      Standing:   Rows with green TB:to fatigue  Shoulder IR with green TB: 2 sets of 8 reps  Shoulder ER with green TB: 2 sets of 8 reps  Shoulder flexion to 90 degrees with 2#: 10 reps  Shoulder scaption to 90 degrees: 10 reps  Shoulder IR stretch with towel: 5 reps with 15 sec holds  Shoulder flexion stretch with dowel: 5 reps  Shoulder abduction stretch with dowel: 5 reps  Shoulder extension stretch with dowel: 5 reps  Serratus hugs x15red band   Counter pushups x5, with plus x5     Sidelying:   Shoulder ER with 2#: to fatigue    bentover rows 5# kettle bell  bentover horz abd no weights x15  bentover  Shoulder ext no weight  Plank on mat [id number]     Manual Therapy: for joint mobilization/manipulations and soft tissue mobilization  None this date     Neuro Re-Education: to improve movement, balance, coordination, kinesthetic sense, posture, and proprioception for sitting or standing balance  None this date     Therapeutic Activities: to improve functional performance, power, or agility  None this date     Ambulation/Gait Training:  None this date       Activity tolerance and post treatment pain report:   good  Pain Out: 0    Education:  Education was provided to the patient on the following topics: HEP, gym activities. [x]    No changes were made to the home exercise program.  []    The following changes were made to the home exercise program:   Patient verbalized understanding of the topics presented. ASSESSMENT:   Good toleration of initial follow up. Educated in depth of scapular strengthening and stabilization with good corrections made to form. Will continue to benefit from skilled PT to restore pain free ROM.     Patients progression toward goals is as follows:  [x]     Improving appropriately and progressing toward goals  []     Improving slowly and progressing toward goals  [] Not making progress toward goals and plan of care will be adjusted    PLAN OF CARE:   Patient continues to benefit from skilled intervention to address the above impairments. [x]    Continue treatment per established plan of care.   []     Recommend the following changes to the plan of care:     Recommendations/Intent for next treatment: progress scapular strengthening, CKC exercises    Dinorah Cancino, GUERITA   Time Calculation: 48 mins  Patient Time in clinic:   Start Time: 1332   Stop Time: [id number]

## 2021-07-28 ENCOUNTER — HOSPITAL ENCOUNTER (OUTPATIENT)
Dept: PHYSICAL THERAPY | Age: 26
Discharge: HOME | End: 2021-07-28
Payer: MEDICAID

## 2021-07-28 ENCOUNTER — OFFICE VISIT (OUTPATIENT)
Dept: ORTHOPEDIC SURGERY | Age: 26
End: 2021-07-28
Payer: MEDICAID

## 2021-07-28 VITALS
TEMPERATURE: 97.3 F | RESPIRATION RATE: 18 BRPM | DIASTOLIC BLOOD PRESSURE: 79 MMHG | SYSTOLIC BLOOD PRESSURE: 116 MMHG | OXYGEN SATURATION: 99 % | HEART RATE: 89 BPM | HEIGHT: 62 IN | BODY MASS INDEX: 39.38 KG/M2 | WEIGHT: 214 LBS

## 2021-07-28 DIAGNOSIS — S43.005A DISLOCATION OF LEFT SHOULDER JOINT, INITIAL ENCOUNTER: Primary | ICD-10-CM

## 2021-07-28 PROCEDURE — 97140 MANUAL THERAPY 1/> REGIONS: CPT | Performed by: PHYSICAL THERAPIST

## 2021-07-28 PROCEDURE — 99212 OFFICE O/P EST SF 10 MIN: CPT | Performed by: ORTHOPAEDIC SURGERY

## 2021-07-28 PROCEDURE — 97110 THERAPEUTIC EXERCISES: CPT | Performed by: PHYSICAL THERAPIST

## 2021-07-28 NOTE — PROGRESS NOTES
[facility]  [address], [address] [facility]    OUTPATIENT PHYSICAL THERAPY DAILY TREATMENT NOTE  VISIT: 3    NAME: Mac Jorge AGE: 32 y.o. GENDER: female  DATE: 7/28/2021  REFERRING PHYSICIAN: Miah Cho DO     GOALS  Short term goals  Time frame: 4 weeks  1. Patient will be compliant and independent with the initial HEP as evidenced by being able to perform without cuing. 2. Patient will report a 25% improvement in symptoms. 3. Patient report a 25% improvement in sleeping. 4. Patient will have a 10 degree increase in left shoulder abduction ROM to allow ease with ADLs. 5. Patient will tolerate 20 minutes of clinic activities before increase of symptoms.      Long term goals  Time frame: 8 weeks  1. Patient will report pain level decrease to 0/10 to allow increased ease of movement. 2. Patient will have an improved score on the QuickDASH outcome measure by 8 points to demonstrate an increase in functional activity tolerance. 3. Patient will be independent in final individualized HEP. 4. Patient will have an increase in left shoulder abduction ROM to 170 degrees to allow performance of work tasks. 5. Patient will have an increase in left shoulder abduction strength to 5/5 to allow performance of work tasks. 6. Patient will return to lifting at work without being limited by symptoms. 7. Patient will sleep 6-8 hours without being interrupted by pain. SUBJECTIVE:   \"It feels pretty good. It's sore but better than it was. \"    Pain In: 0/10 left shoulder    OBJECTIVE DATA SUMMARY:   Objective data from initial evaluation:  EXAMINATION/PRESENTATION/DECISION MAKING:   Pain:  Location: left shoulder  Quality: aching  Now: 5/10  Best: 2/10  Worst: 9/10  Factors that improve pain: medication: used and beneficial     Posture:   Mild rounded shoulders     Strength:                                          LEFT                  RIGHT  Shoulder flexion 4+/5                   5/5  Shoulder abduction        4/5                     5/5                       Shoulder ER                   5/5                     5/5  Shoulder IR                    5/5                     5/5  Elbow flexion                  5/5                     5/5  Elbow extension              5/5                     5/5  Wrist flexion                    5/5                     5/5  Wrist extension               5/5                     5/5     Range of Motion:   Cervical spine ROM WNL     Left shoulder AAROM in supine:   Flexion: 158 degrees; limited secondary to pain  Abduction: 130 degrees; limited secondary to pain  ER: 75 degrees; limited secondary to pain  IR: 80 degrees; limited secondary to mild pain     Right shoulder AAROM in supine:   Flexion: 180 degrees  Extension: 180 degrees  ER: 90 degrees  IR: 85 degrees     Joint Mobility:   Left GH joint AP mob mild pain     Palpation:   Tender at anterior shoulder      Neurologic Assessment:               Tone: Normal               Sensation: Intact to light touch               Reflexes: NT     Special Tests:   Discomfort with full can      Mobility:               Transitional Movements: WNL                Gait: WNL     Balance:   WNL     Functional Measure:   QuickDASH: 31.8% impaired     TREATMENT/INTERVENTION:  Modalities (Rationale): None this date     Therapeutic Exercises: to develop strength, endurance, range of motion, and flexibility  Initial HEP provided 7/12/2021: shoulder flexion, abduction, and extension stretches with dowel; shoulder IR stretch with towel; rows with green TB; shoulder ER/IR with green TB; shoulder flexion and scaption to 90 degrees; sidelying shoulder ER      Exercises in BOLD performed this date:      Standing:   Rows with blue TB: 3 sets of 10 reps   Shoulder IR with blue TB: 2 sets of 10 reps  Shoulder ER with blue TB: 2 sets of 10 reps  Shoulder flexion to 90 degrees with 2#: 10 reps  Shoulder scaption to 90 degrees 2#: 10 reps  Flys with red TB: 2 sets of 10 reps   Counter pushups x5, with plus x5  Push ups on yellow physioball: 10 reps  Inverted rows 5# at [employer]: 2 sets of 10 reps  Shoulder IR stretch with towel: 2 reps with 20 sec holds  Shoulder flexion stretch against wall: 20 sec hold    Sidelying:   Shoulder ER with 2#: to fatigue    Prone:  Plank on mat [id number]     Manual Therapy: for joint mobilization/manipulations and soft tissue mobilization  Left GH AP joint mobs and inferior glides, grade 2-3  Left shoulder PROM flexion, abduction, ER, and IR     Neuro Re-Education: to improve movement, balance, coordination, kinesthetic sense, posture, and proprioception for sitting or standing balance  None this date     Therapeutic Activities: to improve functional performance, power, or agility  None this date    Activity tolerance and post treatment pain report:   good  Pain Out: 0/10    Education:  Education was provided to the patient on the following topics: continue HEP in pain free ROM; gradual increase in resistance  [x]    No changes were made to the home exercise program.  []    The following changes were made to the home exercise program:   Patient verbalized understanding of the topics presented. ASSESSMENT:   Patient presents with continued soreness in left shoulder, but no pain. Patient with regular performance of HEP, and has returned to the gym 2-3 days per week. Patient tolerated clinic exercises well with improved form noted. Patient with pain relief and slight increase in shoulder ROM following manual therapy. Excellent participation during session.      Patients progression toward goals is as follows:  [x]     Improving appropriately and progressing toward goals  []     Improving slowly and progressing toward goals  []     Not making progress toward goals and plan of care will be adjusted    PLAN OF CARE:   Patient continues to benefit from skilled intervention to address the above impairments. [x]    Continue treatment per established plan of care.   []     Recommend the following changes to the plan of care:     Recommendations/Intent for next treatment: progress scapular strengthening, CKC exercises    Santi Crespo, PT   Time Calculation: 45 mins  Patient Time in clinic:   Start Time: 1330   Stop Time: [id number] [phone]

## 2021-07-28 NOTE — PROGRESS NOTES
7/28/2021      CC: Left shoulder dislocation    HPI:      This is a 32y.o. year old female who presents for a follow up visit. The patient was last seen and diagnosed with left shoulder dislocation. The patient's treatments since the most recent visit have comprised of physical therapy. The patient has had very good relief of the chief complaint. She continues to be in therapy. PMH:  Past Medical History:   Diagnosis Date    Ill-defined condition     hx dislocated shoulder       PSxHx:  No past surgical history on file. Meds:    Current Outpatient Medications:     pantoprazole (PROTONIX) 40 mg tablet, Take 1 Tablet by mouth Daily (before breakfast) for 90 days. , Disp: 90 Tablet, Rfl: 1    acyclovir (ZOVIRAX) 400 mg tablet, Take 400 mg by mouth two (2) times a day., Disp: , Rfl:     butalbital-acetaminophen-caffeine (FIORICET, ESGIC) -40 mg per tablet, Take 1 Tab by mouth every six (6) hours as needed for Headache., Disp: 12 Tab, Rfl: 0    All:  No Known Allergies    Social Hx:  Social History     Socioeconomic History    Marital status: SINGLE     Spouse name: Not on file    Number of children: Not on file    Years of education: Not on file    Highest education level: Not on file   Tobacco Use    Smoking status: Never Smoker    Smokeless tobacco: Never Used   Vaping Use    Vaping Use: Never used   Substance and Sexual Activity    Alcohol use: Yes     Comment: occasionally    Drug use: Yes     Types: Marijuana    Sexual activity: Yes     Partners: Male     Birth control/protection: Condom     Social Determinants of Health     Financial Resource Strain:     Difficulty of Paying Living Expenses:    Food Insecurity:     Worried About Running Out of Food in the Last Year:     Ran Out of Food in the Last Year:    Transportation Needs:     Lack of Transportation (Medical):      Lack of Transportation (Non-Medical):    Physical Activity:     Days of Exercise per Week:     Minutes of Exercise per Session:    Stress:     Feeling of Stress :    Social Connections:     Frequency of Communication with Friends and Family:     Frequency of Social Gatherings with Friends and Family:     Attends Holiness Services:     Active Member of Clubs or Organizations:     Attends Club or Organization Meetings:     Marital Status:        Family Hx:  Family History   Problem Relation Age of Onset    Diabetes Mother     Hypertension Father          Review of Systems:       General: Denies headache, lethargy, fever, weight loss  Ears/Nose/Throat: Denies ear discharge, drainage, nosebleeds, hoarse voice, dental problems  Cardiovascular: Denies chest pain, shortness of breath  Lungs: Denies chest pain, breathing problems, wheezing, pneumonia  Stomach: Denies stomach pain, heartburn, constipation, irritable bowel  Skin: Denies rash, sores, open wounds  Musculoskeletal: Left shoulder pain  Genitourinary: Denies dysuria, hematuria, polyuria  Gastrointestinal: Denies constipation, obstipation, diarrhea  Neurological: Denies changes in sight, smell, hearing, taste, seizures. Denies loss of consciousness.   Psychiatric: Denies depression, sleep pattern changes, anxiety, change in personality  Endocrine: Denies mood swings, heat or cold intolerance  Hematologic/Lymphatic: Denies anemia, purpura, petechia  Allergic/Immunologic: Denies swelling of throat, pain or swelling at lymph nodes      Physical Examination:    Visit Vitals  /79 (BP 1 Location: Left arm, BP Patient Position: Sitting, BP Cuff Size: Large adult)   Pulse 89   Temp 97.3 °F (36.3 °C) (Temporal)   Resp 18   Ht 5' 2\" (1.575 m)   Wt 97.1 kg (214 lb)   SpO2 99%   BMI 39.14 kg/m²        General: AOX3, no apparent distress  Psychiatric: mood and affect appropriate  Lungs: breathing is symmetric and unlabored bilaterally  Heart: regular rate and rhythm  Abdomen: no guarding  Head: normocephalic, atraumatic  Skin: No significant abnormalities, good turgor  Sensation intact to light touch: C5-T1 dermatomes  Muscular exam: 5/5 strength in all major muscle groups unless noted in specialty exam.    Extremities      Right upper extremity: Extremity is examined, no evidence of gross deformity, no gross motor or sensory deficit. Full active and passive range of motion is noted. Muscle tone and bulk is within normal expected limits. Capillary refill is less than 2 seconds distally. Left upper extremity: Extremity is examined, no evidence of gross deformity, no gross motor or sensory deficit. Full active and passive range of motion is noted. Muscle tone and bulk is within normal expected limits. Capillary refill is less than 2 seconds distally. Diagnostics:    Pertinent Diagnostics: None    Assessment: Status post left shoulder dislocation  Plan: This patient is doing quite well, we discussed the pathoanatomy as well as the timeframe. I discussed with her that she is slightly more prone to dislocation at this point due to age as well as previous dislocation. In general, she would like to proceed with continued physical therapy for the time being. I have advised her that she can wean from this as soon as she is comfortable and feels independent with her home exercise program.  She stated her understanding and satisfaction. Follow-up will be in 4 weeks. No x-rays. Ms. Philip Alas has a reminder for a \"due or due soon\" health maintenance. I have asked that she contact her primary care provider for follow-up on this health maintenance.

## 2021-07-28 NOTE — PROGRESS NOTES
Chief Complaint   Patient presents with    Shoulder Pain     1m f/u, left shoulder #2       1. Have you been to the ER, urgent care clinic since your last visit? Hospitalized since your last visit? No    2. Have you seen or consulted any other health care providers outside of the [address] [place] [address] since your last visit? Include any pap smears or colon screening. Continuing PT. Endoscope, hiatal hernia.

## 2021-08-04 ENCOUNTER — HOSPITAL ENCOUNTER (OUTPATIENT)
Dept: PHYSICAL THERAPY | Age: 26
Discharge: HOME | End: 2021-08-04
Payer: MEDICAID

## 2021-08-04 PROCEDURE — 97110 THERAPEUTIC EXERCISES: CPT | Performed by: PHYSICAL THERAPIST

## 2021-08-04 PROCEDURE — 97140 MANUAL THERAPY 1/> REGIONS: CPT | Performed by: PHYSICAL THERAPIST

## 2021-08-04 NOTE — PROGRESS NOTES
[facility]  [address], [address] [facility]    OUTPATIENT PHYSICAL THERAPY DAILY TREATMENT NOTE  VISIT: 4    NAME: Rexanne Frankel AGE: 32 y.o. GENDER: female  DATE: 8/4/2021  REFERRING PHYSICIAN: Marli Harris DO     GOALS  Short term goals  Time frame: 4 weeks  1. Patient will be compliant and independent with the initial HEP as evidenced by being able to perform without cuing. 2. Patient will report a 25% improvement in symptoms. 3. Patient report a 25% improvement in sleeping. 4. Patient will have a 10 degree increase in left shoulder abduction ROM to allow ease with ADLs. 5. Patient will tolerate 20 minutes of clinic activities before increase of symptoms.      Long term goals  Time frame: 8 weeks  1. Patient will report pain level decrease to 0/10 to allow increased ease of movement. 2. Patient will have an improved score on the QuickDASH outcome measure by 8 points to demonstrate an increase in functional activity tolerance. 3. Patient will be independent in final individualized HEP. 4. Patient will have an increase in left shoulder abduction ROM to 170 degrees to allow performance of work tasks. 5. Patient will have an increase in left shoulder abduction strength to 5/5 to allow performance of work tasks. 6. Patient will return to lifting at work without being limited by symptoms. 7. Patient will sleep 6-8 hours without being interrupted by pain. SUBJECTIVE:   \"It feels pretty good.  Some discomfort here and there\"    Pain In: 0/10 left shoulder    OBJECTIVE DATA SUMMARY:   Objective data from initial evaluation:  EXAMINATION/PRESENTATION/DECISION MAKING:   Pain:  Location: left shoulder  Quality: aching  Now: 5/10  Best: 2/10  Worst: 9/10  Factors that improve pain: medication: used and beneficial     Posture:   Mild rounded shoulders     Strength:                                          LEFT                  RIGHT  Shoulder flexion 4+/5                   5/5  Shoulder abduction        4/5                     5/5                       Shoulder ER                   5/5                     5/5  Shoulder IR                    5/5                     5/5  Elbow flexion                  5/5                     5/5  Elbow extension              5/5                     5/5  Wrist flexion                    5/5                     5/5  Wrist extension               5/5                     5/5     Range of Motion:   Cervical spine ROM WNL     Left shoulder AAROM in supine:   Flexion: 158 degrees; limited secondary to pain  Abduction: 130 degrees; limited secondary to pain  ER: 75 degrees; limited secondary to pain  IR: 80 degrees; limited secondary to mild pain     Right shoulder AAROM in supine:   Flexion: 180 degrees  Extension: 180 degrees  ER: 90 degrees  IR: 85 degrees     Joint Mobility:   Left GH joint AP mob mild pain     Palpation:   Tender at anterior shoulder      Neurologic Assessment:               Tone: Normal               Sensation: Intact to light touch               Reflexes: NT     Special Tests:   Discomfort with full can      Mobility:               Transitional Movements: WNL                Gait: WNL     Balance:   WNL     Functional Measure:   QuickDASH: 31.8% impaired     TREATMENT/INTERVENTION:  Modalities (Rationale): None this date     Therapeutic Exercises: to develop strength, endurance, range of motion, and flexibility  Initial HEP provided 7/12/2021: shoulder flexion, abduction, and extension stretches with dowel; shoulder IR stretch with towel; rows with green TB; shoulder ER/IR with green TB; shoulder flexion and scaption to 90 degrees; sidelying shoulder ER      Exercises in BOLD performed this date:      Standing:   Rows with blue TB: 3 sets of 10 reps   Shoulder IR with blue TB: 2 sets of 10 reps  Shoulder ER with blue TB: 2 sets of 10 reps  Shoulder flexion to 90 degrees with 2#: 10 reps  Shoulder scaption to 90 degrees 2#: 10 reps  Overhead press 2#: 10 reps  Flys with red TB: 2 sets of 10 reps   Counter pushups x5, with plus x5  Push ups on yellow physioball: 10 reps  Inverted rows 5# at Quantum: 10 reps  Shoulder IR stretch with towel: 2 reps with 20 sec holds  Shoulder flexion stretch against wall: 2x20 sec hold    Sidelying:   Shoulder ER with 2#: to fatigue    Prone:  Plank on mat [id number]     Manual Therapy: for joint mobilization/manipulations and soft tissue mobilization  Left GH AP joint mobs and inferior glides, grade 2-3  Left shoulder PROM flexion, abduction, ER, and IR     Neuro Re-Education: to improve movement, balance, coordination, kinesthetic sense, posture, and proprioception for sitting or standing balance  None this date     Therapeutic Activities: to improve functional performance, power, or agility  None this date    Activity tolerance and post treatment pain report:   good  Pain Out: 0/10    Education:  Education was provided to the patient on the following topics: continue HEP in pain free ROM; gradual increase in resistance  [x]    No changes were made to the home exercise program.  []    The following changes were made to the home exercise program:   Patient verbalized understanding of the topics presented. ASSESSMENT:   Patient presents with improving strength and activity tolerance. She has occasional soreness and apprehension in left shoulder. Patient with regular performance of HEP, but did not go to the gym this week. Patient tolerated clinic exercises well with improved form noted. Patient with pain relief following manual therapy. Excellent participation during session.      Patients progression toward goals is as follows:  [x]     Improving appropriately and progressing toward goals  []     Improving slowly and progressing toward goals  []     Not making progress toward goals and plan of care will be adjusted    PLAN OF CARE:   Patient continues to benefit from skilled intervention to address the above impairments. [x]    Continue treatment per established plan of care.   []     Recommend the following changes to the plan of care:     Recommendations/Intent for next treatment: progress scapular strengthening, CKC exercises    Bibi Jo PT   Time Calculation: 30 mins  Patient Time in clinic:   Start Time: 0830   Stop Time: 0900

## 2021-08-11 ENCOUNTER — HOSPITAL ENCOUNTER (OUTPATIENT)
Dept: PHYSICAL THERAPY | Age: 26
Discharge: HOME | End: 2021-08-11
Payer: MEDICAID

## 2021-08-11 NOTE — PROGRESS NOTES
[facility]  [address], [address] [facility]    OUTPATIENT PHYSICAL THERAPY      8/11/2021:  Marcela November was not seen on this date for physical therapy for the following reasons:    [x]     Patient called to cancel the visit for the following reasons: schedule conflict  []     Patient missed the visit and did not call to cancel.     Eddie Colindres, PT

## 2021-08-18 ENCOUNTER — HOSPITAL ENCOUNTER (OUTPATIENT)
Dept: PHYSICAL THERAPY | Age: 26
Discharge: HOME | End: 2021-08-18
Payer: MEDICAID

## 2021-08-18 PROCEDURE — 97140 MANUAL THERAPY 1/> REGIONS: CPT | Performed by: PHYSICAL THERAPIST

## 2021-08-18 PROCEDURE — 97110 THERAPEUTIC EXERCISES: CPT | Performed by: PHYSICAL THERAPIST

## 2021-08-18 NOTE — PROGRESS NOTES
[facility]  [address], [address] [facility]    OUTPATIENT PHYSICAL THERAPY DAILY TREATMENT NOTE  VISIT: 5    NAME: Mery Belle AGE: 32 y.o. GENDER: female  DATE: 8/18/2021  REFERRING PHYSICIAN: Sabrina Sharp, DO     GOALS  Short term goals  Time frame: 4 weeks  1. Patient will be compliant and independent with the initial HEP as evidenced by being able to perform without cuing. 2. Patient will report a 25% improvement in symptoms. 3. Patient report a 25% improvement in sleeping. 4. Patient will have a 10 degree increase in left shoulder abduction ROM to allow ease with ADLs. 5. Patient will tolerate 20 minutes of clinic activities before increase of symptoms.      Long term goals  Time frame: 8 weeks  1. Patient will report pain level decrease to 0/10 to allow increased ease of movement. 2. Patient will have an improved score on the QuickDASH outcome measure by 8 points to demonstrate an increase in functional activity tolerance. 3. Patient will be independent in final individualized HEP. 4. Patient will have an increase in left shoulder abduction ROM to 170 degrees to allow performance of work tasks. 5. Patient will have an increase in left shoulder abduction strength to 5/5 to allow performance of work tasks. 6. Patient will return to lifting at work without being limited by symptoms. 7. Patient will sleep 6-8 hours without being interrupted by pain.        SUBJECTIVE:   \"It's been a busy week at work and with classes starting so my arm is a little more sore than usual.\"  Pain In: 0/10 left shoulder    OBJECTIVE DATA SUMMARY:   Objective data from initial evaluation:  EXAMINATION/PRESENTATION/DECISION MAKING:   Pain:  Location: left shoulder  Quality: aching  Now: 5/10  Best: 2/10  Worst: 9/10  Factors that improve pain: medication: used and beneficial     Posture:   Mild rounded shoulders     Strength:                                          LEFT RIGHT  Shoulder flexion              4+/5                   5/5  Shoulder abduction        4/5                     5/5                       Shoulder ER                   5/5                     5/5  Shoulder IR                    5/5                     5/5  Elbow flexion                  5/5                     5/5  Elbow extension              5/5                     5/5  Wrist flexion                    5/5                     5/5  Wrist extension               5/5                     5/5     Range of Motion:   Cervical spine ROM WNL     Left shoulder AAROM in supine:   Flexion: 158 degrees; limited secondary to pain  Abduction: 130 degrees; limited secondary to pain  ER: 75 degrees; limited secondary to pain  IR: 80 degrees; limited secondary to mild pain     Right shoulder AAROM in supine:   Flexion: 180 degrees  Extension: 180 degrees  ER: 90 degrees  IR: 85 degrees     Joint Mobility:   Left GH joint AP mob mild pain     Palpation:   Tender at anterior shoulder      Neurologic Assessment:               Tone: Normal               Sensation: Intact to light touch               Reflexes: NT     Special Tests:   Discomfort with full can      Mobility:               Transitional Movements: WNL                Gait: WNL     Balance:   WNL     Functional Measure:   QuickDASH: 31.8% impaired     TREATMENT/INTERVENTION:  Modalities (Rationale): None this date     Therapeutic Exercises: to develop strength, endurance, range of motion, and flexibility  Initial HEP provided 7/12/2021: shoulder flexion, abduction, and extension stretches with dowel; shoulder IR stretch with towel; rows with green TB; shoulder ER/IR with green TB; shoulder flexion and scaption to 90 degrees; sidelying shoulder ER      Exercises in BOLD performed this date:      Standing:   Rows with blue TB: 3 sets of 10 reps   Shoulder IR with blue TB: 2 sets of 10 reps  Shoulder ER with blue TB: 2 sets of 10 reps  Shoulder flexion to 90 degrees with 2#: 10 reps  Shoulder scaption to 90 degrees 2#: 10 reps  Overhead press 2#: 10 reps  Flys with red TB: 2 sets of 10 reps   Counter pushups x5, with plus x5  Push ups on yellow physioball: 10 reps  Inverted rows 5# at Quantum: 10 reps  Shoulder IR stretch with towel: 2 reps with 20 sec holds  Shoulder flexion stretch against wall: 2x20 sec hold  Triceps pull downs 20# at Quantum: 2 sets of 10 reps  Lat pull downs 20# at Quantum: 2 sets of 10 reps    Sidelying:   Shoulder ER with 2#: to fatigue    Prone:  Plank on mat [id number]     Manual Therapy: for joint mobilization/manipulations and soft tissue mobilization  Left GH AP joint mobs and inferior glides, grade 2-3  Left shoulder PROM flexion, abduction, ER, and IR     Neuro Re-Education: to improve movement, balance, coordination, kinesthetic sense, posture, and proprioception for sitting or standing balance  None this date     Therapeutic Activities: to improve functional performance, power, or agility  None this date    Activity tolerance and post treatment pain report:   good  Pain Out: 0/10    Education:  Education was provided to the patient on the following topics: continue HEP in pain free ROM; gradual increase in resistance  [x]    No changes were made to the home exercise program.  []    The following changes were made to the home exercise program:   Patient verbalized understanding of the topics presented. ASSESSMENT:   Patient presents with no pain today but increased soreness in left arm after work duties yesterday. Patient with improving strength and activity tolerance. Patient with regular performance of HEP. Patient tolerated clinic exercises and manual therapy.      Patients progression toward goals is as follows:  [x]     Improving appropriately and progressing toward goals  []     Improving slowly and progressing toward goals  []     Not making progress toward goals and plan of care will be adjusted    PLAN OF CARE:   Patient continues to benefit from skilled intervention to address the above impairments. [x]    Continue treatment per established plan of care.   []     Recommend the following changes to the plan of care:     Recommendations/Intent for next treatment: progress scapular strengthening    Bard Aj PT   Time Calculation: 47 mins  Patient Time in clinic:   Start Time: 1403   Stop Time: 1450

## 2021-08-25 ENCOUNTER — HOSPITAL ENCOUNTER (OUTPATIENT)
Dept: PHYSICAL THERAPY | Age: 26
Discharge: HOME | End: 2021-08-25
Payer: MEDICAID

## 2021-08-25 PROCEDURE — 97110 THERAPEUTIC EXERCISES: CPT | Performed by: PHYSICAL THERAPIST

## 2021-08-25 PROCEDURE — 97140 MANUAL THERAPY 1/> REGIONS: CPT | Performed by: PHYSICAL THERAPIST

## 2021-08-25 NOTE — PROGRESS NOTES
[facility]  [address], [address] [facility]    OUTPATIENT PHYSICAL THERAPY DAILY TREATMENT NOTE  VISIT: 6    NAME: Tera Garg AGE: 32 y.o. GENDER: female  DATE: 8/25/2021  REFERRING PHYSICIAN: Dk Keane, DO     GOALS  Short term goals  Time frame: 4 weeks  1. Patient will be compliant and independent with the initial HEP as evidenced by being able to perform without cuing. 2. Patient will report a 25% improvement in symptoms. 3. Patient report a 25% improvement in sleeping. 4. Patient will have a 10 degree increase in left shoulder abduction ROM to allow ease with ADLs. 5. Patient will tolerate 20 minutes of clinic activities before increase of symptoms.      Long term goals  Time frame: 8 weeks  1. Patient will report pain level decrease to 0/10 to allow increased ease of movement. 2. Patient will have an improved score on the QuickDASH outcome measure by 8 points to demonstrate an increase in functional activity tolerance. 3. Patient will be independent in final individualized HEP. 4. Patient will have an increase in left shoulder abduction ROM to 170 degrees to allow performance of work tasks. 5. Patient will have an increase in left shoulder abduction strength to 5/5 to allow performance of work tasks. 6. Patient will return to lifting at work without being limited by symptoms. 7. Patient will sleep 6-8 hours without being interrupted by pain. SUBJECTIVE:   \"I feel okay. \"    Pain In: 0/10 left shoulder    OBJECTIVE DATA SUMMARY:   Objective data from initial evaluation:  EXAMINATION/PRESENTATION/DECISION MAKING:   Pain:  Location: left shoulder  Quality: aching  Now: 5/10  Best: 2/10  Worst: 9/10  Factors that improve pain: medication: used and beneficial     Posture:   Mild rounded shoulders     Strength:                                          LEFT                  RIGHT  Shoulder flexion              4+/5                   5/5  Shoulder abduction        4/5                     5/5                       Shoulder ER                   5/5                     5/5  Shoulder IR                    5/5                     5/5  Elbow flexion                  5/5                     5/5  Elbow extension              5/5                     5/5  Wrist flexion                    5/5                     5/5  Wrist extension               5/5                     5/5     Range of Motion:   Cervical spine ROM WNL     Left shoulder AAROM in supine:   Flexion: 158 degrees; limited secondary to pain  Abduction: 130 degrees; limited secondary to pain  ER: 75 degrees; limited secondary to pain  IR: 80 degrees; limited secondary to mild pain     Right shoulder AAROM in supine:   Flexion: 180 degrees  Extension: 180 degrees  ER: 90 degrees  IR: 85 degrees     Joint Mobility:   Left GH joint AP mob mild pain     Palpation:   Tender at anterior shoulder      Neurologic Assessment:               Tone: Normal               Sensation: Intact to light touch               Reflexes: NT     Special Tests:   Discomfort with full can      Mobility:               Transitional Movements: WNL                Gait: WNL     Balance:   WNL     Functional Measure:   QuickDASH: 31.8% impaired     TREATMENT/INTERVENTION:  Modalities (Rationale): None this date     Therapeutic Exercises: to develop strength, endurance, range of motion, and flexibility  Initial HEP provided 7/12/2021: shoulder flexion, abduction, and extension stretches with dowel; shoulder IR stretch with towel; rows with green TB; shoulder ER/IR with green TB; shoulder flexion and scaption to 90 degrees; sidelying shoulder ER      Exercises in BOLD performed this date:      Seated:  Rows 20# Quantum: 3 sets of 10 reps     Standing:   Shoulder IR 5# Quantum: 2 sets of 10 reps  Shoulder ER 5# Quantum: 2 sets of 10 reps  Shoulder flexion to 90 degrees with 3#: 10 reps  Shoulder scaption to 90 degrees 3#: 10 reps  Overhead press 3#: 10 reps  Shoulder ER with over head press 2#: 2 sets of 5 reps  Flys with blue TB: 2 sets of 10 reps   Counter pushups x5, with plus x5  Push ups on yellow physioball: 10 reps  Inverted rows 10# at Quantum: 2 sets of 10 reps  Shoulder IR stretch with towel: 2 reps with 20 sec holds  Shoulder flexion stretch against wall: 2x20 sec hold  Triceps pull downs 20# at Quantum: 3 sets of 10 reps  Lat pull downs 20# at Quantum: 2 sets of 10 reps  90/90 kettle bell carry 5#: 10 feet x3 each direction    Sidelying:   Shoulder ER with 2#: to fatigue    Prone:  Plank on mat [id number]     Manual Therapy: for joint mobilization/manipulations and soft tissue mobilization  Left GH AP joint mobs and inferior glides, grade 2-3  Left shoulder PROM flexion, abduction, ER, and IR     Neuro Re-Education: to improve movement, balance, coordination, kinesthetic sense, posture, and proprioception for sitting or standing balance  None this date     Therapeutic Activities: to improve functional performance, power, or agility  None this date    Activity tolerance and post treatment pain report:   good  Pain Out: 0/10    Education:  Education was provided to the patient on the following topics: continue HEP in pain free ROM; gradual increase in resistance  [x]    No changes were made to the home exercise program.  []    The following changes were made to the home exercise program:   Patient verbalized understanding of the topics presented. ASSESSMENT:   Patient presents with no pain today. Patient able to tolerate increased resistance for all exercises with no increase in pain. Cues for form and rest breaks provided. She demonstrates improving activity tolerance. Patient reports that she has been busy with work and school this week so she was not able to get to the gym. Patient with fairly regular performance of HEP. Patient tolerated manual therapy with improved comfort during end range of motion.      Patients progression toward goals is as follows:  [x]     Improving appropriately and progressing toward goals  []     Improving slowly and progressing toward goals  []     Not making progress toward goals and plan of care will be adjusted    PLAN OF CARE:   Patient continues to benefit from skilled intervention to address the above impairments. [x]    Continue treatment per established plan of care.   []     Recommend the following changes to the plan of care:     Recommendations/Intent for next treatment: progress scapular strengthening    Karina Beckford, PT   Time Calculation: 56 mins  Patient Time in clinic:   Start Time: 1011   Stop Time: 1107

## 2021-08-30 ENCOUNTER — HOSPITAL ENCOUNTER (OUTPATIENT)
Dept: PHYSICAL THERAPY | Age: 26
Discharge: HOME | End: 2021-08-30
Payer: MEDICAID

## 2021-08-30 PROCEDURE — 97110 THERAPEUTIC EXERCISES: CPT | Performed by: PHYSICAL THERAPIST

## 2021-08-30 NOTE — PROGRESS NOTES
[facility]  [address], [address] [facility]    OUTPATIENT PHYSICAL THERAPY DAILY TREATMENT NOTE  VISIT: 7    NAME: Dom Garcia AGE: 32 y.o. GENDER: female  DATE: 8/30/2021  REFERRING PHYSICIAN: Corrie Geiger, DO     GOALS  Short term goals  Time frame: 4 weeks  1. Patient will be compliant and independent with the initial HEP as evidenced by being able to perform without cuing. MET  2. Patient will report a 25% improvement in symptoms. MET  3. Patient report a 25% improvement in sleeping. MET  4. Patient will have a 10 degree increase in left shoulder abduction ROM to allow ease with ADLs. MET  5. Patient will tolerate 20 minutes of clinic activities before increase of symptoms. MET     Long term goals  Time frame: 8 weeks  1. Patient will report pain level decrease to 0/10 to allow increased ease of movement. 2. Patient will have an improved score on the QuickDASH outcome measure by 8 points to demonstrate an increase in functional activity tolerance. 3. Patient will be independent in final individualized HEP. 4. Patient will have an increase in left shoulder abduction ROM to 170 degrees to allow performance of work tasks. 5. Patient will have an increase in left shoulder abduction strength to 5/5 to allow performance of work tasks. 6. Patient will return to lifting at work without being limited by symptoms. 7. Patient will sleep 6-8 hours without being interrupted by pain. SUBJECTIVE:   \"I feel pretty good. \"    Pain In: 0/10 left shoulder    OBJECTIVE DATA SUMMARY:   Objective data from initial evaluation:  EXAMINATION/PRESENTATION/DECISION MAKING:   Pain:  Location: left shoulder  Quality: aching  Now: 5/10  Best: 2/10  Worst: 9/10  Factors that improve pain: medication: used and beneficial     Posture:   Mild rounded shoulders     Strength:                                          LEFT                  RIGHT  Shoulder flexion              4+/5 5/5  Shoulder abduction        4/5                     5/5                       Shoulder ER                   5/5                     5/5  Shoulder IR                    5/5                     5/5  Elbow flexion                  5/5                     5/5  Elbow extension              5/5                     5/5  Wrist flexion                    5/5                     5/5  Wrist extension               5/5                     5/5     Range of Motion:   Cervical spine ROM WNL     Left shoulder AAROM in supine:   Flexion: 158 degrees; limited secondary to pain  Abduction: 130 degrees; limited secondary to pain  ER: 75 degrees; limited secondary to pain  IR: 80 degrees; limited secondary to mild pain     Right shoulder AAROM in supine:   Flexion: 180 degrees  Extension: 180 degrees  ER: 90 degrees  IR: 85 degrees     Joint Mobility:   Left GH joint AP mob mild pain     Palpation:   Tender at anterior shoulder      Neurologic Assessment:               Tone: Normal               Sensation: Intact to light touch               Reflexes: NT     Special Tests:   Discomfort with full can      Mobility:               Transitional Movements: WNL                Gait: WNL     Balance:   WNL     Functional Measure:   QuickDASH: 31.8% impaired     TREATMENT/INTERVENTION:  Modalities (Rationale): None this date     Therapeutic Exercises: to develop strength, endurance, range of motion, and flexibility  Initial HEP provided 7/12/2021: shoulder flexion, abduction, and extension stretches with dowel; shoulder IR stretch with towel; rows with green TB; shoulder ER/IR with green TB; shoulder flexion and scaption to 90 degrees; sidelying shoulder ER      Exercises in BOLD performed this date:      Seated:  Rows 20# Quantum: 3 sets of 10 reps     Standing:   Shoulder IR 10# Quantum: 2 sets of 10 reps  Shoulder ER 10# Quantum: 2 sets of 10 reps  Shoulder flexion to 90 degrees with 3#: 10 reps  Shoulder scaption to 90 degrees 3#: 10 reps  Overhead press 3#: 10 reps  Shoulder ER with over head press 2#: 2 sets of 5 reps  Flys with blue TB: 2 sets of 10 reps   Counter pushups x5, with plus x5  Push ups on yellow physioball: 10 reps  Inverted rows 10# at Quantum: 2 sets of 10 reps  Shoulder IR stretch with towel: 2 reps with 20 sec holds  Shoulder flexion stretch against wall: 2x20 sec hold  Triceps pull downs 25# at Quantum: 3 sets of 10 reps  Lat pull downs 20# at Quantum: 2 sets of 10 reps  90/90 kettle bell carry 5#: 10 feet x3 each direction    Sidelying:   Shoulder ER with 2#: to fatigue    Prone:  Plank on mat [id number]     Manual Therapy: for joint mobilization/manipulations and soft tissue mobilization  Left GH AP joint mobs and inferior glides, grade 2-3  Left shoulder PROM flexion, abduction, ER, and IR     Neuro Re-Education: to improve movement, balance, coordination, kinesthetic sense, posture, and proprioception for sitting or standing balance  None this date     Therapeutic Activities: to improve functional performance, power, or agility  None this date    Activity tolerance and post treatment pain report:   good  Pain Out: 0/10    Education:  Education was provided to the patient on the following topics: continue HEP in pain free ROM; gradual increase in resistance  [x]    No changes were made to the home exercise program.  []    The following changes were made to the home exercise program:   Patient verbalized understanding of the topics presented. ASSESSMENT:   Patient presents with no pain today. Patient is progressing well with strengthening of left shoulder. Patient with full left shoulder ROM. She tolerated added resistance well last session. Patient tolerated clinic exercises well with rest breaks provided as needed. Plan for patient to discharge in two weeks and transition to gym.      Patients progression toward goals is as follows:  [x]     Improving appropriately and progressing toward goals  []     Improving slowly and progressing toward goals  []     Not making progress toward goals and plan of care will be adjusted    PLAN OF CARE:   Patient continues to benefit from skilled intervention to address the above impairments. [x]    Continue treatment per established plan of care.   []     Recommend the following changes to the plan of care:     Recommendations/Intent for next treatment: progress scapular strengthening; Plan for PT discharge 9/15    Hemalatha Yoo PT   Time Calculation: 30 mins  Patient Time in clinic:   Start Time: 1430   Stop Time: 1500

## 2021-09-08 ENCOUNTER — APPOINTMENT (OUTPATIENT)
Dept: PHYSICAL THERAPY | Age: 26
End: 2021-09-08
Payer: MEDICAID

## 2021-09-15 ENCOUNTER — HOSPITAL ENCOUNTER (OUTPATIENT)
Dept: PHYSICAL THERAPY | Age: 26
Discharge: HOME | End: 2021-09-15
Payer: MEDICAID

## 2021-09-15 PROCEDURE — 97110 THERAPEUTIC EXERCISES: CPT | Performed by: PHYSICAL THERAPIST

## 2021-09-15 NOTE — PROGRESS NOTES
[facility]    OUTPATIENT PHYSICAL THERAPY DAILY TREATMENT NOTE WITH DISCHARGE  VISIT: 8  NAME: Shaneka Mcdonnell AGE: 32 y.o. GENDER: female  DATE: 9/15/2021  REFERRING PHYSICIAN: Emily Cruz DO     GOALS  Short term goals  Time frame: 4 weeks  1. Patient will be compliant and independent with the initial HEP as evidenced by being able to perform without cuing. MET  2. Patient will report a 25% improvement in symptoms. MET  3. Patient report a 25% improvement in sleeping. MET  4. Patient will have a 10 degree increase in left shoulder abduction ROM to allow ease with ADLs. MET  5. Patient will tolerate 20 minutes of clinic activities before increase of symptoms. MET     Long term goals  Time frame: 8 weeks  1. Patient will report pain level decrease to 0/10 to allow increased ease of movement. MET  2. Patient will have an improved score on the QuickDASH outcome measure by 8 points to demonstrate an increase in functional activity tolerance. MET  3. Patient will be independent in final individualized HEP. MET  4. Patient will have an increase in left shoulder abduction ROM to 170 degrees to allow performance of work tasks. MET  5. Patient will have an increase in left shoulder abduction strength to 5/5 to allow performance of work tasks. MET  6. Patient will return to lifting at work without being limited by symptoms. MET  7. Patient will sleep 6-8 hours without being interrupted by pain. MET     SUBJECTIVE:   \"I feel pretty good. \"    Pain In: 0/10 left shoulder    OBJECTIVE DATA SUMMARY:   EXAMINATION/PRESENTATION/DECISION MAKING:   Pain:  Location: left shoulder  Quality: aching  Now: 0/10  Best: 0/10  Worst: 2/10  Factors that improve pain: stretches/exercises     Posture:    WNL    Strength:                                          LEFT                  RIGHT  Shoulder flexion              5/5                   5/5  Shoulder abduction        5/5 5/5                       Shoulder ER                   5/5                     5/5  Shoulder IR                    5/5                     5/5  Elbow flexion                  5/5                     5/5  Elbow extension              5/5                     5/5  Wrist flexion                    5/5                     5/5  Wrist extension               5/5                     5/5     Range of Motion:   Cervical spine ROM WNL     Left shoulder AAROM in supine:   Flexion: 180 degrees  Abduction: 175 degrees  ER: 90 degrees  IR: 80 degrees     Joint Mobility:   Left GH joint WNL     Palpation:   No tenderness with palpation of left shoulder     Neurologic Assessment:               Tone: Normal               Sensation: Intact to light touch               Reflexes: NT    Mobility:               Transitional Movements: WNL                Gait: WNL     Balance:   WNL     Functional Measure:   QuickDASH: 31.8% impaired on evaluation  QuickDASH: 9% impaired on discharge     TREATMENT/INTERVENTION:  Modalities (Rationale): None this date     Therapeutic Exercises: to develop strength, endurance, range of motion, and flexibility  Initial HEP provided 7/12/2021: shoulder flexion, abduction, and extension stretches with dowel; shoulder IR stretch with towel; rows with green TB; shoulder ER/IR with green TB; shoulder flexion and scaption to 90 degrees; sidelying shoulder ER      Exercises in BOLD performed this date:      Seated:  Rows at Quantum: 25# 1 sets of 10 reps; 2 sets of 10 reps     Standing:   Shoulder IR 10# Quantum: 2 sets of 10 reps  Shoulder ER 10# Quantum: 2 sets of 10 reps  Shoulder flexion to 90 degrees with 3#: 10 reps  Shoulder scaption to 90 degrees 3#: 10 reps  Overhead press 3#: 10 reps  Shoulder ER with over head press 2#: 2 sets of 5 reps  Flys with blue TB: 2 sets of 10 reps   Push ups on yellow physioball: 10 reps  Inverted rows 10# at Quantum: 1 sets of 10 reps  Shoulder flexion stretch against wall: 2x20 sec hold  Triceps pull downs 25# at Quantum: 3 sets of 10 reps  Lat pull downs 20# at Quantum: 2 sets of 10 reps  90/90 kettle bell carry 5#: 10 feet x3 each direction    Activity tolerance and post treatment pain report:   Good; Pain Out: 0/10    Education:  Education was provided to the patient on the following topics: continue HEP in pain free ROM; gradual increase in resistance  []    No changes were made to the home exercise program.  [x]    The following changes were made to the home exercise program: Reviewed comprehensive HEP and instructed to continue exercises at gym as able; educated on how to gradually increase weight at gym with good understanding verbalized  Patient verbalized understanding of the topics presented. ASSESSMENT:   Patient discharged from physical therapy after 8 visits from 7/12/21 to 9/15/21. Patient presents with no pain today. She reports occasional mild pain with heavier lifting at work. Patient with fairly regular performance of HEP, however, she was unable to get to the gym this week. Patient with full left shoulder ROM and good strength. Patient tolerated clinic exercises well with good form. Significant improvement in QuickDASH outcome measure from 31% impaired on evaluation to 9% impaired at discharge. Reviewed comprehensive HEP and instructed to continue exercises at gym as able. Patient in agreement with discharge at this time. PLAN OF CARE:   Patient will be discharged from physical therapy at this time. Criteria for termination of care:  [x]   Goals Achieved  []   [address]  []   Patient has not returned  [x]   Other: Independent with HEP   Plan for follow up, continuing care, or equipment recommendations:  Instructed to continue exercises and call with questions as needed  Thank you for this referral.    Cristine Sheikh, PT   Time Calculation: 30 mins  Patient Time in clinic:   Start Time: 1300   Stop Time: 1330

## 2022-02-10 ENCOUNTER — TRANSCRIBE ORDER (OUTPATIENT)
Dept: SCHEDULING | Age: 27
End: 2022-02-10

## 2022-02-10 DIAGNOSIS — R10.11 RIGHT UPPER QUADRANT PAIN: Primary | ICD-10-CM

## 2022-02-23 ENCOUNTER — HOSPITAL ENCOUNTER (EMERGENCY)
Age: 27
Discharge: LEFT BEFORE DISCHARGE | End: 2022-02-23
Attending: EMERGENCY MEDICINE

## 2022-02-23 VITALS
DIASTOLIC BLOOD PRESSURE: 84 MMHG | WEIGHT: 210 LBS | OXYGEN SATURATION: 99 % | BODY MASS INDEX: 38.64 KG/M2 | RESPIRATION RATE: 19 BRPM | HEIGHT: 62 IN | HEART RATE: 80 BPM | TEMPERATURE: 98.6 F | SYSTOLIC BLOOD PRESSURE: 127 MMHG

## 2022-03-02 ENCOUNTER — HOSPITAL ENCOUNTER (OUTPATIENT)
Dept: ULTRASOUND IMAGING | Age: 27
Discharge: HOME | End: 2022-03-02
Payer: MEDICAID

## 2022-03-02 DIAGNOSIS — R10.11 RIGHT UPPER QUADRANT PAIN: ICD-10-CM

## 2022-03-02 PROCEDURE — 76700 US EXAM ABDOM COMPLETE: CPT

## 2022-08-18 ENCOUNTER — TRANSCRIBE ORDER (OUTPATIENT)
Dept: SCHEDULING | Age: 27
End: 2022-08-18

## 2022-08-18 DIAGNOSIS — R10.2 PAIN PELVIC: Primary | ICD-10-CM

## 2022-08-19 ENCOUNTER — TRANSCRIBE ORDER (OUTPATIENT)
Dept: SCHEDULING | Age: 27
End: 2022-08-19

## 2022-08-19 DIAGNOSIS — R10.2 PAIN PELVIC: Primary | ICD-10-CM

## 2022-08-25 ENCOUNTER — HOSPITAL ENCOUNTER (OUTPATIENT)
Dept: ULTRASOUND IMAGING | Age: 27
Discharge: HOME | End: 2022-08-25
Payer: MEDICAID

## 2022-08-25 DIAGNOSIS — R10.2 PAIN PELVIC: ICD-10-CM

## 2022-08-25 PROCEDURE — 76856 US EXAM PELVIC COMPLETE: CPT

## 2022-08-25 PROCEDURE — 76830 TRANSVAGINAL US NON-OB: CPT

## 2022-10-06 ENCOUNTER — OFFICE VISIT (OUTPATIENT)
Dept: OBGYN CLINIC | Age: 27
End: 2022-10-06
Payer: MEDICAID

## 2022-10-06 VITALS
DIASTOLIC BLOOD PRESSURE: 80 MMHG | BODY MASS INDEX: 38.35 KG/M2 | HEIGHT: 62 IN | SYSTOLIC BLOOD PRESSURE: 118 MMHG | WEIGHT: 208.4 LBS

## 2022-10-06 DIAGNOSIS — Z01.419 ENCOUNTER FOR ANNUAL ROUTINE GYNECOLOGICAL EXAMINATION: Primary | ICD-10-CM

## 2022-10-06 PROCEDURE — 99385 PREV VISIT NEW AGE 18-39: CPT | Performed by: ADVANCED PRACTICE MIDWIFE

## 2022-10-06 NOTE — PROGRESS NOTES
Leonela Do is a No obstetric history on file. ,  32 y.o. female BLACK/ whose Patient's last menstrual period was 09/25/2022 (exact date). was on 9/25/2022 who presents for her annual checkup. She is having   Chief Complaint   Patient presents with    Gyn Exam     Patient is new to the practice, no Ob hx. Patient states that she was told by a previous provider that she has a cyst on her left ovary. Patient states that she has noticed that her cycle has been irregular and is concerned that it is an effect of the cyst.         Pt has regular cycles every 28 days with an occasional cycle that is different, she had a very long cycle in August and thought she may be pregnant. She took two tests and had a Beta all of which were neg. She had an pelvuic US and was told she may have a cyst, she also had a pelvic US in Feb 2022- this showed a possible polyp in her uterus she denies having heavy or painful cycles    With regard to the Gardisil vaccine, she has not received it yet. Menstrual status:    Her periods are irregular as of lately , minimal to nonexistent in flow. She is using one to two pads or tampons per day, usually irregular lasting 1 to 2 days. She denies dysmenorrhea. She reports no premenstrual symptoms. Contraception:    The current method of family planning is none and She declines contraception and counseling. Sexual history:    She  reports being sexually active and has had partner(s) who are male. She reports using the following method of birth control/protection: Condom. Medical conditions:    Since her last annual GYN exam about one year ago, she has not the following changes in her health history: none. Pap and Mammogram History:    Her most recent Pap smear was done at an outside practice     The patient has never had a mammogram.    The patient does not have a close family hx of breast cancer a couple of cousins on her fathers side.      Past Medical History:   Diagnosis Date    Ill-defined condition     hx dislocated shoulder     History reviewed. No pertinent surgical history. Current Outpatient Medications   Medication Sig Dispense Refill    acyclovir (ZOVIRAX) 400 mg tablet Take 400 mg by mouth two (2) times a day. butalbital-acetaminophen-caffeine (FIORICET, ESGIC) -40 mg per tablet Take 1 Tab by mouth every six (6) hours as needed for Headache. 12 Tab 0     Allergies: Patient has no known allergies. Social History     Socioeconomic History    Marital status: SINGLE     Spouse name: Not on file    Number of children: Not on file    Years of education: Not on file    Highest education level: Not on file   Occupational History    Not on file   Tobacco Use    Smoking status: Never    Smokeless tobacco: Never   Vaping Use    Vaping Use: Never used   Substance and Sexual Activity    Alcohol use: Yes     Comment: occasionally    Drug use: Yes     Types: Marijuana    Sexual activity: Yes     Partners: Male     Birth control/protection: Condom   Other Topics Concern    Not on file   Social History Narrative    Not on file     Social Determinants of Health     Financial Resource Strain: Not on file   Food Insecurity: Not on file   Transportation Needs: Not on file   Physical Activity: Not on file   Stress: Not on file   Social Connections: Not on file   Intimate Partner Violence: Not on file   Housing Stability: Not on file     Tobacco History:  reports that she has never smoked. She has never used smokeless tobacco.  Alcohol Abuse:  reports current alcohol use. Drug Abuse:  reports current drug use. Drug: Marijuana. There is no problem list on file for this patient.       Review of Systems - History obtained from the patient  Constitutional: negative for weight loss, fever, night sweats  HEENT: negative for hearing loss, earache, congestion, snoring, sorethroat  CV: negative for chest pain, palpitations, edema  Resp: negative for cough, shortness of breath, wheezing  GI: negative for change in bowel habits, abdominal pain, black or bloody stools  : negative for frequency, dysuria, hematuria, vaginal discharge  MSK: negative for back pain, joint pain, muscle pain  Breast: negative for breast lumps, nipple discharge, galactorrhea  Skin :negative for itching, rash, hives  Neuro: negative for dizziness, headache, confusion, weakness  Psych: negative for anxiety, depression, change in mood  Heme/lymph: negative for bleeding, bruising, pallor    Physical Exam    Visit Vitals  /80 (BP 1 Location: Left upper arm, BP Patient Position: Sitting, BP Cuff Size: Small adult)   Ht 5' 2\" (1.575 m)   Wt 208 lb 6.4 oz (94.5 kg)   LMP 09/25/2022 (Exact Date)   BMI 38.12 kg/m²       Constitutional  Appearance: well-nourished, well developed, alert, in no acute distress    HENT  Head and Face: appears normal    Neck  Inspection/Palpation: normal appearance    Chest  Respiratory Effort: breathing normal      Cardiovascular  Heart:   Auscultation: regular rate     Breasts  Inspection of Breasts: breasts symmetrical, no skin changes, no discharge present, nipple appearance normal, no skin retraction present-fibrocystic tissue   Palpation of Breasts and Axillae: no masses present on palpation, no breast tenderness  Axillary Lymph Nodes: no lymphadenopathy present    Gastrointestinal  Abdominal Examination: abdomen non-tender to palpation, no masses present  Liver and spleen: no hepatomegaly present, spleen not palpable  Hernias: no hernias identified    Genitourinary  External Genitalia: normal appearance for age, no discharge present, no tenderness present, no inflammatory lesions present, no masses present, no atrophy present  Vagina: normal vaginal vault without central or paravaginal defects, no discharge present, no inflammatory lesions present, no masses present  Bladder: non-tender to palpation  Urethra: appears normal  Cervix: normal   Uterus: normal size, shape and consistency  Adnexa: no adnexal tenderness present, no adnexal masses present  Perineum: perineum within normal limits, no evidence of trauma, no rashes or skin lesions present  Anus: anus within normal limits, no hemorrhoids present  Inguinal Lymph Nodes: no lymphadenopathy present    Skin  General Inspection: no rash, no lesions identified    Neurologic/Psychiatric  Mental Status:  Orientation: grossly oriented to person, place and time  Mood and Affect: mood normal, affect appropriate    . Assessment/Plan:  Routine gynecologic examination  Her current medical status is satisfactory with no evidence of significant gynecologic issues.     Pap collected today with STDs   Follow up US end of October for possible cyst and possible polyp     Counseled re: diet, exercise, healthy lifestyle  Return for yearly wellness visits      Marlon Rojas CNM

## 2022-10-11 LAB
C TRACH RRNA CVX QL NAA+PROBE: NEGATIVE
CYTOLOGIST CVX/VAG CYTO: NORMAL
CYTOLOGY CVX/VAG DOC CYTO: NORMAL
CYTOLOGY CVX/VAG DOC THIN PREP: NORMAL
DX ICD CODE: NORMAL
HPV I/H RISK 4 DNA CVX QL PROBE+SIG AMP: NEGATIVE
Lab: NORMAL
N GONORRHOEA RRNA CVX QL NAA+PROBE: NEGATIVE
OTHER STN SPEC: NORMAL
STAT OF ADQ CVX/VAG CYTO-IMP: NORMAL
T VAGINALIS RRNA SPEC QL NAA+PROBE: NEGATIVE

## 2022-10-22 NOTE — PROGRESS NOTES
Hollis Encarnacion,       Good news, your pap is normal and negative for HPV, your STD screen is negative! Please let us know if you have any other questions or concerns.      Becka

## 2022-11-02 ENCOUNTER — OFFICE VISIT (OUTPATIENT)
Dept: OBGYN CLINIC | Age: 27
End: 2022-11-02

## 2022-11-02 VITALS — BODY MASS INDEX: 39.14 KG/M2 | SYSTOLIC BLOOD PRESSURE: 116 MMHG | DIASTOLIC BLOOD PRESSURE: 78 MMHG | WEIGHT: 214 LBS

## 2022-11-02 DIAGNOSIS — N94.6 DYSMENORRHEA: ICD-10-CM

## 2022-11-02 DIAGNOSIS — R10.2 PELVIC PAIN: Primary | ICD-10-CM

## 2022-11-02 PROCEDURE — 99213 OFFICE O/P EST LOW 20 MIN: CPT | Performed by: ADVANCED PRACTICE MIDWIFE

## 2022-11-02 RX ORDER — ACYCLOVIR 400 MG/1
400 TABLET ORAL 2 TIMES DAILY
Qty: 60 TABLET | Refills: 2 | Status: SHIPPED | OUTPATIENT
Start: 2022-11-02

## 2022-11-02 NOTE — PROGRESS NOTES
Edwin Payne is a 32 y.o. female who presents for ultrasound and follow up of pelvic pain and ovarian cyst previously seen on imaging. She says pelvic pain post cycle is improving -    Her current method of family planning is none. The patient is sexually active. TV ULTRASOUND 11/2/22  THE UTERUS IS ANTEFLEXED, NORMAL IN SIZE, AND ECHOGENICITY. THE ENDOMETRIUM MEASURES 10.09 MM IN THICKNESS. NO MASSES OR ABNORMALITIES ARE SEEN. RIGHT OVARY APPEARS WNL. LEFT OVARY APPEARS WNL. NO FREE FLUID IS SEEN IN THE CDS. Her relevant past medical history:   Past Medical History:   Diagnosis Date    Ill-defined condition     hx dislocated shoulder        History reviewed. No pertinent surgical history. Social History     Occupational History    Not on file   Tobacco Use    Smoking status: Never    Smokeless tobacco: Never   Vaping Use    Vaping Use: Never used   Substance and Sexual Activity    Alcohol use: Yes     Comment: occasionally    Drug use: Yes     Types: Marijuana    Sexual activity: Yes     Partners: Male     Birth control/protection: None     Family History   Problem Relation Age of Onset    Diabetes Mother     Hypertension Father        No Known Allergies  Prior to Admission medications    Medication Sig Start Date End Date Taking? Authorizing Provider   acyclovir (ZOVIRAX) 400 mg tablet Take 400 mg by mouth two (2) times a day. Patient not taking: Reported on 11/2/2022    Other, MD Kilo   butalbital-acetaminophen-caffeine (FIORICET, ESGIC) -40 mg per tablet Take 1 Tab by mouth every six (6) hours as needed for Headache.   Patient not taking: Reported on 11/2/2022 10/14/20   Morenita Salmeron PA-C        Review of Systems - History obtained from the patient  Constitutional: negative for weight loss, fever, night sweats  HEENT: negative for hearing loss, earache, congestion, snoring, sorethroat  CV: negative for chest pain, palpitations, edema  Resp: negative for cough, shortness of breath, wheezing  Breast: negative for breast lumps, nipple discharge, galactorrhea  GI: negative for change in bowel habits, abdominal pain, black or bloody stools  : negative for frequency, dysuria, hematuria  MSK: negative for back pain, joint pain, muscle pain  Skin: negative for itching, rash, hives  Neuro: negative for dizziness, headache, confusion, weakness  Psych: negative for anxiety, depression, change in mood  Heme/lymph: negative for bleeding, bruising, pallor      Objective:  Visit Vitals  /78   Wt 214 lb (97.1 kg)   LMP 10/22/2022   BMI 39.14 kg/m²          PHYSICAL EXAMINATION    Constitutional  Appearance: well-nourished, well developed, alert, in no acute distress    HENT  Head and Face: appears normal    Neck  Inspection/Palpation: normal appearance      Chest  Respiratory Effort: non-labored breathing    Cardiovascular  Heart: Auscultation: regular rate   Extremities: no peripheral edema    Skin  General Inspection: no rash, no lesions identified    Neurologic/Psychiatric  Mental Status:  Orientation: grossly oriented to person, place and time  Mood and Affect: mood normal, affect appropriate    Assessment:   Dysmenorrhea  Pelvic pain after cycles- likely this is normal for her- without thicker lining or heavier cycles I think it less likely there is a polyp as was questionable on previous scan. Pt is comfortable with this for now. Discussed using birth control for dysmenorrhea, she wants to try conservative management with OTCs at this time    Normal pelvic US- reviewed tech report with pt     Plan:     Patient declines presence of chaperone during today's visit.    RTO for AE or PRN worsening symptoms    Taryn Joshua CNM

## 2022-11-13 ENCOUNTER — HOSPITAL ENCOUNTER (EMERGENCY)
Age: 27
Discharge: HOME | End: 2022-11-13
Attending: EMERGENCY MEDICINE
Payer: MEDICAID

## 2022-11-13 VITALS
HEIGHT: 62 IN | TEMPERATURE: 98.3 F | OXYGEN SATURATION: 100 % | RESPIRATION RATE: 19 BRPM | DIASTOLIC BLOOD PRESSURE: 81 MMHG | WEIGHT: 205 LBS | SYSTOLIC BLOOD PRESSURE: 125 MMHG | HEART RATE: 82 BPM | BODY MASS INDEX: 37.73 KG/M2

## 2022-11-13 DIAGNOSIS — R10.2 ACUTE SUPRAPUBIC PAIN: ICD-10-CM

## 2022-11-13 DIAGNOSIS — N39.0 URINARY TRACT INFECTION WITHOUT HEMATURIA, SITE UNSPECIFIED: Primary | ICD-10-CM

## 2022-11-13 DIAGNOSIS — N76.0 VAGINITIS AND VULVOVAGINITIS: ICD-10-CM

## 2022-11-13 DIAGNOSIS — Z20.2 EXPOSURE TO SEXUALLY TRANSMITTED DISEASE (STD): ICD-10-CM

## 2022-11-13 LAB
APPEARANCE UR: CLEAR
BACTERIA URNS QL MICRO: ABNORMAL /HPF
BILIRUB UR QL: NEGATIVE
CLUE CELLS VAG QL WET PREP: NORMAL
COLOR UR: ABNORMAL
EPITH CASTS URNS QL MICRO: ABNORMAL /LPF
GLUCOSE UR STRIP.AUTO-MCNC: NEGATIVE MG/DL
HCG UR QL: NEGATIVE
HGB UR QL STRIP: NEGATIVE
KETONES UR QL STRIP.AUTO: NEGATIVE MG/DL
KOH PREP SPEC: NORMAL
LEUKOCYTE ESTERASE UR QL STRIP.AUTO: ABNORMAL
NITRITE UR QL STRIP.AUTO: NEGATIVE
PH UR STRIP: 5.5 [PH] (ref 5–8)
PROT UR STRIP-MCNC: NEGATIVE MG/DL
RBC #/AREA URNS HPF: ABNORMAL /HPF (ref 0–5)
SERVICE CMNT-IMP: NORMAL
SP GR UR REFRACTOMETRY: 1.01
T VAGINALIS VAG QL WET PREP: NORMAL
UA: UC IF INDICATED,UAUC: ABNORMAL
UROBILINOGEN UR QL STRIP.AUTO: 1 EU/DL (ref 0.2–1)
WBC URNS QL MICRO: ABNORMAL /HPF (ref 0–4)

## 2022-11-13 PROCEDURE — 87491 CHLMYD TRACH DNA AMP PROBE: CPT

## 2022-11-13 PROCEDURE — 81001 URINALYSIS AUTO W/SCOPE: CPT

## 2022-11-13 PROCEDURE — 87077 CULTURE AEROBIC IDENTIFY: CPT

## 2022-11-13 PROCEDURE — 87086 URINE CULTURE/COLONY COUNT: CPT

## 2022-11-13 PROCEDURE — 99283 EMERGENCY DEPT VISIT LOW MDM: CPT

## 2022-11-13 PROCEDURE — 87210 SMEAR WET MOUNT SALINE/INK: CPT

## 2022-11-13 PROCEDURE — 81025 URINE PREGNANCY TEST: CPT

## 2022-11-13 PROCEDURE — 87186 SC STD MICRODIL/AGAR DIL: CPT

## 2022-11-13 PROCEDURE — 96372 THER/PROPH/DIAG INJ SC/IM: CPT

## 2022-11-13 PROCEDURE — 74011250637 HC RX REV CODE- 250/637: Performed by: EMERGENCY MEDICINE

## 2022-11-13 RX ORDER — AZITHROMYCIN 500 MG/1
1000 TABLET, FILM COATED ORAL
Status: COMPLETED | OUTPATIENT
Start: 2022-11-13 | End: 2022-11-13

## 2022-11-13 RX ORDER — CEPHALEXIN 500 MG/1
500 CAPSULE ORAL
Status: COMPLETED | OUTPATIENT
Start: 2022-11-13 | End: 2022-11-13

## 2022-11-13 RX ORDER — NAPROXEN 250 MG/1
500 TABLET ORAL ONCE
Status: COMPLETED | OUTPATIENT
Start: 2022-11-13 | End: 2022-11-13

## 2022-11-13 RX ORDER — CEPHALEXIN 500 MG/1
500 CAPSULE ORAL 4 TIMES DAILY
Qty: 28 CAPSULE | Refills: 0 | Status: SHIPPED | OUTPATIENT
Start: 2022-11-13 | End: 2022-11-20

## 2022-11-13 RX ORDER — NAPROXEN 500 MG/1
500 TABLET ORAL 2 TIMES DAILY WITH MEALS
Qty: 20 TABLET | Refills: 0 | Status: SHIPPED | OUTPATIENT
Start: 2022-11-13

## 2022-11-13 RX ADMIN — NAPROXEN 500 MG: 250 TABLET ORAL at 19:58

## 2022-11-13 RX ADMIN — CEPHALEXIN 500 MG: 500 CAPSULE ORAL at 20:03

## 2022-11-13 RX ADMIN — AZITHROMYCIN MONOHYDRATE 1000 MG: 500 TABLET ORAL at 20:01

## 2022-11-14 LAB
C TRACH DNA SPEC QL NAA+PROBE: NEGATIVE
N GONORRHOEA DNA SPEC QL NAA+PROBE: NEGATIVE
SAMPLE TYPE: NORMAL
SERVICE CMNT-IMP: NORMAL
SPECIMEN SOURCE: NORMAL

## 2022-11-14 NOTE — ED NOTES
Discharge instructions were given to the patient by Anibal Reed RN. The patient left the Emergency Department ambulatory, alert and oriented and in no acute distress with 3 prescriptions. The patient was encouraged to call or return to the ED for worsening issues or problems and was encouraged to schedule a follow up appointment for continuing care. The patient verbalized understanding of discharge instructions and prescriptions, all questions were answered. The patient has no further concerns at this time.

## 2022-11-14 NOTE — ED NOTES
Pt presents to ED ambulatory complaining of dysuria x yesterday. Pt denies vaginal discharge. Pt also complaining of abdominal cramping after using a \"crunch machine\" at the gym. Pt denies N/V/D. Pt is alert and oriented x 4, RR even and unlabored, skin is warm and dry. Assessment completed and pt updated on plan of care. Call bell in reach. Emergency Department Nursing Plan of Care       The Nursing Plan of Care is developed from the Nursing assessment and Emergency Department Attending provider initial evaluation. The plan of care may be reviewed in the ED Provider note.     The Plan of Care was developed with the following considerations:   Patient / Family readiness to learn indicated by:verbalized understanding  Persons(s) to be included in education: patient  Barriers to Learning/Limitations:No    Signed     Gwendolyn Garzon RN    11/13/2022   7:15 PM

## 2022-11-14 NOTE — DISCHARGE INSTRUCTIONS
Thank You! It was a pleasure taking care of you in our Emergency Department today. We know that when you come to [address] [place] [address], you are entrusting us with your health, comfort, and safety. Our physicians and nurses honor that trust, and truly appreciate the opportunity to care for you and your loved ones. We also value your feedback. If you receive a survey about your Emergency Department experience today, please fill it out. We care about our patients' feedback, and we listen to what you have to say. Thank you. Dr. Hannah Brown M.D.      ____________________________________________________________________  I have included a copy of your lab results and/or radiologic studies from today's visit so you can have them easily available at your follow-up visit. We hope you feel better and please do not hesitate to contact the ED if you have any questions at all! Recent Results (from the past 12 hour(s))   URINALYSIS W/ REFLEX CULTURE    Collection Time: 11/13/22  7:42 PM    Specimen: Urine   Result Value Ref Range    Color YELLOW/STRAW      Appearance CLEAR CLEAR      Specific gravity 1.015      pH (UA) 5.5 5.0 - 8.0      Protein Negative NEG mg/dL    Glucose Negative NEG mg/dL    Ketone Negative NEG mg/dL    Bilirubin Negative NEG      Blood Negative NEG      Urobilinogen 1.0 0.2 - 1.0 EU/dL    Nitrites Negative NEG      Leukocyte Esterase MODERATE (A) NEG      WBC 10-20 0 - 4 /hpf    RBC 0-5 0 - 5 /hpf    Epithelial cells FEW FEW /lpf    Bacteria 3+ (A) NEG /hpf    UA:UC IF INDICATED URINE CULTURE ORDERED (A) CNI     WET PREP    Collection Time: 11/13/22  7:42 PM    Specimen: Miscellaneous sample   Result Value Ref Range    Clue cells CLUE CELLS ABSENT      Wet prep NO TRICHOMONAS SEEN     KOH, OTHER SOURCES    Collection Time: 11/13/22  7:42 PM    Specimen: Vagina;  Other   Result Value Ref Range    Special Requests: NO SPECIAL REQUESTS      KOH NO YEAST SEEN         No orders to display     CT Results  (Last 48 hours)      None          The exam and treatment you received in the Emergency Department were for an urgent problem and are not intended as complete care. It is important that you follow up with a doctor, nurse practitioner, or physician assistant for ongoing care. If your symptoms become worse or you do not improve as expected and you are unable to reach your usual health care provider, you should return to the Emergency Department. We are available 24 hours a day. Please take your discharge instructions with you when you go to your follow-up appointment. If a prescription has been provided, please have it filled as soon as possible to prevent a delay in treatment. Read the entire medication instruction sheet provided to you by the pharmacy. If you have any questions or reservations about taking the medication due to side effects or interactions with other medications, please call your primary care physician or contact the ER to speak with the charge nurse. Please make an appointment with your family doctor or the physician you were referred to for follow-up of this visit as instructed on your discharge paperwork. Return to the ER if you are unable to be seen or if you are unable to be seen in a timely manner. If you have any problem arranging the follow-up visit, contact the Emergency Department immediately.

## 2022-11-14 NOTE — ED PROVIDER NOTES
EMERGENCY DEPARTMENT HISTORY AND PHYSICAL EXAM               Please note that this dictation was completed with the assistance of \"Dragon\", the computer voice recognition software. Quite often unanticipated grammatical, syntax, homophones, and other interpretive errors are inadvertently transcribed by the computer software. Please disregard these errors and any errors that have escaped final proofreading. Thank you. Date: 11/13/22  Patient: Ayden Parsons  Patient Age and Sex: 32 y.o. female   MRN: [id number]  CSN: [id number]    History of Presenting Illness     Chief Complaint   Patient presents with    Dysuria     Per pt reports dysuria and vaginal itching that started this morning, denies vaginal discharge. Abdominal Pain     Per pt reports lower abdominal pain after a work out yesterday, denies N/V/D. History Provided By: Patient/family/EMS (if applicable)    HPI: Ayden Parsons, 32 y.o. female with past medical history as documented below presents to the ED with c/o of lower abdominal discomfort as well as dysuria and vaginal itching. Patient reports yesterday she was at the gym and noticed having mild to moderate generalized abdominal cramping. Patient initially thought that she pulled a muscle. Symptoms lasted for about 5 minutes. Patient also notes having onset of mild dysuria and vaginal itching that started this morning. Patient expresses concern for possible STI and is desiring empiric treatment and testing. Denies any concern for pregnancy. Denies any vaginal bleeding. Patient is taking no medications yet for symptoms. Pt denies any other exacerbating or ameliorating factors. Pt specifically denies any recent fever, chills, headache, nausea, vomiting, CP, SOB, lightheadedness, dizziness, numbness, weakness, lower extremity swelling, heart palpitations, diarrhea, constipation, melena, hematochezia, cough, or congestion.      There are no other complaints, changes or physical findings pertinent to the HPI at this time. PCP: None  Past History   Past Medical History:  Past Medical History:   Diagnosis Date    Ill-defined condition     hx dislocated shoulder       Past Surgical History:  Denies    Family History:   Family history reviewed and was non-contributory, unless specified below:  Family History   Problem Relation Age of Onset    Diabetes Mother     Hypertension Father        Social History:  Social History     Tobacco Use    Smoking status: Never    Smokeless tobacco: Never   Vaping Use    Vaping Use: Never used   Substance Use Topics    Alcohol use: Yes     Comment: occasionally    Drug use: Yes     Types: Marijuana       Allergies:  No Known Allergies    Current Medications:  No current facility-administered medications on file prior to encounter. Current Outpatient Medications on File Prior to Encounter   Medication Sig Dispense Refill    acyclovir (ZOVIRAX) 400 mg tablet Take 1 Tablet by mouth two (2) times a day. 60 Tablet 2    butalbital-acetaminophen-caffeine (FIORICET, ESGIC) -40 mg per tablet Take 1 Tab by mouth every six (6) hours as needed for Headache. (Patient not taking: No sig reported) 12 Tab 0     Review of Systems   A complete ROS was reviewed by me today and all other systems negative, unless otherwise specified below:  Review of Systems   Constitutional: Negative. Negative for chills and fever. HENT: Negative. Negative for congestion and sore throat. Eyes: Negative. Respiratory: Negative. Negative for cough, chest tightness, shortness of breath and wheezing. Cardiovascular: Negative. Negative for chest pain, palpitations and leg swelling. Gastrointestinal:  Positive for abdominal pain. Negative for abdominal distention, blood in stool, constipation, diarrhea, nausea and vomiting. Endocrine: Negative. Genitourinary:  Positive for dysuria. Negative for difficulty urinating, flank pain, frequency, hematuria and urgency. Musculoskeletal: Negative. Negative for back pain and neck stiffness. Skin: Negative. Negative for rash. Allergic/Immunologic: Negative. Neurological: Negative. Negative for dizziness, syncope, weakness, light-headedness, numbness and headaches. Hematological: Negative. Psychiatric/Behavioral: Negative. Negative for confusion and self-injury. Physical Exam   Physical Exam  Vitals and nursing note reviewed. Constitutional:       General: She is not in acute distress. Appearance: She is well-developed. She is not diaphoretic. HENT:      Head: Normocephalic and atraumatic. Mouth/Throat:      Pharynx: No oropharyngeal exudate. Eyes:      Conjunctiva/sclera: Conjunctivae normal.      Pupils: Pupils are equal, round, and reactive to light. Cardiovascular:      Rate and Rhythm: Normal rate and regular rhythm. Heart sounds: Normal heart sounds. No murmur heard. No friction rub. No gallop. Pulmonary:      Effort: Pulmonary effort is normal. No respiratory distress. Breath sounds: Normal breath sounds. No wheezing or rales. Chest:      Chest wall: No tenderness. Abdominal:      General: Bowel sounds are normal. There is no distension. Palpations: Abdomen is soft. There is no mass. Tenderness: There is no abdominal tenderness. There is no guarding or rebound. Musculoskeletal:         General: No tenderness or deformity. Normal range of motion. Cervical back: Normal range of motion. Skin:     General: Skin is warm. Findings: No rash. Neurological:      Mental Status: She is alert and oriented to person, place, and time. Cranial Nerves: No cranial nerve deficit. Motor: No abnormal muscle tone. Coordination: Coordination normal.      Deep Tendon Reflexes: Reflexes normal.     Diagnostic Study Results     Laboratory Data  I have personally reviewed and interpreted all available laboratory results.    Recent Results (from the past 24 hour(s))   URINALYSIS W/ REFLEX CULTURE    Collection Time: 11/13/22  7:42 PM    Specimen: Urine   Result Value Ref Range    Color YELLOW/STRAW      Appearance CLEAR CLEAR      Specific gravity 1.015      pH (UA) 5.5 5.0 - 8.0      Protein Negative NEG mg/dL    Glucose Negative NEG mg/dL    Ketone Negative NEG mg/dL    Bilirubin Negative NEG      Blood Negative NEG      Urobilinogen 1.0 0.2 - 1.0 EU/dL    Nitrites Negative NEG      Leukocyte Esterase MODERATE (A) NEG      WBC 10-20 0 - 4 /hpf    RBC 0-5 0 - 5 /hpf    Epithelial cells FEW FEW /lpf    Bacteria 3+ (A) NEG /hpf    UA:UC IF INDICATED URINE CULTURE ORDERED (A) CNI     WET PREP    Collection Time: 11/13/22  7:42 PM    Specimen: Miscellaneous sample   Result Value Ref Range    Clue cells CLUE CELLS ABSENT      Wet prep NO TRICHOMONAS SEEN     KOH, OTHER SOURCES    Collection Time: 11/13/22  7:42 PM    Specimen: Vagina; Other   Result Value Ref Range    Special Requests: NO SPECIAL REQUESTS      KOH NO YEAST SEEN     HCG URINE, QL. - POC    Collection Time: 11/13/22  7:59 PM   Result Value Ref Range    Pregnancy test,urine (POC) Negative NEG         Radiologic Studies   I have personally reviewed and interpreted all available imaging studies and agree with radiology interpretation. No orders to display     CT Results  (Last 48 hours)      None          CXR Results  (Last 48 hours)      None          Medical Decision Making   I am the first and primary ED physician for this patient's ED visit today. I reviewed our EMR for any past records that may contribute to the patient's current condition, including their past medical, surgical, social and family history. This also includes their most recent ED visits, previous hospitalizations and prior diagnostic data. I have reviewed and summarized the most pertinent findings in my HPI and MDM.     Vital Signs Reviewed:  Patient Vitals for the past 24 hrs:   Temp Pulse Resp BP SpO2   11/13/22 1811 98.3 °F (36.8 °C) 82 19 125/81 100 %     Pulse Oximetry Analysis: 100% on RA    Cardiac Monitor:   Rate: 82 bpm  The cardiac monitor revealed the following rhythm as interpreted by me: Normal Sinus Rhythm  Cardiac monitoring was ordered to monitor patient for signs of cardiac dysrhythmia, which they are at risk for based on their history and/or risk for cardiovascular disease and/or metabolic abnormalities. Records Reviewed: Nursing Notes, Old Medical Records, Previous electrocardiograms, Previous Radiology Studies and Previous Laboratory Studies, EMS reports    Provider Notes (Medical Decision Making):   Patient presents with vaginal discharge and urinary sx's; afebrile, stable vitals with benign exam; DDx: pregnancy, acute cystitis, ureteral stone, pyelonephritis, STI, BV, yeast infection, Will obtain urinalysis, urine pregnancy, send pelvic labs. If patient expresses concern for STI exposure, will test and empirically treat. Also, I provided education on the importance of testing and treating partners and using safe sex practices in the future. Discussed with the patient diagnosis and test results and all questioned fully answered. They understand the importance of staying well hydrated, taking antibiotics as prescribed to completion. She will follow-up with PCP and or their OB/GYN if any problems arise. ED Course:   Initial assessment performed. I discussed presenting problems and concerns, and my formulated plan for today's visit with the patient and any available family members. I have encouraged them to ask questions as they arise throughout the visit.    Social History     Tobacco Use    Smoking status: Never    Smokeless tobacco: Never   Vaping Use    Vaping Use: Never used   Substance Use Topics    Alcohol use: Yes     Comment: occasionally    Drug use: Yes     Types: Marijuana       ED Orders Placed:  Orders Placed This Encounter    WET PREP    CHLAMYDIA/GC PCR, SWAB    KOH, OTHER SOURCES    CULTURE, URINE URINALYSIS W/ REFLEX CULTURE    POC URINE PREGNANCY TEST    HCG URINE, QL. - POC    naproxen (NAPROSYN) tablet 500 mg    cefTRIAXone (ROCEPHIN) 500 mg in lidocaine (PF) (XYLOCAINE) 10 mg/mL (1 %) IM injection    azithromycin (ZITHROMAX) tablet 1,000 mg    cephALEXin (KEFLEX) capsule 500 mg    cephALEXin (Keflex) 500 mg capsule    naproxen (NAPROSYN) 500 mg tablet       ED Medications Administered During ED Course:  Medications   cefTRIAXone (ROCEPHIN) 500 mg in lidocaine (PF) (XYLOCAINE) 10 mg/mL (1 %) IM injection (has no administration in time range)   naproxen (NAPROSYN) tablet 500 mg (500 mg Oral Given 11/13/22 1958)   azithromycin (ZITHROMAX) tablet 1,000 mg (1,000 mg Oral Given 11/13/22 2001)   cephALEXin (KEFLEX) capsule 500 mg (500 mg Oral Given 11/13/22 2003)        Amount and/or Complexity of Data Reviewed  Clinical lab tests: ordered as appropriate & reviewed  Tests in the radiology section of CPT®: ordered as appropriate & reviewed  Tests in the medicine section of CPT®: ordered as appropriate & reviewed  Obtain history from someone other than the patient: yes  Review and summarize past medical records: yes  Independent visualization of images, tracings, or specimens: yes     Progress Note:  I have just re-evaluated the patient. Patient reports improvement of symptoms. I have reviewed her vital signs and determined there is currently no worsening in their condition or physical exam. Results have been reviewed with them and their questions have been answered. I will continue to review further results as they come available. Progress Note:  I have re-examined the patient. Pt states she feels much better and symptoms improved. Tolerating oral intake. Abdomen is soft and without guarding, rebound or other peritoneal signs. I have discussed with patient the importance of close f/u and to return to the ED if symptoms don't improve or worsen.     Patient Reassessment:  Pt reassessed and symptoms noted to have improved significantly after ED treatment. [facility] labs and imaging have been reviewed with her and available family. She verbally conveys understanding and agreement of the signs, symptoms, diagnosis, treatment and prognosis and additionally agrees to follow up as recommended with Dr. None and/or specialist as instructed. She agrees with the care plan we have created and conveys that all of her questions have been answered. Additionally, I have put together a packet of discharge instructions for her that include: 1) Educational information regarding their diagnosis, 2) How to care for their diagnosis at home, as well a 3) List of reasons why they would want to return to the ED prior to their follow-up appointment should their condition change or symptoms worsen. Disposition:  DISCHARGE  The pt is ready for discharge. The pt's signs, symptoms, diagnosis, and discharge instructions have been discussed and pt has conveyed their understanding. The pt is to follow up as recommended or return to ER should their symptoms worsen. Plan has been discussed and pt is in agreement. I have answered all questions to the patient's satisfaction. Strict return precautions given. She and/or family conveyed understanding and agreement with care plan. Vital signs stable for discharge. Plan:  1. Return precautions as discussed with patient and available family/caregiver. 2.   Current Discharge Medication List        START taking these medications    Details   cephALEXin (Keflex) 500 mg capsule Take 1 Capsule by mouth four (4) times daily for 7 days. Qty: 28 Capsule, Refills: 0  Start date: 11/13/2022, End date: 11/20/2022      naproxen (NAPROSYN) 500 mg tablet Take 1 Tablet by mouth two (2) times daily (with meals). Qty: 20 Tablet, Refills: 0  Start date: 11/13/2022           3.    Follow-up Information       Follow up With Specialties Details Why [address] [facility] EMERGENCY DEPT Emergency Medicine  As needed, If symptoms worsen [address] [facility]  [phone]          Instructed to return to ED if worse  Diagnosis   Clinical Impression:  1. Urinary tract infection without hematuria, site unspecified    2. Vaginitis and vulvovaginitis    3. Exposure to sexually transmitted disease (STD)    4. Acute suprapubic pain      Attestation:  Solomon Pavon MD, am the attending of record for this patient. I personally performed the services described in this documentation on this date, 11/13/2022 for patient, Ketan Edwards. I have reviewed the chart and verified that the record is accurate and complete.

## 2022-11-16 LAB
BACTERIA SPEC CULT: ABNORMAL
CC UR VC: ABNORMAL
SERVICE CMNT-IMP: ABNORMAL

## 2022-12-12 ENCOUNTER — HOSPITAL ENCOUNTER (EMERGENCY)
Age: 27
Discharge: HOME | End: 2022-12-12
Attending: EMERGENCY MEDICINE
Payer: MEDICAID

## 2022-12-12 VITALS
WEIGHT: 205 LBS | BODY MASS INDEX: 37.73 KG/M2 | OXYGEN SATURATION: 100 % | HEIGHT: 62 IN | SYSTOLIC BLOOD PRESSURE: 131 MMHG | DIASTOLIC BLOOD PRESSURE: 96 MMHG | HEART RATE: 78 BPM | RESPIRATION RATE: 16 BRPM | TEMPERATURE: 99.3 F

## 2022-12-12 DIAGNOSIS — R10.812 LEFT UPPER QUADRANT ABDOMINAL TENDERNESS WITHOUT REBOUND TENDERNESS: Primary | ICD-10-CM

## 2022-12-12 LAB
ALBUMIN SERPL-MCNC: 3.5 G/DL (ref 3.5–5)
ALBUMIN/GLOB SERPL: 1 {RATIO} (ref 1.1–2.2)
ALP SERPL-CCNC: 45 U/L (ref 45–117)
ALT SERPL-CCNC: 20 U/L (ref 12–78)
ANION GAP SERPL CALC-SCNC: 7 MMOL/L (ref 5–15)
AST SERPL-CCNC: 19 U/L (ref 15–37)
BASOPHILS # BLD: 0 K/UL (ref 0–0.1)
BASOPHILS NFR BLD: 0 % (ref 0–1)
BILIRUB SERPL-MCNC: 0.2 MG/DL (ref 0.2–1)
BUN SERPL-MCNC: 15 MG/DL (ref 6–20)
BUN/CREAT SERPL: 16 (ref 12–20)
CALCIUM SERPL-MCNC: 8.8 MG/DL (ref 8.5–10.1)
CHLORIDE SERPL-SCNC: 107 MMOL/L (ref 97–108)
CO2 SERPL-SCNC: 26 MMOL/L (ref 21–32)
CREAT SERPL-MCNC: 0.92 MG/DL (ref 0.55–1.02)
DIFFERENTIAL METHOD BLD: ABNORMAL
EOSINOPHIL # BLD: 0.2 K/UL (ref 0–0.4)
EOSINOPHIL NFR BLD: 2 % (ref 0–7)
ERYTHROCYTE [DISTWIDTH] IN BLOOD BY AUTOMATED COUNT: 12.3 % (ref 11.5–14.5)
GLOBULIN SER CALC-MCNC: 3.6 G/DL (ref 2–4)
GLUCOSE SERPL-MCNC: 97 MG/DL (ref 65–100)
HCT VFR BLD AUTO: 37.7 % (ref 35–47)
HGB BLD-MCNC: 12.4 G/DL (ref 11.5–16)
IMM GRANULOCYTES # BLD AUTO: 0 K/UL (ref 0–0.04)
IMM GRANULOCYTES NFR BLD AUTO: 0 % (ref 0–0.5)
LIPASE SERPL-CCNC: 153 U/L (ref 73–393)
LYMPHOCYTES # BLD: 3.1 K/UL (ref 0.8–3.5)
LYMPHOCYTES NFR BLD: 38 % (ref 12–49)
MCH RBC QN AUTO: 30.9 PG (ref 26–34)
MCHC RBC AUTO-ENTMCNC: 32.9 G/DL (ref 30–36.5)
MCV RBC AUTO: 94 FL (ref 80–99)
MONOCYTES # BLD: 0.7 K/UL (ref 0–1)
MONOCYTES NFR BLD: 9 % (ref 5–13)
NEUTS SEG # BLD: 4.1 K/UL (ref 1.8–8)
NEUTS SEG NFR BLD: 51 % (ref 32–75)
NRBC # BLD: 0 K/UL (ref 0–0.01)
NRBC BLD-RTO: 0 PER 100 WBC
PLATELET # BLD AUTO: 372 K/UL (ref 150–400)
PMV BLD AUTO: 8.6 FL (ref 8.9–12.9)
POTASSIUM SERPL-SCNC: 4.5 MMOL/L (ref 3.5–5.1)
PROT SERPL-MCNC: 7.1 G/DL (ref 6.4–8.2)
RBC # BLD AUTO: 4.01 M/UL (ref 3.8–5.2)
SODIUM SERPL-SCNC: 140 MMOL/L (ref 136–145)
TROPONIN-HIGH SENSITIVITY: 7 NG/L (ref 0–51)
WBC # BLD AUTO: 8.1 K/UL (ref 3.6–11)

## 2022-12-12 PROCEDURE — 85025 COMPLETE CBC W/AUTO DIFF WBC: CPT

## 2022-12-12 PROCEDURE — 99284 EMERGENCY DEPT VISIT MOD MDM: CPT

## 2022-12-12 PROCEDURE — 80053 COMPREHEN METABOLIC PANEL: CPT

## 2022-12-12 PROCEDURE — 84484 ASSAY OF TROPONIN QUANT: CPT

## 2022-12-12 PROCEDURE — 93005 ELECTROCARDIOGRAM TRACING: CPT

## 2022-12-12 PROCEDURE — 83690 ASSAY OF LIPASE: CPT

## 2022-12-12 PROCEDURE — 36415 COLL VENOUS BLD VENIPUNCTURE: CPT

## 2022-12-12 RX ORDER — PANTOPRAZOLE SODIUM 40 MG/1
40 TABLET, DELAYED RELEASE ORAL DAILY
Qty: 20 TABLET | Refills: 0 | Status: SHIPPED | OUTPATIENT
Start: 2022-12-12 | End: 2023-01-01

## 2022-12-12 NOTE — ED NOTES
Pt presents ambulatory to ED complaining of upper left abd pain and left flank/back pain for the past week. Pt is also reporting a thick white vaginal discharge with a bad odor. Pt reports she recently had a UTI and was taking antibiotics for it, which caused her to get a yeast infection. Pt reports getting medication for the yeast infection but reports that her symptoms have not gone away. Pt reports she went to the doctors office and tested positive for BV the other day but has not been treated. Pt denies N/V and pain with urination. Pt is alert and oriented x 4, RR even and unlabored, skin is warm and dry. Assesment completed and pt updated on plan of care. Emergency Department Nursing Plan of Care       The Nursing Plan of Care is developed from the Nursing assessment and Emergency Department Attending provider initial evaluation. The plan of care may be reviewed in the ED Provider note.     The Plan of Care was developed with the following considerations:   Patient / Family readiness to learn indicated by:verbalized understanding  Persons(s) to be included in education: patient  Barriers to Learning/Limitations:No    Signed     Emily Galvez RN    12/12/2022   7:25 AM

## 2022-12-12 NOTE — Clinical Note
[facility] EMERGENCY DEPT  [address] [id number] [phone]    Work/School Note    Date: 12/12/2022    To Whom It May concern:    Deedee Robins was seen and treated today in the emergency room by the following provider(s):  Attending Provider: Lalo Ewing MD.      Deedee Robins is excused from work/school on 12/12/22 and 12/13/22. She is medically clear to return to work/school on 12/14/2022.        Sincerely,          Don Reed MD

## 2022-12-12 NOTE — ED NOTES
Discharge instructions were given to the patient by Ying Kamara RN. The patient left the Emergency Department ambulatory, alert and oriented and in no acute distress with 2 prescriptions. The patient was encouraged to call or return to the ED for worsening issues or problems and was encouraged to schedule a follow up appointment for continuing care. The patient verbalized understanding of discharge instructions and prescriptions, all questions were answered. The patient has no further concerns at this time.

## 2022-12-12 NOTE — ED PROVIDER NOTES
EMERGENCY DEPARTMENT HISTORY AND PHYSICAL EXAM      Date: 12/12/2022  Patient Name: Kareem Eduardo    History of Presenting Illness     Chief Complaint   Patient presents with    Abdominal Pain     History Provided By: Patient    HPI: Kareem Eduardo, 32 y.o. female with past medical history significant for shoulder dislocation who presents via private vehicle to the ED with cc of intermittent left upper quadrant pain and constipation for the past few days. Patient was recently seen here and treated with antibiotics for UTI. She believes the antibiotics have something to do with her symptoms. She did have diarrhea after taking the antibiotics but was taking a probiotic and states that that helped. She now endorses some constipation. Her pain is described as a dull/aching pain that occasionally radiates to the back and left flank. She denies any modifying factors. PMHx: Shoulder dislocation  Social Hx: Social alcohol use, occasional marijuana use, denies tobacco use    PCP: None    There are no other complaints, changes, or physical findings at this time. No current facility-administered medications on file prior to encounter. Current Outpatient Medications on File Prior to Encounter   Medication Sig Dispense Refill    acyclovir (ZOVIRAX) 400 mg tablet Take 1 Tablet by mouth two (2) times a day. 60 Tablet 2    naproxen (NAPROSYN) 500 mg tablet Take 1 Tablet by mouth two (2) times daily (with meals). 20 Tablet 0    butalbital-acetaminophen-caffeine (FIORICET, ESGIC) -40 mg per tablet Take 1 Tab by mouth every six (6) hours as needed for Headache. (Patient not taking: No sig reported) 12 Tab 0     Past History     Past Medical History:  Past Medical History:   Diagnosis Date    Ill-defined condition     hx dislocated shoulder     Past Surgical History:  No past surgical history on file.   Family History:  Family History   Problem Relation Age of Onset    Diabetes Mother     Hypertension Father Social History:  Social History     Tobacco Use    Smoking status: Never    Smokeless tobacco: Never   Vaping Use    Vaping Use: Never used   Substance Use Topics    Alcohol use: Yes     Comment: occasionally    Drug use: Yes     Types: Marijuana     Allergies:  No Known Allergies  Review of Systems   Review of Systems   Constitutional:  Negative for chills and fever. HENT:  Negative for congestion, rhinorrhea, sneezing and sore throat. Eyes:  Negative for redness and visual disturbance. Respiratory:  Negative for shortness of breath. Cardiovascular:  Negative for leg swelling. Gastrointestinal:  Positive for abdominal pain and constipation. Negative for nausea and vomiting. Genitourinary:  Negative for difficulty urinating and frequency. Musculoskeletal:  Negative for back pain, myalgias and neck stiffness. Skin:  Negative for rash. Neurological:  Negative for dizziness, syncope, weakness and headaches. Hematological:  Negative for adenopathy. All other systems reviewed and are negative. Physical Exam   Physical Exam  Vitals and nursing note reviewed. Constitutional:       Appearance: Normal appearance. She is well-developed. HENT:      Head: Normocephalic and atraumatic. Eyes:      Conjunctiva/sclera: Conjunctivae normal.   Cardiovascular:      Rate and Rhythm: Normal rate and regular rhythm. Pulses: Normal pulses. Heart sounds: Normal heart sounds, S1 normal and S2 normal.   Pulmonary:      Effort: Pulmonary effort is normal. No respiratory distress. Breath sounds: Normal breath sounds. No wheezing. Abdominal:      General: Bowel sounds are normal. There is no distension. Palpations: Abdomen is soft. Tenderness: There is abdominal tenderness in the left upper quadrant. There is no guarding or rebound. Musculoskeletal:         General: Normal range of motion.       Cervical back: Full passive range of motion without pain, normal range of motion and neck supple. Skin:     General: Skin is warm and dry. Findings: No rash. Neurological:      Mental Status: She is alert and oriented to person, place, and time. Psychiatric:         Speech: Speech normal.         Behavior: Behavior normal.         Thought Content: Thought content normal.         Judgment: Judgment normal.     Diagnostic Study Results   Labs -     Recent Results (from the past 12 hour(s))   TROPONIN-HIGH SENSITIVITY    Collection Time: 12/12/22  7:57 AM   Result Value Ref Range    Troponin-High Sensitivity 7 0 - 51 ng/L   CBC WITH AUTOMATED DIFF    Collection Time: 12/12/22  7:57 AM   Result Value Ref Range    WBC 8.1 3.6 - 11.0 K/uL    RBC 4.01 3.80 - 5.20 M/uL    HGB 12.4 11.5 - 16.0 g/dL    HCT 37.7 35.0 - 47.0 %    MCV 94.0 80.0 - 99.0 FL    MCH 30.9 26.0 - 34.0 PG    MCHC 32.9 30.0 - 36.5 g/dL    RDW 12.3 11.5 - 14.5 %    PLATELET 296 467 - 499 K/uL    MPV 8.6 (L) 8.9 - 12.9 FL    NRBC 0.0 0  WBC    ABSOLUTE NRBC 0.00 0.00 - 0.01 K/uL    NEUTROPHILS 51 32 - 75 %    LYMPHOCYTES 38 12 - 49 %    MONOCYTES 9 5 - 13 %    EOSINOPHILS 2 0 - 7 %    BASOPHILS 0 0 - 1 %    IMMATURE GRANULOCYTES 0 0.0 - 0.5 %    ABS. NEUTROPHILS 4.1 1.8 - 8.0 K/UL    ABS. LYMPHOCYTES 3.1 0.8 - 3.5 K/UL    ABS. MONOCYTES 0.7 0.0 - 1.0 K/UL    ABS. EOSINOPHILS 0.2 0.0 - 0.4 K/UL    ABS. BASOPHILS 0.0 0.0 - 0.1 K/UL    ABS. IMM. GRANS. 0.0 0.00 - 0.04 K/UL    DF AUTOMATED     METABOLIC PANEL, COMPREHENSIVE    Collection Time: 12/12/22  7:57 AM   Result Value Ref Range    Sodium 140 136 - 145 mmol/L    Potassium 4.5 3.5 - 5.1 mmol/L    Chloride 107 97 - 108 mmol/L    CO2 26 21 - 32 mmol/L    Anion gap 7 5 - 15 mmol/L    Glucose 97 65 - 100 mg/dL    BUN 15 6 - 20 MG/DL    Creatinine 0.92 0.55 - 1.02 MG/DL    BUN/Creatinine ratio 16 12 - 20      eGFR >60 >60 ml/min/1.73m2    Calcium 8.8 8.5 - 10.1 MG/DL    Bilirubin, total 0.2 0.2 - 1.0 MG/DL    ALT (SGPT) 20 12 - 78 U/L    AST (SGOT) 19 15 - 37 U/L    Alk. phosphatase 45 45 - 117 U/L    Protein, total 7.1 6.4 - 8.2 g/dL    Albumin 3.5 3.5 - 5.0 g/dL    Globulin 3.6 2.0 - 4.0 g/dL    A-G Ratio 1.0 (L) 1.1 - 2.2     LIPASE    Collection Time: 12/12/22  7:57 AM   Result Value Ref Range    Lipase 153 73 - 393 U/L   EKG, 12 LEAD, INITIAL    Collection Time: 12/12/22  8:11 AM   Result Value Ref Range    Ventricular Rate 66 BPM    Atrial Rate 66 BPM    P-R Interval 148 ms    QRS Duration 74 ms    Q-T Interval 394 ms    QTC Calculation (Bezet) 413 ms    Calculated P Axis 46 degrees    Calculated R Axis 67 degrees    Calculated T Axis 58 degrees    Diagnosis       Normal sinus rhythm  Normal ECG  No previous ECGs available         Radiologic Studies -   No orders to display     No results found. Medical Decision Making   I am the first provider for this patient. I reviewed the vital signs, available nursing notes, past medical history, past surgical history, family history and social history. Vital Signs-Reviewed the patient's vital signs. Patient Vitals for the past 24 hrs:   Temp Pulse Resp BP SpO2   12/12/22 0710 99.3 °F (37.4 °C) 78 16 (!) 131/96 100 %     Pulse Oximetry Analysis - 100% on RA (normal)    ED EKG interpretation: 08:11  Rhythm: normal sinus rhythm; and regular . Rate (approx.): 66; Axis: normal; P wave: normal; QRS interval: normal ; ST/T wave: normal; Other findings: normal. This EKG was interpreted by Julisa Oliva MD,ED Provider. Records Reviewed: Nursing Notes and Old Medical Records    Provider Notes (Medical Decision Making):   20-year-old female presents with left upper quadrant abdominal pain and constipation for the past 3 days. Differential includes constipation, UTI, and low suspicion for pancreatitis, pyelonephritis, or diverticulitis. ED Course:   Initial assessment performed. The patients presenting problems have been discussed, and they are in agreement with the care plan formulated and outlined with them.   I have encouraged them to ask questions as they arise throughout their visit. Progress Note  10:53 AM  I have re-evaluated pt and her labs are unremarkable. Discussed with patient that this may be constipation versus GERD/hiatal hernia. Patient would prefer to do a bowel evacuation first that she believes that may be contributing to her symptoms. If her symptoms do not improve, she will start a PPI. Progress Note:   Updated pt on all returned results and findings. Discussed the importance of proper follow up as referred below along with return precautions. Pt in agreement with the care plan and expresses agreement with and understanding of all items discussed. Disposition:  Discharge Note:  The pt is ready for discharge. The pt's signs, symptoms, diagnosis, and discharge instructions have been discussed and pt has conveyed their understanding. The pt is to follow up as recommended or return to ER should their symptoms worsen. Plan has been discussed and pt is in agreement. PLAN:  1. Current Discharge Medication List        START taking these medications    Details   pantoprazole (Protonix) 40 mg tablet Take 1 Tablet by mouth daily for 20 days. Qty: 20 Tablet, Refills: 0  Start date: 12/12/2022, End date: 1/1/2023      PEG 3350-Electrolytes (COLYTE;GOLYTELY) solr Drink 6 ounces every 20 minutes until your bowels are running clear  Qty: 4000 mL, Refills: 0  Start date: 12/12/2022           2.    Follow-up Information       Follow up With Specialties Details Why [address]  Call  to arrange primary care [address]  [address]    [address]  Call  to arrange primary care [facility]  [phone]    [address] [place] [address] EMERGENCY DEPT Emergency Medicine  As needed, If symptoms worsen [address]          Return to ED if worse     Diagnosis     Clinical Impression:   1. Left upper quadrant abdominal tenderness without rebound tenderness            Please note that this dictation was completed with Dragon, computer voice recognition software. Quite often unanticipated grammatical, syntax, homophones, and other interpretive errors are inadvertently transcribed by the computer software. Please disregard these errors. Additionally, please excuse any errors that have escaped final proofreading.

## 2022-12-12 NOTE — ED TRIAGE NOTES
Pt arrives to the ED AAOX4 with a c/c of LUQ abd pain, and middle/upper back pain onset 1 week ago. Pt states on 11/14 she was prescribed abx for UTI, and has white vaginal discharge since. Pt denies any n/v, or urinary symptoms at this time. Pt is noted in stable condition and in no acute distress.

## 2022-12-13 LAB
ATRIAL RATE: 66 BPM
CALCULATED P AXIS, ECG09: 46 DEGREES
CALCULATED R AXIS, ECG10: 67 DEGREES
CALCULATED T AXIS, ECG11: 58 DEGREES
DIAGNOSIS, 93000: NORMAL
P-R INTERVAL, ECG05: 148 MS
Q-T INTERVAL, ECG07: 394 MS
QRS DURATION, ECG06: 74 MS
QTC CALCULATION (BEZET), ECG08: 413 MS
VENTRICULAR RATE, ECG03: 66 BPM

## 2023-01-10 ENCOUNTER — HOSPITAL ENCOUNTER (EMERGENCY)
Age: 28
Discharge: HOME | End: 2023-01-10
Attending: STUDENT IN AN ORGANIZED HEALTH CARE EDUCATION/TRAINING PROGRAM
Payer: MEDICAID

## 2023-01-10 ENCOUNTER — APPOINTMENT (OUTPATIENT)
Dept: CT IMAGING | Age: 28
End: 2023-01-10
Attending: STUDENT IN AN ORGANIZED HEALTH CARE EDUCATION/TRAINING PROGRAM
Payer: MEDICAID

## 2023-01-10 VITALS
DIASTOLIC BLOOD PRESSURE: 91 MMHG | OXYGEN SATURATION: 100 % | BODY MASS INDEX: 37.73 KG/M2 | RESPIRATION RATE: 16 BRPM | SYSTOLIC BLOOD PRESSURE: 126 MMHG | HEIGHT: 62 IN | TEMPERATURE: 98 F | HEART RATE: 56 BPM | WEIGHT: 205 LBS

## 2023-01-10 DIAGNOSIS — R10.84 ABDOMINAL PAIN, GENERALIZED: Primary | ICD-10-CM

## 2023-01-10 LAB
ALBUMIN SERPL-MCNC: 3.6 G/DL (ref 3.5–5)
ALBUMIN/GLOB SERPL: 1 (ref 1.1–2.2)
ALP SERPL-CCNC: 45 U/L (ref 45–117)
ALT SERPL-CCNC: 22 U/L (ref 12–78)
ANION GAP SERPL CALC-SCNC: 8 MMOL/L (ref 5–15)
APPEARANCE UR: ABNORMAL
AST SERPL-CCNC: 20 U/L (ref 15–37)
BACTERIA URNS QL MICRO: NEGATIVE /HPF
BASOPHILS # BLD: 0 K/UL (ref 0–0.1)
BASOPHILS NFR BLD: 0 % (ref 0–1)
BILIRUB SERPL-MCNC: 0.2 MG/DL (ref 0.2–1)
BILIRUB UR QL: NEGATIVE
BUN SERPL-MCNC: 15 MG/DL (ref 6–20)
BUN/CREAT SERPL: 17 (ref 12–20)
CALCIUM SERPL-MCNC: 9 MG/DL (ref 8.5–10.1)
CHLORIDE SERPL-SCNC: 105 MMOL/L (ref 97–108)
CO2 SERPL-SCNC: 28 MMOL/L (ref 21–32)
COLOR UR: ABNORMAL
CREAT SERPL-MCNC: 0.88 MG/DL (ref 0.55–1.02)
DIFFERENTIAL METHOD BLD: ABNORMAL
EOSINOPHIL # BLD: 0.1 K/UL (ref 0–0.4)
EOSINOPHIL NFR BLD: 1 % (ref 0–7)
EPITH CASTS URNS QL MICRO: NORMAL /LPF
ERYTHROCYTE [DISTWIDTH] IN BLOOD BY AUTOMATED COUNT: 12 % (ref 11.5–14.5)
GLOBULIN SER CALC-MCNC: 3.6 G/DL (ref 2–4)
GLUCOSE SERPL-MCNC: 81 MG/DL (ref 65–100)
GLUCOSE UR STRIP.AUTO-MCNC: NEGATIVE MG/DL
HCG UR QL: NEGATIVE
HCT VFR BLD AUTO: 38.6 % (ref 35–47)
HGB BLD-MCNC: 13.1 G/DL (ref 11.5–16)
HGB UR QL STRIP: ABNORMAL
IMM GRANULOCYTES # BLD AUTO: 0 K/UL
IMM GRANULOCYTES NFR BLD AUTO: 0 %
KETONES UR QL STRIP.AUTO: ABNORMAL MG/DL
LEUKOCYTE ESTERASE UR QL STRIP.AUTO: ABNORMAL
LIPASE SERPL-CCNC: 145 U/L (ref 73–393)
LYMPHOCYTES # BLD: 4.6 K/UL (ref 0.8–3.5)
LYMPHOCYTES NFR BLD: 47 % (ref 12–49)
MCH RBC QN AUTO: 31.9 PG (ref 26–34)
MCHC RBC AUTO-ENTMCNC: 33.9 G/DL (ref 30–36.5)
MCV RBC AUTO: 93.9 FL (ref 80–99)
MONOCYTES # BLD: 0.7 K/UL (ref 0–1)
MONOCYTES NFR BLD: 7 % (ref 5–13)
NEUTS SEG # BLD: 4.5 K/UL (ref 1.8–8)
NEUTS SEG NFR BLD: 45 % (ref 32–75)
NITRITE UR QL STRIP.AUTO: NEGATIVE
NRBC # BLD: 0 K/UL (ref 0–0.01)
NRBC BLD-RTO: 0 PER 100 WBC
PH UR STRIP: 7 (ref 5–8)
PLATELET # BLD AUTO: 370 K/UL (ref 150–400)
PMV BLD AUTO: 8.7 FL (ref 8.9–12.9)
POTASSIUM SERPL-SCNC: 4.4 MMOL/L (ref 3.5–5.1)
PROT SERPL-MCNC: 7.2 G/DL (ref 6.4–8.2)
PROT UR STRIP-MCNC: 30 MG/DL
RBC # BLD AUTO: 4.11 M/UL (ref 3.8–5.2)
RBC #/AREA URNS HPF: NORMAL /HPF (ref 0–5)
RBC MORPH BLD: ABNORMAL
SODIUM SERPL-SCNC: 141 MMOL/L (ref 136–145)
SP GR UR REFRACTOMETRY: 1.02
UROBILINOGEN UR QL STRIP.AUTO: 1 EU/DL (ref 0.2–1)
WBC # BLD AUTO: 9.9 K/UL (ref 3.6–11)
WBC URNS QL MICRO: NORMAL /HPF (ref 0–4)

## 2023-01-10 PROCEDURE — 83690 ASSAY OF LIPASE: CPT

## 2023-01-10 PROCEDURE — 74011250637 HC RX REV CODE- 250/637: Performed by: STUDENT IN AN ORGANIZED HEALTH CARE EDUCATION/TRAINING PROGRAM

## 2023-01-10 PROCEDURE — 74011000636 HC RX REV CODE- 636: Performed by: STUDENT IN AN ORGANIZED HEALTH CARE EDUCATION/TRAINING PROGRAM

## 2023-01-10 PROCEDURE — 74011250636 HC RX REV CODE- 250/636: Performed by: STUDENT IN AN ORGANIZED HEALTH CARE EDUCATION/TRAINING PROGRAM

## 2023-01-10 PROCEDURE — 99285 EMERGENCY DEPT VISIT HI MDM: CPT

## 2023-01-10 PROCEDURE — 81001 URINALYSIS AUTO W/SCOPE: CPT

## 2023-01-10 PROCEDURE — 96374 THER/PROPH/DIAG INJ IV PUSH: CPT

## 2023-01-10 PROCEDURE — 74177 CT ABD & PELVIS W/CONTRAST: CPT

## 2023-01-10 PROCEDURE — 36415 COLL VENOUS BLD VENIPUNCTURE: CPT

## 2023-01-10 PROCEDURE — 80053 COMPREHEN METABOLIC PANEL: CPT

## 2023-01-10 PROCEDURE — 74011000250 HC RX REV CODE- 250: Performed by: STUDENT IN AN ORGANIZED HEALTH CARE EDUCATION/TRAINING PROGRAM

## 2023-01-10 PROCEDURE — 85025 COMPLETE CBC W/AUTO DIFF WBC: CPT

## 2023-01-10 PROCEDURE — 81025 URINE PREGNANCY TEST: CPT

## 2023-01-10 RX ORDER — ACETAMINOPHEN 325 MG/1
975 TABLET ORAL
Status: COMPLETED | OUTPATIENT
Start: 2023-01-10 | End: 2023-01-10

## 2023-01-10 RX ORDER — KETOROLAC TROMETHAMINE 30 MG/ML
15 INJECTION, SOLUTION INTRAMUSCULAR; INTRAVENOUS
Status: COMPLETED | OUTPATIENT
Start: 2023-01-10 | End: 2023-01-10

## 2023-01-10 RX ORDER — DICYCLOMINE HYDROCHLORIDE 10 MG/1
20 CAPSULE ORAL ONCE
Status: COMPLETED | OUTPATIENT
Start: 2023-01-10 | End: 2023-01-10

## 2023-01-10 RX ORDER — SODIUM CHLORIDE 0.9 % (FLUSH) 0.9 %
5-10 SYRINGE (ML) INJECTION
Status: COMPLETED | OUTPATIENT
Start: 2023-01-10 | End: 2023-01-10

## 2023-01-10 RX ORDER — IBUPROFEN 600 MG/1
600 TABLET ORAL
Qty: 20 TABLET | Refills: 0 | Status: SHIPPED | OUTPATIENT
Start: 2023-01-10

## 2023-01-10 RX ORDER — DICYCLOMINE HYDROCHLORIDE 20 MG/1
20 TABLET ORAL
Qty: 25 TABLET | Refills: 0 | Status: SHIPPED | OUTPATIENT
Start: 2023-01-10 | End: 2023-01-17

## 2023-01-10 RX ORDER — ACETAMINOPHEN 325 MG/1
650 TABLET ORAL
Qty: 20 TABLET | Refills: 0 | Status: SHIPPED | OUTPATIENT
Start: 2023-01-10

## 2023-01-10 RX ADMIN — DICYCLOMINE HYDROCHLORIDE 20 MG: 10 CAPSULE ORAL at 21:05

## 2023-01-10 RX ADMIN — ACETAMINOPHEN 975 MG: 325 TABLET ORAL at 21:05

## 2023-01-10 RX ADMIN — KETOROLAC TROMETHAMINE 15 MG: 30 INJECTION, SOLUTION INTRAMUSCULAR; INTRAVENOUS at 21:05

## 2023-01-10 RX ADMIN — SODIUM CHLORIDE, PRESERVATIVE FREE 10 ML: 5 INJECTION INTRAVENOUS at 21:56

## 2023-01-10 RX ADMIN — IOPAMIDOL 100 ML: 755 INJECTION, SOLUTION INTRAVENOUS at 21:56

## 2023-01-10 NOTE — ED TRIAGE NOTES
Pt presents to ED reporting diffuse abdominal pain intermittently x a few months. Pt reports she recently had a UTI, then yeast infection, then BV, then another yeast infection so thinks all of the medications may be affecting her. 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 Pt reports being prescribed PPI's last time she was seen for abdominal pain but had not yet taken the medication due to being on other supplements at the time.

## 2023-01-10 NOTE — Clinical Note
[facility] EMERGENCY DEPT  [address] [facility] [id number] [phone]    Work/School Note    Date: 1/10/2023    To Whom It May concern:      Jason Dubon was seen and treated today in the emergency room by the following provider(s):  Attending Provider: Zuleyma White MD.      Jason Dubon is excused from work/school on 01/10/23. She is clear to return to work/school on 01/11/23.         Sincerely,          Charisma Mercado MD

## 2023-01-11 NOTE — ED PROVIDER NOTES
EMERGENCY DEPARTMENT HISTORY AND PHYSICAL EXAM      Date: 1/10/2023  Patient Name: Shira Yañez    History of Presenting Illness     Chief Complaint   Patient presents with    Abdominal Pain     History Provided By: Patient    HPI: Shira Yañez, 32 y.o. female with a past medical history significant for medical problems as stated below presents to the ED with cc of abdominal pain. She reports that she has been having cramping left-sided abdominal pain that has been ongoing since around Mily time. She reported that she came to the emergency department with similar symptoms previously and was found to have normal lab work. She was diagnosed with constipation and received a medication to help reports that she did have some improvement of her symptoms after taking medications for constipation. She has been recently found to have UTIs and STDs and has required multiple courses of antibiotics. She always ends up with a yeast infection afterwards. She is concerned that possibly the recent antibiotics have contributed to her abdominal pain. She states that this is always present. It does seem to get relieved a little bit when she has a bowel movement. She denies any rectal bleeding. No nausea or vomiting. She is currently menstruating, but denies any new vaginal discharge. She denies any dysuria either. She reports that she does not want any STD testing currently. She told me that she was concerned about the possibility of HIV, but recently had a negative HIV test about 10 days ago. She does not feel like she currently has a flare of her HSV. She has had an upper endoscopy before, but has never had a colonoscopy. PCP: None    No current facility-administered medications on file prior to encounter.      Current Outpatient Medications on File Prior to Encounter   Medication Sig Dispense Refill    PEG 3350-Electrolytes (COLYTE;GOLYTELY) solr Drink 6 ounces every 20 minutes until your bowels are running clear 4000 mL 0    naproxen (NAPROSYN) 500 mg tablet Take 1 Tablet by mouth two (2) times daily (with meals). 20 Tablet 0    acyclovir (ZOVIRAX) 400 mg tablet Take 1 Tablet by mouth two (2) times a day. 60 Tablet 2    butalbital-acetaminophen-caffeine (FIORICET, ESGIC) -40 mg per tablet Take 1 Tab by mouth every six (6) hours as needed for Headache. (Patient not taking: No sig reported) 12 Tab 0     Past History     Past Medical History:  Past Medical History:   Diagnosis Date    Ill-defined condition     hx dislocated shoulder     Past Surgical History:  No past surgical history on file.     Family History:  Family History   Problem Relation Age of Onset    Diabetes Mother     Hypertension Father        Social History:  Social History     Tobacco Use    Smoking status: Never    Smokeless tobacco: Never   Vaping Use    Vaping Use: Never used   Substance Use Topics    Alcohol use: Yes     Comment: occasionally    Drug use: Yes     Types: Marijuana     Allergies:  No Known Allergies    Review of Systems   Review of Systems  + adbominal pain  + menstration, vaginal bleeding  - vaginal discharge  - dysuria    Physical Exam   Physical Exam  Patient nontoxic well-appearing  Clear to auscultation bilaterally  Regular rate and rhythm, mild bradycardia  Abdomen soft, minimal left lower quadrant and left upper quadrant tenderness with no rebound or guarding    Diagnostic Study Results     Labs -     Recent Results (from the past 24 hour(s))   CBC WITH AUTOMATED DIFF    Collection Time: 01/10/23  8:59 PM   Result Value Ref Range    WBC 9.9 3.6 - 11.0 K/uL    RBC 4.11 3.80 - 5.20 M/uL    HGB 13.1 11.5 - 16.0 g/dL    HCT 38.6 35.0 - 47.0 %    MCV 93.9 80.0 - 99.0 FL    MCH 31.9 26.0 - 34.0 PG    MCHC 33.9 30.0 - 36.5 g/dL    RDW 12.0 11.5 - 14.5 %    PLATELET 788 697 - 412 K/uL    MPV 8.7 (L) 8.9 - 12.9 FL    NRBC 0.0 0  WBC    ABSOLUTE NRBC 0.00 0.00 - 0.01 K/uL    NEUTROPHILS 45 32 - 75 %    LYMPHOCYTES 47 12 - 49 %    MONOCYTES 7 5 - 13 %    EOSINOPHILS 1 0 - 7 %    BASOPHILS 0 0 - 1 %    IMMATURE GRANULOCYTES 0 %    ABS. NEUTROPHILS 4.5 1.8 - 8.0 K/UL    ABS. LYMPHOCYTES 4.6 (H) 0.8 - 3.5 K/UL    ABS. MONOCYTES 0.7 0.0 - 1.0 K/UL    ABS. EOSINOPHILS 0.1 0.0 - 0.4 K/UL    ABS. BASOPHILS 0.0 0.0 - 0.1 K/UL    ABS. IMM. GRANS. 0.0 K/UL    DF SMEAR SCANNED      RBC COMMENTS NORMOCYTIC, NORMOCHROMIC     METABOLIC PANEL, COMPREHENSIVE    Collection Time: 01/10/23  8:59 PM   Result Value Ref Range    Sodium 141 136 - 145 mmol/L    Potassium 4.4 3.5 - 5.1 mmol/L    Chloride 105 97 - 108 mmol/L    CO2 28 21 - 32 mmol/L    Anion gap 8 5 - 15 mmol/L    Glucose 81 65 - 100 mg/dL    BUN 15 6 - 20 MG/DL    Creatinine 0.88 0.55 - 1.02 MG/DL    BUN/Creatinine ratio 17 12 - 20      eGFR >60 >60 ml/min/1.73m2    Calcium 9.0 8.5 - 10.1 MG/DL    Bilirubin, total 0.2 0.2 - 1.0 MG/DL    ALT (SGPT) 22 12 - 78 U/L    AST (SGOT) 20 15 - 37 U/L    Alk.  phosphatase 45 45 - 117 U/L    Protein, total 7.2 6.4 - 8.2 g/dL    Albumin 3.6 3.5 - 5.0 g/dL    Globulin 3.6 2.0 - 4.0 g/dL    A-G Ratio 1.0 (L) 1.1 - 2.2     LIPASE    Collection Time: 01/10/23  8:59 PM   Result Value Ref Range    Lipase 145 73 - 393 U/L   URINALYSIS W/ RFLX MICROSCOPIC    Collection Time: 01/10/23  8:59 PM   Result Value Ref Range    Color CHARLIE      Appearance CLOUDY (A) CLEAR      Specific gravity 1.020      pH (UA) 7.0 5.0 - 8.0      Protein 30 (A) NEG mg/dL    Glucose Negative NEG mg/dL    Ketone TRACE (A) NEG mg/dL    Bilirubin Negative NEG      Blood LARGE (A) NEG      Urobilinogen 1.0 0.2 - 1.0 EU/dL    Nitrites Negative NEG      Leukocyte Esterase SMALL (A) NEG     URINE MICROSCOPIC ONLY    Collection Time: 01/10/23  8:59 PM   Result Value Ref Range    WBC 0-4 0 - 4 /hpf    RBC 5-10 0 - 5 /hpf    Epithelial cells FEW FEW /lpf    Bacteria Negative NEG /hpf   HCG URINE, QL. - POC    Collection Time: 01/10/23  9:09 PM   Result Value Ref Range    Pregnancy test,urine (POC) Negative NEG         Radiologic Studies -   CT ABD PELV W CONT   Final Result   No acute findings. CT Results  (Last 48 hours)                 01/10/23 2200  CT ABD PELV W CONT Final result    Impression:  No acute findings. Narrative:  EXAM: CT ABD PELV W CONT       INDICATION: left-sided abdominal pain       COMPARISON: CT abdomen pelvis October 4, 2013        CONTRAST: 100 mL of Isovue-370. ORAL CONTRAST: None       TECHNIQUE:    Following the uneventful intravenous administration of contrast, thin axial   images were obtained through the abdomen and pelvis, in portal venous and   delayed phases. Coronal and sagittal reconstructions were generated. CT dose   reduction was achieved through use of a standardized protocol tailored for this   examination and automatic exposure control for dose modulation. FINDINGS:    LOWER THORAX: No significant abnormality in the incidentally imaged lower chest.   LIVER: No mass. BILIARY TREE: Gallbladder is within normal limits. CBD is not dilated. SPLEEN: within normal limits. PANCREAS: No mass or ductal dilatation. ADRENALS: Unremarkable. KIDNEYS: No mass, calculus, or hydronephrosis. STOMACH: Unremarkable. SMALL BOWEL: No dilatation or wall thickening. COLON: No dilatation or wall thickening. APPENDIX: Unremarkable. PERITONEUM: No ascites or pneumoperitoneum. RETROPERITONEUM: No lymphadenopathy or aortic aneurysm. REPRODUCTIVE ORGANS: Normal uterus and adnexa. URINARY BLADDER: No mass or calculus. BONES: No destructive bone lesion. ABDOMINAL WALL: No mass or hernia. ADDITIONAL COMMENTS: N/A                 CXR Results  (Last 48 hours)      None              Medical Decision Making   I am the first provider for this patient. I reviewed the vital signs, available nursing notes, past medical history, past surgical history, family history and social history. Vital Signs-Reviewed the patient's vital signs.   Patient Vitals for the past 12 hrs:   Temp Pulse Resp BP SpO2   01/10/23 2238 98 °F (36.7 °C) (!) 56 16 (!) 126/91 100 %   01/10/23 1758 98.5 °F (36.9 °C) 75 18 (!) 149/89 100 %       Records Reviewed: Nursing records and medical records reviewed    Medical Decision Making  Patient presents emergency department with continued abdominal pain has been relatively subacute. Her exam is largely benign but she does have some mild tenderness. She has previously been diagnosed with constipation, but no imaging is recently been obtained. I do think constipation is reasonable as her pain is where her descending colon is and she has had some relief with bowel movements. Given the length of time of her symptoms though I do think it is reasonable to obtain CT imaging at this point for further evaluation. Doubt intra-abdominal surgical pathology. We will repeat her basic labs and treat pain with p.o. Tylenol, IV Toradol, and p.o. dicyclomine. Possible that she has some component of IBS as she has relief of pain with bowel movements. No fam hx of IBD, but something like UC or Crohns is possible. No new vaginal symptoms and patient does not want STD testing or treatment even thought I offered these to her. Amount and/or Complexity of Data Reviewed  Labs: ordered. Radiology: ordered. Risk  OTC drugs. Prescription drug management. ED Course:   Initial assessment performed. The patients presenting problems have been discussed, and they are in agreement with the care plan formulated and outlined with them. I have encouraged them to ask questions as they arise throughout their visit. ED Course as of 01/11/23 0142   Tue Nile 10, 2023   8617 Patient with no acute findings on lab work or imaging. These were all reviewed with the patient. She has had significant improvement with PO Tylenol, IV Toradol, and PO Bentyl. Will provide prescription for PO Tylenol, PO Bentyl, and PO ibuprofen.   Discussed customary return precautions and importance of following up with her gastroenterologist that she has seen previously. She also will follow-up with her primary care provider. [WB]      ED Course User Index  [WB] Megha Erwin MD       Critical Care:  None    Disposition:  Home    DISCHARGE PLAN:  1. Discharge Medication List as of 1/10/2023 11:16 PM        START taking these medications    Details   dicyclomine (BENTYL) 20 mg tablet Take 1 Tablet by mouth every six (6) hours as needed for Abdominal Cramps for up to 7 days. , Normal, Disp-25 Tablet, R-0      acetaminophen (TYLENOL) 325 mg tablet Take 2 Tablets by mouth every six (6) hours as needed for Pain., Normal, Disp-20 Tablet, R-0      ibuprofen (MOTRIN) 600 mg tablet Take 1 Tablet by mouth every six (6) hours as needed for Pain., Normal, Disp-20 Tablet, R-0           CONTINUE these medications which have NOT CHANGED    Details   PEG 3350-Electrolytes (COLYTE;GOLYTELY) solr Drink 6 ounces every 20 minutes until your bowels are running clear, Normal, Disp-4000 mL, R-0      naproxen (NAPROSYN) 500 mg tablet Take 1 Tablet by mouth two (2) times daily (with meals). , Normal, Disp-20 Tablet, R-0      acyclovir (ZOVIRAX) 400 mg tablet Take 1 Tablet by mouth two (2) times a day., Normal, Disp-60 Tablet, R-2      butalbital-acetaminophen-caffeine (FIORICET, ESGIC) -40 mg per tablet Take 1 Tab by mouth every six (6) hours as needed for Headache., Normal, Disp-12 Tab,R-0           2.    Follow-up Information       Follow up With Specialties Details Why Contact Info    [facility]  Schedule an appointment as soon as possible for a visit   [address] [facility] [address] [address]    [facility] EMERGENCY DEPT Emergency Medicine  As needed, If symptoms worsen [address] [facility]  Schedule an appointment as soon as possible for a visit   [place] [address]  [phone]          3. Return to ED if worse     Diagnosis     Clinical Impression:   1. Abdominal pain, generalized      Attestations:    Vinayak Larry MD    Please note that this dictation was completed with [email or web address], the computer voice recognition software. Quite often unanticipated grammatical, syntax, homophones, and other interpretive errors are inadvertently transcribed by the computer software. Please disregard these errors. Please excuse any errors that have escaped final proofreading. Thank you.

## 2023-01-11 NOTE — ED NOTES
Pt resting quietly on stretcher watching TV. VS and patient up to date on plan of care. No requests or complaints at this time.

## 2023-01-11 NOTE — ED NOTES
Discharge instructions were given to the patient by Catherine Cartagena RN. The patient left the Emergency Department ambulatory, alert and oriented and in no acute distress with three prescriptions. The patient was encouraged to call or return to the ED for worsening issues or problems and was encouraged to schedule a follow up appointment for continuing care. The patient verbalized understanding of discharge instructions and prescriptions, all questions were answered. The patient has no further concerns at this time.

## 2023-01-11 NOTE — ED NOTES
Emergency Department Nursing Plan of Care       The Nursing Plan of Care is developed from the Nursing assessment and Emergency Department Attending provider initial evaluation. The plan of care may be reviewed in the ED Provider note.     The Plan of Care was developed with the following considerations:   Patient / Family readiness to learn indicated by:verbalized understanding  Persons(s) to be included in education: patient  Barriers to Learning/Limitations:No    Signed     Fabio Castaneda RN    1/10/2023   8:20 PM

## 2023-01-11 NOTE — DISCHARGE INSTRUCTIONS
Please make a followup with your primary care provider and gastroenterologist.  If you have any new or worsening concerning medical symptoms please return to the emergency department.

## 2023-04-06 ENCOUNTER — HOSPITAL ENCOUNTER (OUTPATIENT)
Age: 28
End: 2023-04-06
Attending: EMERGENCY MEDICINE
Payer: MEDICAID

## 2023-04-06 PROCEDURE — 74011250637 HC RX REV CODE- 250/637: Performed by: EMERGENCY MEDICINE

## 2023-04-06 RX ADMIN — IBUPROFEN 600 MG: 600 TABLET, FILM COATED ORAL at 23:18

## 2023-04-07 NOTE — ED PROVIDER NOTES
[facility] EMERGENCY DEPT  EMERGENCY DEPARTMENT ENCOUNTER       Pt Name: Dilan Christopher  MRN: [id number]  [place] 1995  Date of evaluation: 4/6/2023  Provider: Tara Meyer MD   PCP: None  Note Started: 11:13 PM 4/6/23     CHIEF COMPLAINT       Chief Complaint   Patient presents with    Fever     Pt reports having a 100.3 temp this am (took meds) went to work & keeps running [employer]. Last time took fever reducer x 2 hours ago, took Tylenol        HISTORY OF PRESENT ILLNESS: 1 or more elements      History From: patient, History limited by: none     Dilan Christopher is a 29 y.o. female with no significant past medical history presents emerged department chief complaint of fever. Patient reports symptoms began this morning. Awoke with measured fever and sore throat. Went to work after taking Tylenol reports it worsened. Denies any other symptoms. Reports mild headache but denies rhinorrhea, cough, chest pain or shortness of breath he denies abdominal pain, urinary symptoms. Denies nausea, vomiting or diarrhea. Family member sick 2 weeks ago. Please See MDM for Additional Details of the HPI/PMH  Nursing Notes were all reviewed and agreed with or any disagreements were addressed in the HPI. REVIEW OF SYSTEMS        Positives and Pertinent negatives as per HPI. PAST HISTORY     Past Medical History:  Past Medical History:   Diagnosis Date    Ill-defined condition     hx dislocated shoulder       Past Surgical History:  No past surgical history on file.     Family History:  Family History   Problem Relation Age of Onset    Diabetes Mother     Hypertension Father        Social History:  Social History     Tobacco Use    Smoking status: Never    Smokeless tobacco: Never   Vaping Use    Vaping Use: Never used   Substance Use Topics    Alcohol use: Yes     Comment: occasionally    Drug use: Yes     Types: Marijuana       Allergies:  No Known Allergies    CURRENT MEDICATIONS      Previous Medications ACETAMINOPHEN (TYLENOL) 325 MG TABLET    Take 2 Tablets by mouth every six (6) hours as needed for Pain. ACYCLOVIR (ZOVIRAX) 400 MG TABLET    Take 1 Tablet by mouth two (2) times a day. BUTALBITAL-ACETAMINOPHEN-CAFFEINE (FIORICET, ESGIC) -40 MG PER TABLET    Take 1 Tab by mouth every six (6) hours as needed for Headache. IBUPROFEN (MOTRIN) 600 MG TABLET    Take 1 Tablet by mouth every six (6) hours as needed for Pain. NAPROXEN (NAPROSYN) 500 MG TABLET    Take 1 Tablet by mouth two (2) times daily (with meals). PEG 3350-ELECTROLYTES (COLYTE;GOLYTELY) SOLR    Drink 6 ounces every 20 minutes until your bowels are running clear       SCREENINGS               No data recorded         PHYSICAL EXAM      ED Triage Vitals [04/06/23 4224]   ED Encounter Vitals Group      /75      Pulse (Heart Rate) (!) 102      Resp Rate 18      Temp (!) 101.5 °F (38.6 °C)      Temp src       O2 Sat (%) 95 %      Weight 195 lb      Height 5' 2\"        Physical Exam  Vitals and nursing note reviewed. Constitutional:       Comments: 27-year-old female, resting bed, no acute distress   HENT:      Head: Normocephalic. Mouth/Throat:      Mouth: Mucous membranes are moist.      Pharynx: Oropharyngeal exudate and posterior oropharyngeal erythema present. Tonsils: Tonsillar exudate present. Cardiovascular:      Rate and Rhythm: Regular rhythm. Tachycardia present. Pulmonary:      Effort: Pulmonary effort is normal.      Breath sounds: Normal breath sounds. Abdominal:      General: Abdomen is flat. Tenderness: There is no abdominal tenderness. Lymphadenopathy:      Cervical: Cervical adenopathy (Right paracervical) present. Skin:     General: Skin is warm. Neurological:      General: No focal deficit present. Mental Status: She is alert.    Psychiatric:         Mood and Affect: Mood normal.        DIAGNOSTIC RESULTS   LABS:     Recent Results (from the past 12 hour(s))   STREP AG SCREEN, GROUP A    Collection Time: 04/06/23 11:15 PM    Specimen: Swab; Throat   Result Value Ref Range    Group A Strep Ag ID Negative NEG     COVID-19 WITH INFLUENZA A/B    Collection Time: 04/06/23 11:15 PM   Result Value Ref Range    SARS-CoV-2 by PCR Not detected NOTD      Influenza A by PCR Not detected      Influenza B by PCR Not detected          EKG: If performed, independent interpretation documented below in the MDM section     RADIOLOGY:  Non-plain film images such as CT, Ultrasound and MRI are read by the radiologist. Plain radiographic images are visualized and preliminarily interpreted by the ED Provider with the findings documented in the MDM section. Interpretation per the Radiologist below, if available at the time of this note:     No results found. PROCEDURES   Unless otherwise noted below, none  Procedures     CRITICAL CARE TIME   0    EMERGENCY DEPARTMENT COURSE and DIFFERENTIAL DIAGNOSIS/MDM   Vitals:    Vitals:    04/06/23 2244 04/07/23 0038   BP: 134/75    Pulse: (!) 102 89   Resp: 18    Temp: (!) 101.5 °F (38.6 °C) 99.7 °F (37.6 °C)   SpO2: 95%    Weight: 88.5 kg (195 lb)    Height: 5' 2\" (1.575 m)         Patient was given the following medications:  Medications   ibuprofen (MOTRIN) tablet 600 mg (600 mg Oral Given 4/6/23 9038)       Medical Decision Making  20-year-old female presents emerged department chief plaint of fever. She has a mild reactive tachycardia but appears well and nontoxic. Does report sore throat, clinically appears to have strep throat with exudates, lymphadenopathy, fever and lack of cough. Will send rapid strep. We will also check for COVID and flu. Will medicate with ibuprofen. Anticipate if strep is negative would treat empirically for antibiotics. Do not believe labs or imaging are necessary at this time. Amount and/or Complexity of Data Reviewed  Labs: ordered. Decision-making details documented in ED Course.     Risk  Prescription drug management. ED Course as of 04/07/23 0043   Thu Apr 06, 2023   2356 COVID and influenza negative. Rapid strep negative. [MB]   Fri Apr 07, 2023   2706 On reassessment, patient is sleeping in bed, appears well and non-toxic. We will discharge with empiric streptococcal coverage, PCP follow-up and return precautions. [MB]      ED Course User Index  [MB] Cortney Cee MD         FINAL IMPRESSION     1. Acute streptococcal pharyngitis    2. Fever, unspecified fever cause          DISPOSITION/PLAN   Alysha Servin's  results have been reviewed with her. She has been counseled regarding her diagnosis, treatment, and plan. She verbally conveys understanding and agreement of the signs, symptoms, diagnosis, treatment and prognosis and additionally agrees to follow up as discussed. She also agrees with the care-plan and conveys that all of her questions have been answered. I have also provided discharge instructions for her that include: educational information regarding their diagnosis and treatment, and list of reasons why they would want to return to the ED prior to their follow-up appointment, should her condition change. CLINICAL IMPRESSION    Discharged     PATIENT REFERRED TO:  Follow-up Information       Follow up With Specialties Details Why [address] [facility] EMERGENCY DEPT Emergency Medicine  If symptoms worsen [facility]  [phone]              DISCHARGE MEDICATIONS:  Current Discharge Medication List        START taking these medications    Details   amoxicillin (AMOXIL) 500 mg tablet Take 1 Tablet by mouth two (2) times a day for 10 days. Qty: 20 Tablet, Refills: 0  Start date: 4/7/2023, End date: 4/17/2023               DISCONTINUED MEDICATIONS:  Current Discharge Medication List          I am the Primary Clinician of Record.    Shanti Reed MD (electronically signed)    (Please note that parts of this dictation were completed with voice recognition software. Quite often unanticipated grammatical, syntax, homophones, and other interpretive errors are inadvertently transcribed by the computer software. Please disregards these errors.  Please excuse any errors that have escaped final proofreading.)

## 2023-04-07 NOTE — DISCHARGE INSTRUCTIONS
You are seen in the emergency department for your symptoms. Please use the amoxicillin. Please take Motrin Tylenol for pain. Return for new or worsening symptoms anytime.

## 2023-05-21 RX ORDER — ACYCLOVIR 400 MG/1
400 TABLET ORAL 2 TIMES DAILY
COMMUNITY
Start: 2022-11-02

## 2023-05-21 RX ORDER — NAPROXEN 500 MG/1
500 TABLET ORAL 2 TIMES DAILY WITH MEALS
COMMUNITY
Start: 2022-11-13

## 2023-05-21 RX ORDER — BUTALBITAL, ACETAMINOPHEN AND CAFFEINE 50; 325; 40 MG/1; MG/1; MG/1
1 TABLET ORAL EVERY 6 HOURS PRN
COMMUNITY
Start: 2020-10-14

## 2023-05-21 RX ORDER — IBUPROFEN 600 MG/1
600 TABLET ORAL EVERY 6 HOURS PRN
COMMUNITY
Start: 2023-01-10

## 2023-05-21 RX ORDER — ACETAMINOPHEN 325 MG/1
650 TABLET ORAL EVERY 6 HOURS PRN
COMMUNITY
Start: 2023-01-10

## 2023-05-22 ENCOUNTER — HOSPITAL ENCOUNTER (EMERGENCY)
Facility: HOSPITAL | Age: 28
Discharge: HOME | End: 2023-05-22
Payer: MEDICAID

## 2023-05-22 VITALS
SYSTOLIC BLOOD PRESSURE: 138 MMHG | DIASTOLIC BLOOD PRESSURE: 82 MMHG | BODY MASS INDEX: 35.88 KG/M2 | HEIGHT: 62 IN | OXYGEN SATURATION: 98 % | HEART RATE: 87 BPM | WEIGHT: 195 LBS | TEMPERATURE: 98.5 F | RESPIRATION RATE: 17 BRPM

## 2023-05-22 DIAGNOSIS — R10.9 LEFT SIDED ABDOMINAL PAIN: Primary | ICD-10-CM

## 2023-05-22 LAB
ALBUMIN SERPL-MCNC: 3.5 G/DL (ref 3.5–5)
ALBUMIN/GLOB SERPL: 0.9 (ref 1.1–2.2)
ALP SERPL-CCNC: 55 U/L (ref 45–117)
ALT SERPL-CCNC: 23 U/L (ref 12–78)
ANION GAP SERPL CALC-SCNC: 8 MMOL/L (ref 5–15)
APPEARANCE UR: ABNORMAL
AST SERPL-CCNC: 21 U/L (ref 15–37)
BACTERIA URNS QL MICRO: ABNORMAL /HPF
BASOPHILS # BLD: 0 K/UL (ref 0–0.1)
BASOPHILS NFR BLD: 0 % (ref 0–1)
BILIRUB SERPL-MCNC: 0.3 MG/DL (ref 0.2–1)
BILIRUB UR QL: NEGATIVE
BUN SERPL-MCNC: 11 MG/DL (ref 6–20)
BUN/CREAT SERPL: 10 (ref 12–20)
CALCIUM SERPL-MCNC: 8.8 MG/DL (ref 8.5–10.1)
CHLORIDE SERPL-SCNC: 105 MMOL/L (ref 97–108)
CO2 SERPL-SCNC: 29 MMOL/L (ref 21–32)
COLOR UR: ABNORMAL
CREAT SERPL-MCNC: 1.05 MG/DL (ref 0.55–1.02)
DIFFERENTIAL METHOD BLD: ABNORMAL
EOSINOPHIL # BLD: 0.1 K/UL (ref 0–0.4)
EOSINOPHIL NFR BLD: 1 % (ref 0–7)
EPITH CASTS URNS QL MICRO: ABNORMAL /LPF
ERYTHROCYTE [DISTWIDTH] IN BLOOD BY AUTOMATED COUNT: 12 % (ref 11.5–14.5)
GLOBULIN SER CALC-MCNC: 3.8 G/DL (ref 2–4)
GLUCOSE SERPL-MCNC: 92 MG/DL (ref 65–100)
GLUCOSE UR STRIP.AUTO-MCNC: NEGATIVE MG/DL
HCG UR QL: NEGATIVE
HCT VFR BLD AUTO: 38 % (ref 35–47)
HGB BLD-MCNC: 13 G/DL (ref 11.5–16)
HGB UR QL STRIP: ABNORMAL
IMM GRANULOCYTES # BLD AUTO: 0 K/UL (ref 0–0.04)
IMM GRANULOCYTES NFR BLD AUTO: 0 % (ref 0–0.5)
KETONES UR QL STRIP.AUTO: ABNORMAL MG/DL
LEUKOCYTE ESTERASE UR QL STRIP.AUTO: ABNORMAL
LIPASE SERPL-CCNC: 113 U/L (ref 73–393)
LYMPHOCYTES # BLD: 2.6 K/UL (ref 0.8–3.5)
LYMPHOCYTES NFR BLD: 34 % (ref 12–49)
MCH RBC QN AUTO: 32.1 PG (ref 26–34)
MCHC RBC AUTO-ENTMCNC: 34.2 G/DL (ref 30–36.5)
MCV RBC AUTO: 93.8 FL (ref 80–99)
MONOCYTES # BLD: 0.4 K/UL (ref 0–1)
MONOCYTES NFR BLD: 5 % (ref 5–13)
NEUTS SEG # BLD: 4.4 K/UL (ref 1.8–8)
NEUTS SEG NFR BLD: 60 % (ref 32–75)
NITRITE UR QL STRIP.AUTO: NEGATIVE
NRBC # BLD: 0 K/UL (ref 0–0.01)
NRBC BLD-RTO: 0 PER 100 WBC
PH UR STRIP: 6 (ref 5–8)
PLATELET # BLD AUTO: 402 K/UL (ref 150–400)
PMV BLD AUTO: 8.7 FL (ref 8.9–12.9)
POTASSIUM SERPL-SCNC: 4 MMOL/L (ref 3.5–5.1)
PROT SERPL-MCNC: 7.3 G/DL (ref 6.4–8.2)
PROT UR STRIP-MCNC: NEGATIVE MG/DL
RBC # BLD AUTO: 4.05 M/UL (ref 3.8–5.2)
RBC #/AREA URNS HPF: ABNORMAL /HPF (ref 0–5)
SODIUM SERPL-SCNC: 142 MMOL/L (ref 136–145)
SP GR UR REFRACTOMETRY: 1.02
URINE CULTURE IF INDICATED: ABNORMAL
UROBILINOGEN UR QL STRIP.AUTO: 1 EU/DL (ref 0.2–1)
WBC # BLD AUTO: 7.5 K/UL (ref 3.6–11)
WBC URNS QL MICRO: ABNORMAL /HPF (ref 0–4)

## 2023-05-22 PROCEDURE — 99283 EMERGENCY DEPT VISIT LOW MDM: CPT

## 2023-05-22 PROCEDURE — 80053 COMPREHEN METABOLIC PANEL: CPT

## 2023-05-22 PROCEDURE — 85025 COMPLETE CBC W/AUTO DIFF WBC: CPT

## 2023-05-22 PROCEDURE — 81025 URINE PREGNANCY TEST: CPT

## 2023-05-22 PROCEDURE — 83690 ASSAY OF LIPASE: CPT

## 2023-05-22 PROCEDURE — 36415 COLL VENOUS BLD VENIPUNCTURE: CPT

## 2023-05-22 PROCEDURE — 81001 URINALYSIS AUTO W/SCOPE: CPT

## 2023-05-22 ASSESSMENT — PAIN - FUNCTIONAL ASSESSMENT: PAIN_FUNCTIONAL_ASSESSMENT: 0-10

## 2023-05-22 ASSESSMENT — PAIN DESCRIPTION - ORIENTATION: ORIENTATION: LEFT;LOWER;UPPER

## 2023-05-22 ASSESSMENT — PAIN SCALES - GENERAL: PAINLEVEL_OUTOF10: 9

## 2023-05-22 ASSESSMENT — PAIN DESCRIPTION - LOCATION: LOCATION: ABDOMEN

## 2023-05-22 ASSESSMENT — LIFESTYLE VARIABLES
HOW MANY STANDARD DRINKS CONTAINING ALCOHOL DO YOU HAVE ON A TYPICAL DAY: 1 OR 2
HOW OFTEN DO YOU HAVE A DRINK CONTAINING ALCOHOL: 2-4 TIMES A MONTH

## 2023-05-22 ASSESSMENT — PAIN DESCRIPTION - DESCRIPTORS: DESCRIPTORS: ACHING;CRAMPING

## 2023-05-22 ASSESSMENT — PAIN DESCRIPTION - PAIN TYPE: TYPE: ACUTE PAIN

## 2023-05-22 NOTE — ED NOTES
Patient discharged. Discharge instructions reviewed.  Patient verbalized understanding     Maninder Xavier RN  05/22/23 4759

## 2023-05-22 NOTE — ED PROVIDER NOTES
[facility] EMERGENCY DEPT  EMERGENCY DEPARTMENT ENCOUNTER       Pt Name: Rubi Bolanos  MRN: [id number]  [place] 1995  Date of evaluation: 5/22/2023  Provider: VILMA Serrato   PCP: None None  Note Started: 4:28 PM 5/22/23     CHIEF COMPLAINT       Chief Complaint   Patient presents with    Abdominal Pain        HISTORY OF PRESENT ILLNESS: 1 or more elements      History From: Patient  HPI Limitations: None     Rubi Bolanos is a 29 y.o. female who presents BIB self with CC of left-sided abdominal pain that has been ongoing for the last 2 to 3 weeks. Prior to the onset of her pain, she was diagnosed with presumptive strep throat and treated with amoxicillin. While taking the antibiotic she developed a rash. She was seen at an outside hospital and told that she actually had mono based on positive IgG CMV results. Monospot was evidently negative. Since taking the antibiotics she has had ongoing left-sided abdominal pain. She also notes an intermittent pink patchy rash to her bilateral arms that is not present at this time. She also notes the area around her nose appears to be redder than usual.  She denies fever, nausea, vomiting, diarrhea, constipation, dysuria, urinary frequency or urgency, abnormal vaginal discharge, vaginal bleeding for STI. Nursing Notes were all reviewed and agreed with or any disagreements were addressed in the HPI. REVIEW OF SYSTEMS      Review of Systems   Gastrointestinal:  Positive for abdominal pain. Positives and Pertinent negatives as per HPI. PAST HISTORY     Past Medical History:  Past Medical History:   Diagnosis Date    Ill-defined condition     hx dislocated shoulder       Past Surgical History:  History reviewed. No pertinent surgical history.     Family History:  Family History   Problem Relation Age of Onset    Hypertension Father     Diabetes Mother        Social History:  Social History     Tobacco Use    Smoking status: Never    Smokeless

## 2023-05-22 NOTE — ED NOTES
Pt presents ambulatory to ED complaining of LLQ pain, and back pain. Pt is alert and oriented x 4, RR even and unlabored, skin is warm and dry. Assesment completed and pt updated on plan of care. Emergency Department Nursing Plan of Care       The Nursing Plan of Care is developed from the Nursing assessment and Emergency Department Attending provider initial evaluation. The plan of care may be reviewed in the ED Provider note.     The Plan of Care was developed with the following considerations:   Patient / Family readiness to learn indicated by:verbalized understanding  Persons(s) to be included in education: patient  Barriers to Learning/Limitations:None    Signed     Elsie Dobson RN    5/22/2023   3:01 PM       Elsie Dobson RN  05/22/23 6293

## 2023-05-22 NOTE — ED TRIAGE NOTES
Pt reports being DX with [place] in April after being DX with strep & taking amoxicillin. Pt reports symptoms started after taking amoxicillin for strep in April. Pt reports L sided ABD pain upper & lower quadrants. Pt denies urinary symptoms denies nausea and vomiting.

## 2023-05-24 ASSESSMENT — ENCOUNTER SYMPTOMS: ABDOMINAL PAIN: 1

## 2023-07-16 ENCOUNTER — HOSPITAL ENCOUNTER (EMERGENCY)
Facility: HOSPITAL | Age: 28
Discharge: HOME | End: 2023-07-16
Payer: MEDICAID

## 2023-07-16 ENCOUNTER — APPOINTMENT (OUTPATIENT)
Facility: HOSPITAL | Age: 28
End: 2023-07-16
Payer: MEDICAID

## 2023-07-16 VITALS
BODY MASS INDEX: 35.88 KG/M2 | OXYGEN SATURATION: 98 % | SYSTOLIC BLOOD PRESSURE: 150 MMHG | HEART RATE: 88 BPM | TEMPERATURE: 97.7 F | HEIGHT: 62 IN | DIASTOLIC BLOOD PRESSURE: 95 MMHG | RESPIRATION RATE: 18 BRPM | WEIGHT: 195 LBS

## 2023-07-16 DIAGNOSIS — R51.9 NONINTRACTABLE EPISODIC HEADACHE, UNSPECIFIED HEADACHE TYPE: Primary | ICD-10-CM

## 2023-07-16 DIAGNOSIS — R07.89 MUSCULOSKELETAL CHEST PAIN: ICD-10-CM

## 2023-07-16 DIAGNOSIS — R39.15 URINARY URGENCY: ICD-10-CM

## 2023-07-16 LAB
ALBUMIN SERPL-MCNC: 3.5 G/DL (ref 3.5–5)
ALBUMIN/GLOB SERPL: 1 (ref 1.1–2.2)
ALP SERPL-CCNC: 46 U/L (ref 45–117)
ALT SERPL-CCNC: 25 U/L (ref 12–78)
ANION GAP SERPL CALC-SCNC: 5 MMOL/L (ref 5–15)
APPEARANCE UR: ABNORMAL
AST SERPL-CCNC: 21 U/L (ref 15–37)
BACTERIA URNS QL MICRO: ABNORMAL /HPF
BASOPHILS # BLD: 0 K/UL (ref 0–0.1)
BASOPHILS NFR BLD: 0 % (ref 0–1)
BILIRUB SERPL-MCNC: 0.2 MG/DL (ref 0.2–1)
BILIRUB UR QL: NEGATIVE
BUN SERPL-MCNC: 13 MG/DL (ref 6–20)
BUN/CREAT SERPL: 13 (ref 12–20)
CALCIUM SERPL-MCNC: 8.5 MG/DL (ref 8.5–10.1)
CHLORIDE SERPL-SCNC: 106 MMOL/L (ref 97–108)
CO2 SERPL-SCNC: 31 MMOL/L (ref 21–32)
COLOR UR: ABNORMAL
CREAT SERPL-MCNC: 0.99 MG/DL (ref 0.55–1.02)
DIFFERENTIAL METHOD BLD: ABNORMAL
EOSINOPHIL # BLD: 0.1 K/UL (ref 0–0.4)
EOSINOPHIL NFR BLD: 1 % (ref 0–7)
EPITH CASTS URNS QL MICRO: ABNORMAL /LPF
ERYTHROCYTE [DISTWIDTH] IN BLOOD BY AUTOMATED COUNT: 12.7 % (ref 11.5–14.5)
GLOBULIN SER CALC-MCNC: 3.5 G/DL (ref 2–4)
GLUCOSE SERPL-MCNC: 91 MG/DL (ref 65–100)
GLUCOSE UR STRIP.AUTO-MCNC: NEGATIVE MG/DL
HCG UR QL: NEGATIVE
HCT VFR BLD AUTO: 38.6 % (ref 35–47)
HGB BLD-MCNC: 12.7 G/DL (ref 11.5–16)
HGB UR QL STRIP: NEGATIVE
IMM GRANULOCYTES # BLD AUTO: 0 K/UL (ref 0–0.04)
IMM GRANULOCYTES NFR BLD AUTO: 0 % (ref 0–0.5)
KETONES UR QL STRIP.AUTO: NEGATIVE MG/DL
LEUKOCYTE ESTERASE UR QL STRIP.AUTO: ABNORMAL
LYMPHOCYTES # BLD: 4.1 K/UL (ref 0.8–3.5)
LYMPHOCYTES NFR BLD: 45 % (ref 12–49)
MCH RBC QN AUTO: 31.8 PG (ref 26–34)
MCHC RBC AUTO-ENTMCNC: 32.9 G/DL (ref 30–36.5)
MCV RBC AUTO: 96.7 FL (ref 80–99)
MONOCYTES # BLD: 0.5 K/UL (ref 0–1)
MONOCYTES NFR BLD: 5 % (ref 5–13)
NEUTS SEG # BLD: 4.3 K/UL (ref 1.8–8)
NEUTS SEG NFR BLD: 49 % (ref 32–75)
NITRITE UR QL STRIP.AUTO: NEGATIVE
NRBC # BLD: 0 K/UL (ref 0–0.01)
NRBC BLD-RTO: 0 PER 100 WBC
PH UR STRIP: 6.5 (ref 5–8)
PLATELET # BLD AUTO: 398 K/UL (ref 150–400)
PMV BLD AUTO: 8.6 FL (ref 8.9–12.9)
POTASSIUM SERPL-SCNC: 3.8 MMOL/L (ref 3.5–5.1)
PROT SERPL-MCNC: 7 G/DL (ref 6.4–8.2)
PROT UR STRIP-MCNC: NEGATIVE MG/DL
RBC # BLD AUTO: 3.99 M/UL (ref 3.8–5.2)
RBC #/AREA URNS HPF: ABNORMAL /HPF (ref 0–5)
SODIUM SERPL-SCNC: 142 MMOL/L (ref 136–145)
SP GR UR REFRACTOMETRY: 1.02
TROPONIN I SERPL HS-MCNC: 5 NG/L (ref 0–51)
URINE CULTURE IF INDICATED: ABNORMAL
UROBILINOGEN UR QL STRIP.AUTO: 1 EU/DL (ref 0.2–1)
WBC # BLD AUTO: 9 K/UL (ref 3.6–11)
WBC URNS QL MICRO: ABNORMAL /HPF (ref 0–4)

## 2023-07-16 PROCEDURE — 71046 X-RAY EXAM CHEST 2 VIEWS: CPT

## 2023-07-16 PROCEDURE — 96374 THER/PROPH/DIAG INJ IV PUSH: CPT

## 2023-07-16 PROCEDURE — 84484 ASSAY OF TROPONIN QUANT: CPT

## 2023-07-16 PROCEDURE — 81001 URINALYSIS AUTO W/SCOPE: CPT

## 2023-07-16 PROCEDURE — 36415 COLL VENOUS BLD VENIPUNCTURE: CPT

## 2023-07-16 PROCEDURE — 80053 COMPREHEN METABOLIC PANEL: CPT

## 2023-07-16 PROCEDURE — 81025 URINE PREGNANCY TEST: CPT

## 2023-07-16 PROCEDURE — 99285 EMERGENCY DEPT VISIT HI MDM: CPT

## 2023-07-16 PROCEDURE — 6360000002 HC RX W HCPCS: Performed by: PHYSICIAN ASSISTANT

## 2023-07-16 PROCEDURE — 85025 COMPLETE CBC W/AUTO DIFF WBC: CPT

## 2023-07-16 RX ORDER — KETOROLAC TROMETHAMINE 30 MG/ML
15 INJECTION, SOLUTION INTRAMUSCULAR; INTRAVENOUS ONCE
Status: COMPLETED | OUTPATIENT
Start: 2023-07-16 | End: 2023-07-16

## 2023-07-16 RX ADMIN — KETOROLAC TROMETHAMINE 15 MG: 30 INJECTION, SOLUTION INTRAMUSCULAR; INTRAVENOUS at 21:15

## 2023-07-16 ASSESSMENT — PAIN - FUNCTIONAL ASSESSMENT: PAIN_FUNCTIONAL_ASSESSMENT: NONE - DENIES PAIN

## 2023-07-16 ASSESSMENT — PAIN SCALES - GENERAL: PAINLEVEL_OUTOF10: 6

## 2023-07-16 ASSESSMENT — PAIN DESCRIPTION - LOCATION: LOCATION: CHEST

## 2023-07-17 LAB
EKG ATRIAL RATE: 64 BPM
EKG DIAGNOSIS: NORMAL
EKG P AXIS: 27 DEGREES
EKG P-R INTERVAL: 152 MS
EKG Q-T INTERVAL: 398 MS
EKG QRS DURATION: 68 MS
EKG QTC CALCULATION (BAZETT): 410 MS
EKG R AXIS: 55 DEGREES
EKG T AXIS: 49 DEGREES
EKG VENTRICULAR RATE: 64 BPM

## 2023-07-17 NOTE — ED TRIAGE NOTES
C/o lower back pressure, short urination time, and dark urine since Thursday. Concerned for UTI    Pt also reports pain in her esophagus that she noticed this morning. Pt reports pain worse with movement. Denies pain now.     C/o mild headache - describes it as a mild pressure

## 2023-07-17 NOTE — ED NOTES
Discharge instructions provided. Pt was given copy of discharge instructions with 0 paper script(s) and 0 electronic script(s). Pt verbalized understanding of the  importance of following up as recommended by EDP. Pt has no further questions at this time. Wheelchair offered from treatment area to hospital entrance, pt declined. Pt leaving ED ambulatory and in stable condition.         Jose L Stiles RN  07/16/23 0930

## 2023-07-17 NOTE — ED PROVIDER NOTES
[facility] EMERGENCY DEPT  EMERGENCY DEPARTMENT ENCOUNTER         Pt Name: Hood Reyes  MRN: [id number]  [address] [facility] 1995  Date of evaluation: 7/16/2023  Provider: Nel Zarate PA-C   PCP: None None  Note Started: 9:00 PM EDT 7/16/23     CHIEF COMPLAINT       Chief Complaint   Patient presents with    Urinary Tract Infection    Headache        HISTORY OF PRESENT ILLNESS: 1 or more elements      History From: Patient  HPI Limitations: None     Hood Reyes is a 29 y.o. female who presents multiple complaints of concern for UTI, chest pain and headache for the last 2 to 3 days. Patient states she was recently on Flagyl for BV and thinks that it has now caused her a UTI she has been having some urinary frequency and urgency with back pain. She also states that she has been getting daily headaches for the last 2 to 3 days but thinks that that may be attributed to a human work environment for the past week. She also states she started having midsternal chest pain/discomfort this morning. She states pain in the chest is exacerbated with movement. She does note some pain with inspiration but denies any recent URI symptoms. Nursing Notes were all reviewed and agreed with or any disagreements were addressed in the HPI. REVIEW OF SYSTEMS      Review of Systems     Positives and Pertinent negatives as per HPI. PAST HISTORY     Past Medical History:  Past Medical History:   Diagnosis Date    Ill-defined condition     hx dislocated shoulder       Past Surgical History:  No past surgical history on file.     Family History:  Family History   Problem Relation Age of Onset    Hypertension Father     Diabetes Mother        Social History:  Social History     Tobacco Use    Smoking status: Never    Smokeless tobacco: Never   Substance Use Topics    Alcohol use: Yes    Drug use: Yes     Types: Marijuana (Weed)       Allergies:  No Known Allergies    CURRENT MEDICATIONS      Previous Medications    ACETAMINOPHEN

## 2023-08-06 ENCOUNTER — HOSPITAL ENCOUNTER (EMERGENCY)
Facility: HOSPITAL | Age: 28
Discharge: HOME | End: 2023-08-06
Payer: MEDICAID

## 2023-08-06 VITALS
DIASTOLIC BLOOD PRESSURE: 83 MMHG | SYSTOLIC BLOOD PRESSURE: 112 MMHG | RESPIRATION RATE: 22 BRPM | OXYGEN SATURATION: 99 % | HEIGHT: 62 IN | HEART RATE: 65 BPM | WEIGHT: 195 LBS | TEMPERATURE: 98.6 F | BODY MASS INDEX: 35.88 KG/M2

## 2023-08-06 DIAGNOSIS — R10.9 ABDOMINAL CRAMPING: ICD-10-CM

## 2023-08-06 DIAGNOSIS — G44.209 ACUTE NON INTRACTABLE TENSION-TYPE HEADACHE: Primary | ICD-10-CM

## 2023-08-06 LAB
ALBUMIN SERPL-MCNC: 3.6 G/DL (ref 3.5–5)
ALBUMIN/GLOB SERPL: 0.9 (ref 1.1–2.2)
ALP SERPL-CCNC: 55 U/L (ref 45–117)
ALT SERPL-CCNC: 30 U/L (ref 12–78)
ANION GAP SERPL CALC-SCNC: 5 MMOL/L (ref 5–15)
APPEARANCE UR: ABNORMAL
AST SERPL-CCNC: 23 U/L (ref 15–37)
BACTERIA URNS QL MICRO: NEGATIVE /HPF
BASOPHILS # BLD: 0 K/UL (ref 0–0.1)
BASOPHILS NFR BLD: 0 % (ref 0–1)
BILIRUB SERPL-MCNC: 0.3 MG/DL (ref 0.2–1)
BILIRUB UR QL: NEGATIVE
BUN SERPL-MCNC: 13 MG/DL (ref 6–20)
BUN/CREAT SERPL: 12 (ref 12–20)
CALCIUM SERPL-MCNC: 8.5 MG/DL (ref 8.5–10.1)
CHLORIDE SERPL-SCNC: 103 MMOL/L (ref 97–108)
CO2 SERPL-SCNC: 32 MMOL/L (ref 21–32)
COLOR UR: ABNORMAL
CREAT SERPL-MCNC: 1.1 MG/DL (ref 0.55–1.02)
DIFFERENTIAL METHOD BLD: ABNORMAL
EOSINOPHIL # BLD: 0.1 K/UL (ref 0–0.4)
EOSINOPHIL NFR BLD: 1 % (ref 0–7)
EPITH CASTS URNS QL MICRO: ABNORMAL /LPF
ERYTHROCYTE [DISTWIDTH] IN BLOOD BY AUTOMATED COUNT: 12.3 % (ref 11.5–14.5)
GLOBULIN SER CALC-MCNC: 3.9 G/DL (ref 2–4)
GLUCOSE SERPL-MCNC: 88 MG/DL (ref 65–100)
GLUCOSE UR STRIP.AUTO-MCNC: NEGATIVE MG/DL
HCG UR QL: NEGATIVE
HCT VFR BLD AUTO: 40 % (ref 35–47)
HGB BLD-MCNC: 13.4 G/DL (ref 11.5–16)
HGB UR QL STRIP: ABNORMAL
IMM GRANULOCYTES # BLD AUTO: 0 K/UL
IMM GRANULOCYTES NFR BLD AUTO: 0 %
KETONES UR QL STRIP.AUTO: ABNORMAL MG/DL
LEUKOCYTE ESTERASE UR QL STRIP.AUTO: ABNORMAL
LIPASE SERPL-CCNC: 114 U/L (ref 73–393)
LYMPHOCYTES # BLD: 2.4 K/UL (ref 0.8–3.5)
LYMPHOCYTES NFR BLD: 37 % (ref 12–49)
MCH RBC QN AUTO: 31.9 PG (ref 26–34)
MCHC RBC AUTO-ENTMCNC: 33.5 G/DL (ref 30–36.5)
MCV RBC AUTO: 95.2 FL (ref 80–99)
MONOCYTES # BLD: 0.5 K/UL (ref 0–1)
MONOCYTES NFR BLD: 8 % (ref 5–13)
NEUTS SEG # BLD: 3.5 K/UL (ref 1.8–8)
NEUTS SEG NFR BLD: 54 % (ref 32–75)
NITRITE UR QL STRIP.AUTO: NEGATIVE
NRBC # BLD: 0 K/UL (ref 0–0.01)
NRBC BLD-RTO: 0 PER 100 WBC
PH UR STRIP: 5.5 (ref 5–8)
PLATELET # BLD AUTO: 412 K/UL (ref 150–400)
PMV BLD AUTO: 8.6 FL (ref 8.9–12.9)
POTASSIUM SERPL-SCNC: 3.9 MMOL/L (ref 3.5–5.1)
PROT SERPL-MCNC: 7.5 G/DL (ref 6.4–8.2)
PROT UR STRIP-MCNC: NEGATIVE MG/DL
RBC # BLD AUTO: 4.2 M/UL (ref 3.8–5.2)
RBC #/AREA URNS HPF: ABNORMAL /HPF (ref 0–5)
RBC MORPH BLD: ABNORMAL
SODIUM SERPL-SCNC: 140 MMOL/L (ref 136–145)
SP GR UR REFRACTOMETRY: 1.02
URINE CULTURE IF INDICATED: ABNORMAL
UROBILINOGEN UR QL STRIP.AUTO: 1 EU/DL (ref 0.2–1)
WBC # BLD AUTO: 6.5 K/UL (ref 3.6–11)
WBC URNS QL MICRO: ABNORMAL /HPF (ref 0–4)

## 2023-08-06 PROCEDURE — 96374 THER/PROPH/DIAG INJ IV PUSH: CPT

## 2023-08-06 PROCEDURE — 85025 COMPLETE CBC W/AUTO DIFF WBC: CPT

## 2023-08-06 PROCEDURE — 2580000003 HC RX 258: Performed by: PHYSICIAN ASSISTANT

## 2023-08-06 PROCEDURE — 81025 URINE PREGNANCY TEST: CPT

## 2023-08-06 PROCEDURE — 36415 COLL VENOUS BLD VENIPUNCTURE: CPT

## 2023-08-06 PROCEDURE — 6360000002 HC RX W HCPCS: Performed by: PHYSICIAN ASSISTANT

## 2023-08-06 PROCEDURE — 6370000000 HC RX 637 (ALT 250 FOR IP): Performed by: PHYSICIAN ASSISTANT

## 2023-08-06 PROCEDURE — 99284 EMERGENCY DEPT VISIT MOD MDM: CPT

## 2023-08-06 PROCEDURE — 83690 ASSAY OF LIPASE: CPT

## 2023-08-06 PROCEDURE — 81001 URINALYSIS AUTO W/SCOPE: CPT

## 2023-08-06 PROCEDURE — 80053 COMPREHEN METABOLIC PANEL: CPT

## 2023-08-06 RX ORDER — 0.9 % SODIUM CHLORIDE 0.9 %
1000 INTRAVENOUS SOLUTION INTRAVENOUS ONCE
Status: COMPLETED | OUTPATIENT
Start: 2023-08-06 | End: 2023-08-06

## 2023-08-06 RX ORDER — KETOROLAC TROMETHAMINE 30 MG/ML
15 INJECTION, SOLUTION INTRAMUSCULAR; INTRAVENOUS
Status: COMPLETED | OUTPATIENT
Start: 2023-08-06 | End: 2023-08-06

## 2023-08-06 RX ORDER — ACETAMINOPHEN 500 MG
1000 TABLET ORAL EVERY 6 HOURS PRN
Qty: 20 TABLET | Refills: 0 | Status: SHIPPED | OUTPATIENT
Start: 2023-08-06

## 2023-08-06 RX ORDER — BUTALBITAL, ACETAMINOPHEN AND CAFFEINE 50; 325; 40 MG/1; MG/1; MG/1
1 TABLET ORAL
Status: COMPLETED | OUTPATIENT
Start: 2023-08-06 | End: 2023-08-06

## 2023-08-06 RX ADMIN — KETOROLAC TROMETHAMINE 15 MG: 30 INJECTION, SOLUTION INTRAMUSCULAR; INTRAVENOUS at 14:10

## 2023-08-06 RX ADMIN — SODIUM CHLORIDE 1000 ML: 9 INJECTION, SOLUTION INTRAVENOUS at 14:06

## 2023-08-06 RX ADMIN — BUTALBITAL, ACETAMINOPHEN AND CAFFEINE 1 TABLET: 325; 50; 40 TABLET ORAL at 14:08

## 2023-08-06 ASSESSMENT — ENCOUNTER SYMPTOMS
SORE THROAT: 0
COUGH: 0
RECTAL PAIN: 0
WHEEZING: 0
DIARRHEA: 0
ABDOMINAL PAIN: 1
RHINORRHEA: 0
ABDOMINAL DISTENTION: 0
EYE PAIN: 0
CHEST TIGHTNESS: 0
VOMITING: 0
BLOOD IN STOOL: 0
BACK PAIN: 0
NAUSEA: 0
SHORTNESS OF BREATH: 0
ANAL BLEEDING: 0
CONSTIPATION: 0

## 2023-08-06 ASSESSMENT — PAIN DESCRIPTION - DESCRIPTORS
DESCRIPTORS: ACHING
DESCRIPTORS: ACHING;SORE
DESCRIPTORS: ACHING

## 2023-08-06 ASSESSMENT — PAIN SCALES - GENERAL
PAINLEVEL_OUTOF10: 8
PAINLEVEL_OUTOF10: 8
PAINLEVEL_OUTOF10: 5

## 2023-08-06 ASSESSMENT — PAIN DESCRIPTION - LOCATION
LOCATION: HEAD
LOCATION: ABDOMEN;HEAD
LOCATION: ABDOMEN;HEAD

## 2023-08-06 ASSESSMENT — PAIN - FUNCTIONAL ASSESSMENT: PAIN_FUNCTIONAL_ASSESSMENT: 0-10

## 2023-08-06 ASSESSMENT — LIFESTYLE VARIABLES: HOW OFTEN DO YOU HAVE A DRINK CONTAINING ALCOHOL: MONTHLY OR LESS

## 2023-08-06 NOTE — ED NOTES
Care provided by DALILA Hart RN performed under my direction and supervision     Cory Stern RN  08/06/23 5219

## 2023-08-30 ENCOUNTER — HOSPITAL ENCOUNTER (EMERGENCY)
Facility: HOSPITAL | Age: 28
Discharge: HOME | End: 2023-08-30
Attending: EMERGENCY MEDICINE
Payer: MEDICAID

## 2023-08-30 VITALS
SYSTOLIC BLOOD PRESSURE: 142 MMHG | TEMPERATURE: 97.2 F | WEIGHT: 200 LBS | RESPIRATION RATE: 18 BRPM | BODY MASS INDEX: 36.8 KG/M2 | HEIGHT: 62 IN | HEART RATE: 79 BPM | DIASTOLIC BLOOD PRESSURE: 64 MMHG | OXYGEN SATURATION: 97 %

## 2023-08-30 DIAGNOSIS — N76.0 BACTERIAL VAGINOSIS: Primary | ICD-10-CM

## 2023-08-30 DIAGNOSIS — B96.89 BACTERIAL VAGINOSIS: Primary | ICD-10-CM

## 2023-08-30 DIAGNOSIS — K52.9 GASTROENTERITIS: ICD-10-CM

## 2023-08-30 LAB
APPEARANCE UR: CLEAR
BACTERIA URNS QL MICRO: ABNORMAL /HPF
BILIRUB UR QL: NEGATIVE
CLUE CELLS VAG QL WET PREP: NORMAL
COLOR UR: ABNORMAL
EPITH CASTS URNS QL MICRO: ABNORMAL /LPF
GLUCOSE UR STRIP.AUTO-MCNC: NEGATIVE MG/DL
HCG UR QL: NEGATIVE
HGB UR QL STRIP: ABNORMAL
KETONES UR QL STRIP.AUTO: ABNORMAL MG/DL
KOH PREP SPEC: NORMAL
LEUKOCYTE ESTERASE UR QL STRIP.AUTO: ABNORMAL
NITRITE UR QL STRIP.AUTO: NEGATIVE
PH UR STRIP: 5.5 (ref 5–8)
PROT UR STRIP-MCNC: NEGATIVE MG/DL
RBC #/AREA URNS HPF: ABNORMAL /HPF (ref 0–5)
SERVICE CMNT-IMP: NORMAL
SP GR UR REFRACTOMETRY: 1.02
T VAGINALIS VAG QL WET PREP: NORMAL
UROBILINOGEN UR QL STRIP.AUTO: 0.2 EU/DL (ref 0.2–1)
WBC URNS QL MICRO: ABNORMAL /HPF (ref 0–4)

## 2023-08-30 PROCEDURE — 99284 EMERGENCY DEPT VISIT MOD MDM: CPT

## 2023-08-30 PROCEDURE — 87491 CHLMYD TRACH DNA AMP PROBE: CPT

## 2023-08-30 PROCEDURE — 96372 THER/PROPH/DIAG INJ SC/IM: CPT

## 2023-08-30 PROCEDURE — 87591 N.GONORRHOEAE DNA AMP PROB: CPT

## 2023-08-30 PROCEDURE — 2500000003 HC RX 250 WO HCPCS: Performed by: EMERGENCY MEDICINE

## 2023-08-30 PROCEDURE — 87210 SMEAR WET MOUNT SALINE/INK: CPT

## 2023-08-30 PROCEDURE — 6370000000 HC RX 637 (ALT 250 FOR IP): Performed by: EMERGENCY MEDICINE

## 2023-08-30 PROCEDURE — 81025 URINE PREGNANCY TEST: CPT

## 2023-08-30 PROCEDURE — 6360000002 HC RX W HCPCS: Performed by: EMERGENCY MEDICINE

## 2023-08-30 PROCEDURE — 81001 URINALYSIS AUTO W/SCOPE: CPT

## 2023-08-30 RX ORDER — METRONIDAZOLE 500 MG/1
500 TABLET ORAL 2 TIMES DAILY
Qty: 14 TABLET | Refills: 0 | Status: SHIPPED | OUTPATIENT
Start: 2023-08-30 | End: 2023-09-06

## 2023-08-30 RX ORDER — AZITHROMYCIN 500 MG/1
1000 TABLET, FILM COATED ORAL
Status: COMPLETED | OUTPATIENT
Start: 2023-08-30 | End: 2023-08-30

## 2023-08-30 RX ORDER — LOPERAMIDE HYDROCHLORIDE 2 MG/1
2 CAPSULE ORAL 4 TIMES DAILY PRN
Qty: 16 CAPSULE | Refills: 0 | Status: SHIPPED | OUTPATIENT
Start: 2023-08-30 | End: 2023-09-09

## 2023-08-30 RX ORDER — LOPERAMIDE HYDROCHLORIDE 2 MG/1
4 CAPSULE ORAL 4 TIMES DAILY PRN
Status: DISCONTINUED | OUTPATIENT
Start: 2023-08-30 | End: 2023-08-30 | Stop reason: HOSPADM

## 2023-08-30 RX ORDER — ONDANSETRON 4 MG/1
4 TABLET, ORALLY DISINTEGRATING ORAL EVERY 8 HOURS PRN
Status: DISCONTINUED | OUTPATIENT
Start: 2023-08-30 | End: 2023-08-30 | Stop reason: HOSPADM

## 2023-08-30 RX ORDER — DICYCLOMINE HCL 20 MG
20 TABLET ORAL EVERY 6 HOURS
Qty: 120 TABLET | Refills: 3 | Status: SHIPPED | OUTPATIENT
Start: 2023-08-30

## 2023-08-30 RX ORDER — DICYCLOMINE HYDROCHLORIDE 10 MG/1
20 CAPSULE ORAL
Status: COMPLETED | OUTPATIENT
Start: 2023-08-30 | End: 2023-08-30

## 2023-08-30 RX ORDER — ONDANSETRON 4 MG/1
4 TABLET, ORALLY DISINTEGRATING ORAL 3 TIMES DAILY PRN
Qty: 21 TABLET | Refills: 0 | Status: SHIPPED | OUTPATIENT
Start: 2023-08-30

## 2023-08-30 RX ADMIN — LOPERAMIDE HYDROCHLORIDE 4 MG: 2 CAPSULE ORAL at 19:36

## 2023-08-30 RX ADMIN — DICYCLOMINE HYDROCHLORIDE 20 MG: 10 CAPSULE ORAL at 19:36

## 2023-08-30 RX ADMIN — AZITHROMYCIN 1000 MG: 500 TABLET, FILM COATED ORAL at 19:36

## 2023-08-30 RX ADMIN — ONDANSETRON 4 MG: 4 TABLET, ORALLY DISINTEGRATING ORAL at 19:37

## 2023-08-30 RX ADMIN — LIDOCAINE HYDROCHLORIDE 500 MG: 10 INJECTION, SOLUTION EPIDURAL; INFILTRATION; INTRACAUDAL; PERINEURAL at 19:35

## 2023-08-30 ASSESSMENT — PAIN - FUNCTIONAL ASSESSMENT
PAIN_FUNCTIONAL_ASSESSMENT: 0-10
PAIN_FUNCTIONAL_ASSESSMENT: NONE - DENIES PAIN

## 2023-08-30 ASSESSMENT — PAIN DESCRIPTION - LOCATION: LOCATION: ABDOMEN

## 2023-08-30 ASSESSMENT — PAIN DESCRIPTION - DESCRIPTORS: DESCRIPTORS: CRAMPING

## 2023-08-30 ASSESSMENT — PAIN DESCRIPTION - ORIENTATION: ORIENTATION: LOWER;UPPER

## 2023-08-30 ASSESSMENT — PAIN SCALES - GENERAL: PAINLEVEL_OUTOF10: 8

## 2023-08-30 NOTE — ED PROVIDER NOTES
[facility] EMERGENCY DEPT  EMERGENCY DEPARTMENT ENCOUNTER       Pt Name: Ivis Salazar  MRN: [id number]  [address] [facility] 1995  Date of evaluation: 8/30/2023  Provider: Raegan Carter MD   PCP: None None  Note Started: 9:36 PM 8/30/23     CHIEF COMPLAINT       Chief Complaint   Patient presents with    Abdominal Pain    Diarrhea    Sexually Transmitted Diseases        HISTORY OF PRESENT ILLNESS: 1 or more elements      History From: patient, History limited by:  none     Ivis Salazar is a 29 y.o. female who presents ambulatory with complaint of possible stomach virus. Patient works at a  and she states that multiple children and some of her coworkers have had a stomach virus. One of her coworkers was out yesterday with it. She is concerned she contracted it because after lunch today she ran straight to the bathroom and had diarrhea. Shortly after that she developed cramping abdominal pain. She feels mildly dizzy and nauseated. Patient also has concern for a vaginal bacterial infection. She just finished her cycle. She notes that before her cycle she was noticing a strong smelling discharge. Would like to be tested for STDs and treated empirically. Denies fever or URI symptoms. Nursing Notes were all reviewed and agreed with or any disagreements were addressed in the HPI. REVIEW OF SYSTEMS        Positives and Pertinent negatives as per HPI. PAST HISTORY     Past Medical History:  Past Medical History:   Diagnosis Date    Ill-defined condition     hx dislocated shoulder       Past Surgical History:  No past surgical history on file.     Family History:  Family History   Problem Relation Age of Onset    Hypertension Father     Diabetes Mother        Social History:  Social History     Tobacco Use    Smoking status: Never    Smokeless tobacco: Never   Substance Use Topics    Alcohol use: Yes    Drug use: Yes     Types: Marijuana Kreg Sjogren)       Allergies:  No Known Allergies    CURRENT MEDICATIONS

## 2023-08-30 NOTE — ED TRIAGE NOTES
Pt arrives complaining of abdominal pain and soft stool that started today. 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 Pt also requesting STD testing.  Pt reports vaginal discharge, vaginal odor, and pelvic pain for the past week

## 2023-08-31 NOTE — ED NOTES
Patient (s)  given copy of dc instructions and 4 script(s). Patient (s)  verbalized understanding of instructions and script (s). Patient given a current medication reconciliation form and verbalized understanding of their medications. Patient (s) verbalized understanding of the importance of discussing medications with his or her physician or clinic they will be following up with. Patient alert and oriented and in no acute distress. Patient discharged home ambulatory with a steady unassisted.       Barbara Gamez RN  08/30/23 3505

## 2023-09-07 ENCOUNTER — HOSPITAL ENCOUNTER (EMERGENCY)
Facility: HOSPITAL | Age: 28
Discharge: HOME | End: 2023-09-07
Payer: MEDICAID

## 2023-09-07 VITALS
WEIGHT: 204 LBS | SYSTOLIC BLOOD PRESSURE: 122 MMHG | RESPIRATION RATE: 18 BRPM | BODY MASS INDEX: 37.54 KG/M2 | HEART RATE: 63 BPM | TEMPERATURE: 97.9 F | HEIGHT: 62 IN | OXYGEN SATURATION: 100 % | DIASTOLIC BLOOD PRESSURE: 89 MMHG

## 2023-09-07 DIAGNOSIS — N76.0 BV (BACTERIAL VAGINOSIS): ICD-10-CM

## 2023-09-07 DIAGNOSIS — R51.9 ACUTE NONINTRACTABLE HEADACHE, UNSPECIFIED HEADACHE TYPE: Primary | ICD-10-CM

## 2023-09-07 DIAGNOSIS — R42 DIZZINESS: ICD-10-CM

## 2023-09-07 DIAGNOSIS — B96.89 BV (BACTERIAL VAGINOSIS): ICD-10-CM

## 2023-09-07 LAB
ALBUMIN SERPL-MCNC: 3.6 G/DL (ref 3.5–5)
ALBUMIN/GLOB SERPL: 0.9 (ref 1.1–2.2)
ALP SERPL-CCNC: 48 U/L (ref 45–117)
ALT SERPL-CCNC: 23 U/L (ref 12–78)
ANION GAP SERPL CALC-SCNC: 7 MMOL/L (ref 5–15)
APPEARANCE UR: CLEAR
AST SERPL-CCNC: 20 U/L (ref 15–37)
BACTERIA URNS QL MICRO: ABNORMAL /HPF
BASOPHILS # BLD: 0 K/UL (ref 0–0.1)
BASOPHILS NFR BLD: 1 % (ref 0–1)
BILIRUB SERPL-MCNC: 0.2 MG/DL (ref 0.2–1)
BILIRUB UR QL: NEGATIVE
BUN SERPL-MCNC: 14 MG/DL (ref 6–20)
BUN/CREAT SERPL: 16 (ref 12–20)
CALCIUM SERPL-MCNC: 8.8 MG/DL (ref 8.5–10.1)
CHLORIDE SERPL-SCNC: 104 MMOL/L (ref 97–108)
CO2 SERPL-SCNC: 28 MMOL/L (ref 21–32)
COLOR UR: ABNORMAL
CREAT SERPL-MCNC: 0.9 MG/DL (ref 0.55–1.02)
DIFFERENTIAL METHOD BLD: ABNORMAL
EOSINOPHIL # BLD: 0.2 K/UL (ref 0–0.4)
EOSINOPHIL NFR BLD: 2 % (ref 0–7)
EPITH CASTS URNS QL MICRO: ABNORMAL /LPF
ERYTHROCYTE [DISTWIDTH] IN BLOOD BY AUTOMATED COUNT: 12.1 % (ref 11.5–14.5)
GLOBULIN SER CALC-MCNC: 3.8 G/DL (ref 2–4)
GLUCOSE SERPL-MCNC: 86 MG/DL (ref 65–100)
GLUCOSE UR STRIP.AUTO-MCNC: NEGATIVE MG/DL
HCG UR QL: NEGATIVE
HCT VFR BLD AUTO: 38.8 % (ref 35–47)
HGB BLD-MCNC: 13.1 G/DL (ref 11.5–16)
HGB UR QL STRIP: NEGATIVE
IMM GRANULOCYTES # BLD AUTO: 0.1 K/UL (ref 0–0.04)
IMM GRANULOCYTES NFR BLD AUTO: 1 % (ref 0–0.5)
KETONES UR QL STRIP.AUTO: ABNORMAL MG/DL
LEUKOCYTE ESTERASE UR QL STRIP.AUTO: ABNORMAL
LYMPHOCYTES # BLD: 3.3 K/UL (ref 0.8–3.5)
LYMPHOCYTES NFR BLD: 38 % (ref 12–49)
MCH RBC QN AUTO: 32 PG (ref 26–34)
MCHC RBC AUTO-ENTMCNC: 33.8 G/DL (ref 30–36.5)
MCV RBC AUTO: 94.9 FL (ref 80–99)
MONOCYTES # BLD: 0.6 K/UL (ref 0–1)
MONOCYTES NFR BLD: 7 % (ref 5–13)
NEUTS SEG # BLD: 4.5 K/UL (ref 1.8–8)
NEUTS SEG NFR BLD: 51 % (ref 32–75)
NITRITE UR QL STRIP.AUTO: NEGATIVE
NRBC # BLD: 0 K/UL (ref 0–0.01)
NRBC BLD-RTO: 0 PER 100 WBC
PH UR STRIP: 5.5 (ref 5–8)
PLATELET # BLD AUTO: 361 K/UL (ref 150–400)
PMV BLD AUTO: 8.6 FL (ref 8.9–12.9)
POTASSIUM SERPL-SCNC: 4.5 MMOL/L (ref 3.5–5.1)
PROT SERPL-MCNC: 7.4 G/DL (ref 6.4–8.2)
PROT UR STRIP-MCNC: NEGATIVE MG/DL
RBC # BLD AUTO: 4.09 M/UL (ref 3.8–5.2)
RBC #/AREA URNS HPF: ABNORMAL /HPF (ref 0–5)
SODIUM SERPL-SCNC: 139 MMOL/L (ref 136–145)
SP GR UR REFRACTOMETRY: 1.02
URINE CULTURE IF INDICATED: ABNORMAL
UROBILINOGEN UR QL STRIP.AUTO: 1 EU/DL (ref 0.2–1)
WBC # BLD AUTO: 8.7 K/UL (ref 3.6–11)
WBC URNS QL MICRO: ABNORMAL /HPF (ref 0–4)

## 2023-09-07 PROCEDURE — 99284 EMERGENCY DEPT VISIT MOD MDM: CPT

## 2023-09-07 PROCEDURE — 80053 COMPREHEN METABOLIC PANEL: CPT

## 2023-09-07 PROCEDURE — 96374 THER/PROPH/DIAG INJ IV PUSH: CPT

## 2023-09-07 PROCEDURE — 36415 COLL VENOUS BLD VENIPUNCTURE: CPT

## 2023-09-07 PROCEDURE — 85025 COMPLETE CBC W/AUTO DIFF WBC: CPT

## 2023-09-07 PROCEDURE — 6360000002 HC RX W HCPCS

## 2023-09-07 PROCEDURE — 81001 URINALYSIS AUTO W/SCOPE: CPT

## 2023-09-07 PROCEDURE — 81025 URINE PREGNANCY TEST: CPT

## 2023-09-07 RX ORDER — KETOROLAC TROMETHAMINE 30 MG/ML
30 INJECTION, SOLUTION INTRAMUSCULAR; INTRAVENOUS ONCE
Status: COMPLETED | OUTPATIENT
Start: 2023-09-07 | End: 2023-09-07

## 2023-09-07 RX ORDER — MECLIZINE HYDROCHLORIDE 25 MG/1
25 TABLET ORAL 3 TIMES DAILY PRN
Qty: 15 TABLET | Refills: 0 | Status: SHIPPED | OUTPATIENT
Start: 2023-09-07 | End: 2023-09-17

## 2023-09-07 RX ORDER — IBUPROFEN 800 MG/1
800 TABLET ORAL 2 TIMES DAILY PRN
Qty: 20 TABLET | Refills: 0 | Status: SHIPPED | OUTPATIENT
Start: 2023-09-07 | End: 2023-09-17

## 2023-09-07 RX ORDER — METRONIDAZOLE 7.5 MG/G
GEL VAGINAL
Qty: 70 G | Refills: 0 | Status: SHIPPED | OUTPATIENT
Start: 2023-09-07 | End: 2023-09-14

## 2023-09-07 RX ADMIN — KETOROLAC TROMETHAMINE 30 MG: 30 INJECTION, SOLUTION INTRAMUSCULAR; INTRAVENOUS at 18:09

## 2023-09-07 ASSESSMENT — ENCOUNTER SYMPTOMS
NAUSEA: 0
VOMITING: 0
ABDOMINAL PAIN: 0
SINUS PRESSURE: 0
SHORTNESS OF BREATH: 0
SORE THROAT: 0
WHEEZING: 0
CHEST TIGHTNESS: 0
RHINORRHEA: 0
SINUS PAIN: 0

## 2023-09-07 ASSESSMENT — PAIN DESCRIPTION - DESCRIPTORS
DESCRIPTORS: THROBBING
DESCRIPTORS: ACHING

## 2023-09-07 ASSESSMENT — PAIN SCALES - GENERAL
PAINLEVEL_OUTOF10: 7
PAINLEVEL_OUTOF10: 8

## 2023-09-07 ASSESSMENT — PAIN DESCRIPTION - LOCATION
LOCATION: HEAD
LOCATION: HEAD

## 2023-09-07 ASSESSMENT — PAIN DESCRIPTION - ORIENTATION
ORIENTATION: OTHER (COMMENT)
ORIENTATION: ANTERIOR;LEFT

## 2023-09-07 ASSESSMENT — PAIN DESCRIPTION - PAIN TYPE: TYPE: ACUTE PAIN

## 2023-09-07 ASSESSMENT — PAIN - FUNCTIONAL ASSESSMENT: PAIN_FUNCTIONAL_ASSESSMENT: 0-10

## 2023-09-07 ASSESSMENT — LIFESTYLE VARIABLES
HOW MANY STANDARD DRINKS CONTAINING ALCOHOL DO YOU HAVE ON A TYPICAL DAY: 1 OR 2
HOW OFTEN DO YOU HAVE A DRINK CONTAINING ALCOHOL: MONTHLY OR LESS

## 2023-09-07 NOTE — ED NOTES

## 2023-09-07 NOTE — ED PROVIDER NOTES
[facility] EMERGENCY DEPT  EMERGENCY DEPARTMENT ENCOUNTER       Pt Name: Emily Garrison  MRN: [id number]  [address] [facility] 1995  Date of evaluation: 9/7/2023  Provider: TJ Jaime CNP   PCP: None None  Note Started:  5:02 PM EDT 9/7/23     CHIEF COMPLAINT       Chief Complaint   Patient presents with    Dizziness     Per pt reports dizziness and headache x 1 week after taking Flagyl, denies chest pain and sob. HISTORY OF PRESENT ILLNESS: 1 or more elements      History From: Patient  HPI Limitations: None     Emily Garrison is a 29 y.o. female who presents with headache and female complaining of frontal headache and dizziness x3 days after she started Flagyl for BV. Reports having taken 3 days of the medication. Also reports urinary frequency. Rates the headache a 7 out of 10. Patient denies photophobia, vision changes, nausea, vomiting, chills, fever, abdominal pain, low back pain, generalized myalgia. Denies neurologic changes including difficulty swallowing, coughing after eating, unsteady gait, or unilateral weakness. Patient recently seen in this ED for gastroenteritis and was discharged home on dicyclomine, loperamide, and Zofran which she stopped taking before she started Flagyl. Patient denies any decreased oral intake. Reports history of anemia. Patient with a history of recurrent BV.     nursing Notes were all reviewed and agreed with or any disagreements were addressed in the HPI. REVIEW OF SYSTEMS      Review of Systems   Constitutional:  Negative for activity change, chills, fatigue and fever. HENT:  Negative for congestion, ear discharge, ear pain, hearing loss, postnasal drip, rhinorrhea, sinus pressure, sinus pain, sore throat and tinnitus. Respiratory:  Negative for chest tightness, shortness of breath and wheezing. Cardiovascular:  Negative for chest pain. Gastrointestinal:  Negative for abdominal pain, nausea and vomiting.    Genitourinary:  Positive for

## 2023-11-18 ENCOUNTER — HOSPITAL ENCOUNTER (EMERGENCY)
Facility: HOSPITAL | Age: 28
Discharge: HOME | End: 2023-11-18
Attending: STUDENT IN AN ORGANIZED HEALTH CARE EDUCATION/TRAINING PROGRAM
Payer: MEDICAID

## 2023-11-18 VITALS
TEMPERATURE: 98.6 F | OXYGEN SATURATION: 98 % | DIASTOLIC BLOOD PRESSURE: 86 MMHG | RESPIRATION RATE: 16 BRPM | HEART RATE: 81 BPM | WEIGHT: 213 LBS | HEIGHT: 62 IN | SYSTOLIC BLOOD PRESSURE: 125 MMHG | BODY MASS INDEX: 39.2 KG/M2

## 2023-11-18 DIAGNOSIS — R20.2 PARESTHESIA: Primary | ICD-10-CM

## 2023-11-18 DIAGNOSIS — R10.13 ABDOMINAL PAIN, EPIGASTRIC: ICD-10-CM

## 2023-11-18 LAB
APPEARANCE UR: CLEAR
BACTERIA URNS QL MICRO: NEGATIVE /HPF
BILIRUB UR QL: NEGATIVE
CLUE CELLS VAG QL WET PREP: NORMAL
COLOR UR: ABNORMAL
EPITH CASTS URNS QL MICRO: ABNORMAL /LPF
GLUCOSE UR STRIP.AUTO-MCNC: NEGATIVE MG/DL
HCG UR QL: NEGATIVE
HGB UR QL STRIP: NEGATIVE
KETONES UR QL STRIP.AUTO: NEGATIVE MG/DL
KOH PREP SPEC: NORMAL
LEUKOCYTE ESTERASE UR QL STRIP.AUTO: ABNORMAL
NITRITE UR QL STRIP.AUTO: NEGATIVE
PH UR STRIP: 5.5 (ref 5–8)
PROT UR STRIP-MCNC: NEGATIVE MG/DL
RBC #/AREA URNS HPF: ABNORMAL /HPF (ref 0–5)
SERVICE CMNT-IMP: NORMAL
SP GR UR REFRACTOMETRY: 1.02
T VAGINALIS VAG QL WET PREP: NORMAL
URINE CULTURE IF INDICATED: ABNORMAL
UROBILINOGEN UR QL STRIP.AUTO: 0.2 EU/DL (ref 0.2–1)
WBC URNS QL MICRO: ABNORMAL /HPF (ref 0–4)

## 2023-11-18 PROCEDURE — 87591 N.GONORRHOEAE DNA AMP PROB: CPT

## 2023-11-18 PROCEDURE — 81001 URINALYSIS AUTO W/SCOPE: CPT

## 2023-11-18 PROCEDURE — 99283 EMERGENCY DEPT VISIT LOW MDM: CPT

## 2023-11-18 PROCEDURE — 81025 URINE PREGNANCY TEST: CPT

## 2023-11-18 PROCEDURE — 87210 SMEAR WET MOUNT SALINE/INK: CPT

## 2023-11-18 PROCEDURE — 87491 CHLMYD TRACH DNA AMP PROBE: CPT

## 2023-11-18 RX ORDER — IBUPROFEN 600 MG/1
600 TABLET ORAL
Status: DISCONTINUED | OUTPATIENT
Start: 2023-11-18 | End: 2023-11-18 | Stop reason: HOSPADM

## 2023-11-18 RX ORDER — IBUPROFEN 600 MG/1
600 TABLET ORAL 3 TIMES DAILY PRN
Qty: 30 TABLET | Refills: 0 | Status: SHIPPED | OUTPATIENT
Start: 2023-11-18

## 2023-11-18 RX ORDER — FAMOTIDINE 20 MG/1
20 TABLET, FILM COATED ORAL DAILY
Qty: 30 TABLET | Refills: 0 | Status: SHIPPED | OUTPATIENT
Start: 2023-11-18

## 2023-11-18 ASSESSMENT — PAIN - FUNCTIONAL ASSESSMENT: PAIN_FUNCTIONAL_ASSESSMENT: 0-10

## 2023-11-18 ASSESSMENT — PAIN SCALES - GENERAL: PAINLEVEL_OUTOF10: 7

## 2023-11-18 ASSESSMENT — PAIN DESCRIPTION - ORIENTATION: ORIENTATION: RIGHT;LEFT;LOWER;UPPER

## 2023-11-18 ASSESSMENT — PAIN DESCRIPTION - PAIN TYPE: TYPE: ACUTE PAIN

## 2023-11-18 ASSESSMENT — PAIN DESCRIPTION - DESCRIPTORS: DESCRIPTORS: SORE

## 2023-11-18 ASSESSMENT — PAIN DESCRIPTION - LOCATION: LOCATION: ABDOMEN

## 2023-11-18 NOTE — ED NOTES
Discharge instructions were given to the patient by [facility], RN. The patient left the Emergency Department ambulatory, alert and oriented and in no acute distress with 2 prescriptions. The patient was encouraged to call or return to the ED for worsening issues or problems and was encouraged to schedule a follow up appointment for continuing care. The patient verbalized understanding of discharge instructions and prescriptions, all questions were answered. The patient has no further concerns at this time.         Austin Dougherty RN  11/18/23 5964

## 2023-11-18 NOTE — ED PROVIDER NOTES
EMERGENCY DEPARTMENT HISTORY AND PHYSICAL EXAM      Date: 11/18/2023  Patient Name: Leland Pineda    History of Presenting Illness     Chief Complaint   Patient presents with    Abdominal Pain    Numbness    Leg Pain         HPI: History From: patient, History limited by: none  Leland Pineda, 29 y.o. female presents to the ED with cc of left leg discomfort. This has been an issue on and off since Thursday, she describes it as pins and needle sensation of the left foot, and on and off numbness of the left leg. She denies any swelling of the left leg. She denies any fevers. She also states that she has been having some crampy epigastric pain radiating to the left upper quadrant for the past week. No vomiting, diarrhea or fevers. She also states that she would like to be tested for all STDs, she has been having some urinary urgency and frequency. Reports a history of BV previously as well as yeast infection previously. There are no other complaints, changes, or physical findings at this time. PCP: None, None    No current facility-administered medications on file prior to encounter. Current Outpatient Medications on File Prior to Encounter   Medication Sig Dispense Refill    ibuprofen (ADVIL;MOTRIN) 800 MG tablet Take 1 tablet by mouth 2 times daily as needed for Pain 20 tablet 0    dicyclomine (BENTYL) 20 MG tablet Take 1 tablet by mouth in the morning and 1 tablet at noon and 1 tablet in the evening and 1 tablet before bedtime.  120 tablet 3    ondansetron (ZOFRAN-ODT) 4 MG disintegrating tablet Take 1 tablet by mouth 3 times daily as needed for Nausea or Vomiting 21 tablet 0    acetaminophen (TYLENOL) 500 MG tablet Take 2 tablets by mouth every 6 hours as needed for Pain 20 tablet 0    acyclovir (ZOVIRAX) 400 MG tablet Take 1 tablet by mouth 2 times daily      butalbital-acetaminophen-caffeine (FIORICET, ESGIC) -40 MG per tablet Take 1 tablet by mouth every 6 hours as needed

## 2023-11-18 NOTE — ED TRIAGE NOTES
Pt presents to the ED c/o diffuse abdominal pain x 5 days. Pt denies n/v/d. Pt reports she also feels \"something pulling down in my pelvic area. \"    Pt reports she  has pain and numbness in the left foot that radiates up the left leg x 3 days. Pt reports a \"tension knot\" in the right calf. Pt states it \"feels like I have a gilma horse that won't go away. \"    Pt has multiple complaints. Pt was concerned she had Toxic Shock Syndrome. Pt denies tampon or other foreign objects in body. Pt appears anxious.

## 2024-01-23 ENCOUNTER — APPOINTMENT (OUTPATIENT)
Facility: HOSPITAL | Age: 29
End: 2024-01-23
Payer: MEDICAID

## 2024-01-23 ENCOUNTER — HOSPITAL ENCOUNTER (EMERGENCY)
Facility: HOSPITAL | Age: 29
Discharge: HOME | End: 2024-01-23
Payer: MEDICAID

## 2024-01-23 VITALS
BODY MASS INDEX: 39.38 KG/M2 | DIASTOLIC BLOOD PRESSURE: 80 MMHG | TEMPERATURE: 97.1 F | SYSTOLIC BLOOD PRESSURE: 135 MMHG | OXYGEN SATURATION: 98 % | HEIGHT: 62 IN | WEIGHT: 214 LBS | HEART RATE: 80 BPM | RESPIRATION RATE: 12 BRPM

## 2024-01-23 DIAGNOSIS — K59.00 CONSTIPATION, UNSPECIFIED CONSTIPATION TYPE: Primary | ICD-10-CM

## 2024-01-23 LAB
ALBUMIN SERPL-MCNC: 3.6 G/DL (ref 3.5–5)
ALBUMIN/GLOB SERPL: 0.9 (ref 1.1–2.2)
ALP SERPL-CCNC: 49 U/L (ref 45–117)
ALT SERPL-CCNC: 34 U/L (ref 12–78)
ANION GAP SERPL CALC-SCNC: 7 MMOL/L (ref 5–15)
APPEARANCE UR: CLEAR
AST SERPL-CCNC: 34 U/L (ref 15–37)
BACTERIA URNS QL MICRO: NEGATIVE /HPF
BASOPHILS # BLD: 0 K/UL (ref 0–0.1)
BASOPHILS NFR BLD: 0 % (ref 0–1)
BILIRUB SERPL-MCNC: 0.1 MG/DL (ref 0.2–1)
BILIRUB UR QL: NEGATIVE
BUN SERPL-MCNC: 16 MG/DL (ref 6–20)
BUN/CREAT SERPL: 19 (ref 12–20)
CALCIUM SERPL-MCNC: 8.8 MG/DL (ref 8.5–10.1)
CHLORIDE SERPL-SCNC: 103 MMOL/L (ref 97–108)
CO2 SERPL-SCNC: 29 MMOL/L (ref 21–32)
COLOR UR: NORMAL
CREAT SERPL-MCNC: 0.85 MG/DL (ref 0.55–1.02)
DIFFERENTIAL METHOD BLD: ABNORMAL
EOSINOPHIL # BLD: 0.2 K/UL (ref 0–0.4)
EOSINOPHIL NFR BLD: 2 % (ref 0–7)
EPITH CASTS URNS QL MICRO: NORMAL /LPF
ERYTHROCYTE [DISTWIDTH] IN BLOOD BY AUTOMATED COUNT: 12.1 % (ref 11.5–14.5)
GLOBULIN SER CALC-MCNC: 3.8 G/DL (ref 2–4)
GLUCOSE SERPL-MCNC: 89 MG/DL (ref 65–100)
GLUCOSE UR STRIP.AUTO-MCNC: NEGATIVE MG/DL
HCG UR QL: NEGATIVE
HCT VFR BLD AUTO: 39.5 % (ref 35–47)
HGB BLD-MCNC: 13.3 G/DL (ref 11.5–16)
HGB UR QL STRIP: NEGATIVE
IMM GRANULOCYTES # BLD AUTO: 0 K/UL (ref 0–0.04)
IMM GRANULOCYTES NFR BLD AUTO: 0 % (ref 0–0.5)
KETONES UR QL STRIP.AUTO: NEGATIVE MG/DL
LEUKOCYTE ESTERASE UR QL STRIP.AUTO: NEGATIVE
LYMPHOCYTES # BLD: 4.1 K/UL (ref 0.8–3.5)
LYMPHOCYTES NFR BLD: 42 % (ref 12–49)
MCH RBC QN AUTO: 31.6 PG (ref 26–34)
MCHC RBC AUTO-ENTMCNC: 33.7 G/DL (ref 30–36.5)
MCV RBC AUTO: 93.8 FL (ref 80–99)
MONOCYTES # BLD: 0.8 K/UL (ref 0–1)
MONOCYTES NFR BLD: 8 % (ref 5–13)
NEUTS SEG # BLD: 4.6 K/UL (ref 1.8–8)
NEUTS SEG NFR BLD: 48 % (ref 32–75)
NITRITE UR QL STRIP.AUTO: NEGATIVE
NRBC # BLD: 0 K/UL (ref 0–0.01)
NRBC BLD-RTO: 0 PER 100 WBC
PH UR STRIP: 5.5 (ref 5–8)
PLATELET # BLD AUTO: 407 K/UL (ref 150–400)
PMV BLD AUTO: 8.7 FL (ref 8.9–12.9)
POTASSIUM SERPL-SCNC: 4 MMOL/L (ref 3.5–5.1)
PROT SERPL-MCNC: 7.4 G/DL (ref 6.4–8.2)
PROT UR STRIP-MCNC: NEGATIVE MG/DL
RBC # BLD AUTO: 4.21 M/UL (ref 3.8–5.2)
RBC #/AREA URNS HPF: NORMAL /HPF (ref 0–5)
SODIUM SERPL-SCNC: 139 MMOL/L (ref 136–145)
SP GR UR REFRACTOMETRY: 1.01
URINE CULTURE IF INDICATED: NORMAL
UROBILINOGEN UR QL STRIP.AUTO: 0.2 EU/DL (ref 0.2–1)
WBC # BLD AUTO: 9.7 K/UL (ref 3.6–11)
WBC URNS QL MICRO: NORMAL /HPF (ref 0–4)

## 2024-01-23 PROCEDURE — 99284 EMERGENCY DEPT VISIT MOD MDM: CPT

## 2024-01-23 PROCEDURE — 81001 URINALYSIS AUTO W/SCOPE: CPT

## 2024-01-23 PROCEDURE — 6370000000 HC RX 637 (ALT 250 FOR IP): Performed by: NURSE PRACTITIONER

## 2024-01-23 PROCEDURE — 80053 COMPREHEN METABOLIC PANEL: CPT

## 2024-01-23 PROCEDURE — 85025 COMPLETE CBC W/AUTO DIFF WBC: CPT

## 2024-01-23 PROCEDURE — 81025 URINE PREGNANCY TEST: CPT

## 2024-01-23 PROCEDURE — 36415 COLL VENOUS BLD VENIPUNCTURE: CPT

## 2024-01-23 PROCEDURE — 74018 RADEX ABDOMEN 1 VIEW: CPT

## 2024-01-23 RX ORDER — POLYETHYLENE GLYCOL 3350 17 G/17G
17 POWDER, FOR SOLUTION ORAL DAILY PRN
Qty: 527 G | Refills: 0 | Status: SHIPPED | OUTPATIENT
Start: 2024-01-23 | End: 2024-02-22

## 2024-01-23 RX ADMIN — MAGNESIUM CITRATE 296 ML: 1.75 LIQUID ORAL at 22:06

## 2024-01-23 ASSESSMENT — PAIN DESCRIPTION - LOCATION: LOCATION: ABDOMEN

## 2024-01-23 ASSESSMENT — LIFESTYLE VARIABLES
HOW MANY STANDARD DRINKS CONTAINING ALCOHOL DO YOU HAVE ON A TYPICAL DAY: PATIENT DOES NOT DRINK
HOW OFTEN DO YOU HAVE A DRINK CONTAINING ALCOHOL: NEVER

## 2024-01-23 ASSESSMENT — PAIN - FUNCTIONAL ASSESSMENT: PAIN_FUNCTIONAL_ASSESSMENT: 0-10

## 2024-01-23 ASSESSMENT — PAIN SCALES - GENERAL: PAINLEVEL_OUTOF10: 8

## 2024-01-24 NOTE — ED NOTES
Pt presents to ED ambulatory w/ complaints of lower quadrant abdominal pain x 1 month.  Pt states she has had intermittent abdominal pain, and constipation.  Pt denies N/V/D.     Pt RR even and unlabored  Pt chest rise and fall symmetrical  Pt in NAD    Emergency Department Nursing Plan of Care      The Nursing Plan of Care is developed from the Nursing assessment and Emergency Department Attending provider initial evaluation.  The plan of care may be reviewed in the “ED Provider note”.    The Plan of Care was developed with the following considerations:  Patient / Family readiness to learn indicated by:verbalized understanding  Persons(s) to be included in education: patient  Barriers to Learning/Limitations:None    Signed    Chris Dangelo, RN    1/23/2024   8:50 PM

## 2024-01-24 NOTE — ED NOTES
Discharge instructions were given to the patient by Chris FAIRCHILD    The patient left the Emergency Department ambulatory, alert and oriented and in no acute distress with 1 prescriptions. The patient was encouraged to call or return to the ED for worsening issues or problems and was encouraged to schedule a follow up appointment for continuing care.    The patient verbalized understanding of discharge instructions and prescriptions, all questions were answered. The patient has no further concerns at this time.

## 2024-01-24 NOTE — ED TRIAGE NOTES
Pt presents to ED with lower quadrant abd pain and constipation. Pt reports irregular bowel movements for greater than a month. She also states she has heavy periods accompanied with weakness and lethargy. She is concerned she might be anemic.

## 2024-01-24 NOTE — ED PROVIDER NOTES
Range    Sodium 139 136 - 145 mmol/L    Potassium 4.0 3.5 - 5.1 mmol/L    Chloride 103 97 - 108 mmol/L    CO2 29 21 - 32 mmol/L    Anion Gap 7 5 - 15 mmol/L    Glucose 89 65 - 100 mg/dL    BUN 16 6 - 20 MG/DL    Creatinine 0.85 0.55 - 1.02 MG/DL    Bun/Cre Ratio 19 12 - 20      Est, Glom Filt Rate >60 >60 ml/min/1.73m2    Calcium 8.8 8.5 - 10.1 MG/DL    Total Bilirubin 0.1 (L) 0.2 - 1.0 MG/DL    ALT 34 12 - 78 U/L    AST 34 15 - 37 U/L    Alk Phosphatase 49 45 - 117 U/L    Total Protein 7.4 6.4 - 8.2 g/dL    Albumin 3.6 3.5 - 5.0 g/dL    Globulin 3.8 2.0 - 4.0 g/dL    Albumin/Globulin Ratio 0.9 (L) 1.1 - 2.2     Urinalysis with Reflex to Culture    Collection Time: 01/23/24  8:34 PM    Specimen: Urine   Result Value Ref Range    Color, UA YELLOW/STRAW      Appearance CLEAR CLEAR      Specific Gravity, UA 1.015      pH, Urine 5.5 5.0 - 8.0      Protein, UA Negative NEG mg/dL    Glucose, UA Negative NEG mg/dL    Ketones, Urine Negative NEG mg/dL    Bilirubin Urine Negative NEG      Blood, Urine Negative NEG      Urobilinogen, Urine 0.2 0.2 - 1.0 EU/dL    Nitrite, Urine Negative NEG      Leukocyte Esterase, Urine Negative NEG      WBC, UA 0-4 0 - 4 /hpf    RBC, UA 0-5 0 - 5 /hpf    Epithelial Cells UA FEW FEW /lpf    BACTERIA, URINE Negative NEG /hpf    Urine Culture if Indicated CULTURE NOT INDICATED BY UA RESULT CNI     POC Pregnancy Urine Qual    Collection Time: 01/23/24  8:45 PM   Result Value Ref Range    Preg Test, Ur Negative NEG           EKG: When ordered, EKG's are interpreted by the Emergency Department Physician in the absence of a cardiologist.  Please see their note for interpretation of EKG.      RADIOLOGY:  Non-plain film images such as CT, Ultrasound and MRI are read by the radiologist. Plain radiographic images are visualized and preliminarily interpreted by the ED Provider with the below findings:        Interpretation per the Radiologist below, if available at the time of this note:     XR ABDOMEN

## 2024-01-24 NOTE — DISCHARGE INSTRUCTIONS
It was a pleasure taking care of you at [facility] Emergency Department today.  We know that when you come to [facility], you are entrusting us with your health, comfort, and safety.  Our physicians and nurses honor that trust, and we truly appreciate the opportunity to care for you and your loved ones.      We also value our feedback.  If you receive a survey about your Emergency Department experience today, please fill it out.  We care about our patients' feedback, and we listen to what you have to say.  Thank you!

## 2024-04-18 ENCOUNTER — HOSPITAL ENCOUNTER (EMERGENCY)
Facility: HOSPITAL | Age: 29
Discharge: HOME | End: 2024-04-18
Attending: STUDENT IN AN ORGANIZED HEALTH CARE EDUCATION/TRAINING PROGRAM

## 2024-04-18 VITALS
BODY MASS INDEX: 39.56 KG/M2 | OXYGEN SATURATION: 98 % | HEIGHT: 62 IN | SYSTOLIC BLOOD PRESSURE: 122 MMHG | DIASTOLIC BLOOD PRESSURE: 77 MMHG | HEART RATE: 84 BPM | RESPIRATION RATE: 16 BRPM | TEMPERATURE: 97.7 F | WEIGHT: 215 LBS

## 2024-04-18 DIAGNOSIS — K62.5 RECTAL BLEEDING: Primary | ICD-10-CM

## 2024-04-18 LAB
ALBUMIN SERPL-MCNC: 3.3 G/DL (ref 3.5–5)
ALBUMIN/GLOB SERPL: 0.9 (ref 1.1–2.2)
ALP SERPL-CCNC: 44 U/L (ref 45–117)
ALT SERPL-CCNC: 36 U/L (ref 12–78)
ANION GAP SERPL CALC-SCNC: 9 MMOL/L (ref 5–15)
AST SERPL-CCNC: 26 U/L (ref 15–37)
BASOPHILS # BLD: 0 K/UL (ref 0–0.1)
BASOPHILS NFR BLD: 0 % (ref 0–1)
BILIRUB SERPL-MCNC: 0.2 MG/DL (ref 0.2–1)
BUN SERPL-MCNC: 15 MG/DL (ref 6–20)
BUN/CREAT SERPL: 13 (ref 12–20)
CALCIUM SERPL-MCNC: 8.8 MG/DL (ref 8.5–10.1)
CHLORIDE SERPL-SCNC: 104 MMOL/L (ref 97–108)
CO2 SERPL-SCNC: 27 MMOL/L (ref 21–32)
COMMENT:: NORMAL
CREAT SERPL-MCNC: 1.12 MG/DL (ref 0.55–1.02)
DIFFERENTIAL METHOD BLD: ABNORMAL
EKG ATRIAL RATE: 70 BPM
EKG DIAGNOSIS: NORMAL
EKG P AXIS: 37 DEGREES
EKG P-R INTERVAL: 160 MS
EKG Q-T INTERVAL: 392 MS
EKG QRS DURATION: 66 MS
EKG QTC CALCULATION (BAZETT): 423 MS
EKG R AXIS: 54 DEGREES
EKG T AXIS: 45 DEGREES
EKG VENTRICULAR RATE: 70 BPM
EOSINOPHIL # BLD: 0.1 K/UL (ref 0–0.4)
EOSINOPHIL NFR BLD: 1 % (ref 0–7)
ERYTHROCYTE [DISTWIDTH] IN BLOOD BY AUTOMATED COUNT: 12.9 % (ref 11.5–14.5)
GLOBULIN SER CALC-MCNC: 3.5 G/DL (ref 2–4)
GLUCOSE SERPL-MCNC: 93 MG/DL (ref 65–100)
HCT VFR BLD AUTO: 35.6 % (ref 35–47)
HGB BLD-MCNC: 11.7 G/DL (ref 11.5–16)
IMM GRANULOCYTES # BLD AUTO: 0 K/UL (ref 0–0.04)
IMM GRANULOCYTES NFR BLD AUTO: 0 % (ref 0–0.5)
LYMPHOCYTES # BLD: 4.2 K/UL (ref 0.8–3.5)
LYMPHOCYTES NFR BLD: 44 % (ref 12–49)
MCH RBC QN AUTO: 31.7 PG (ref 26–34)
MCHC RBC AUTO-ENTMCNC: 32.9 G/DL (ref 30–36.5)
MCV RBC AUTO: 96.5 FL (ref 80–99)
MONOCYTES # BLD: 0.7 K/UL (ref 0–1)
MONOCYTES NFR BLD: 7 % (ref 5–13)
NEUTS SEG # BLD: 4.5 K/UL (ref 1.8–8)
NEUTS SEG NFR BLD: 48 % (ref 32–75)
NRBC # BLD: 0 K/UL (ref 0–0.01)
NRBC BLD-RTO: 0 PER 100 WBC
PLATELET # BLD AUTO: 378 K/UL (ref 150–400)
PMV BLD AUTO: 8.6 FL (ref 8.9–12.9)
POTASSIUM SERPL-SCNC: 4.2 MMOL/L (ref 3.5–5.1)
PROT SERPL-MCNC: 6.8 G/DL (ref 6.4–8.2)
RBC # BLD AUTO: 3.69 M/UL (ref 3.8–5.2)
SODIUM SERPL-SCNC: 140 MMOL/L (ref 136–145)
SPECIMEN HOLD: NORMAL
WBC # BLD AUTO: 9.5 K/UL (ref 3.6–11)

## 2024-04-18 PROCEDURE — 80053 COMPREHEN METABOLIC PANEL: CPT

## 2024-04-18 PROCEDURE — 85025 COMPLETE CBC W/AUTO DIFF WBC: CPT

## 2024-04-18 PROCEDURE — 99284 EMERGENCY DEPT VISIT MOD MDM: CPT

## 2024-04-18 RX ORDER — HYDROCORTISONE ACETATE 25 MG/1
25 SUPPOSITORY RECTAL 2 TIMES DAILY
Qty: 30 SUPPOSITORY | Refills: 0 | Status: SHIPPED | OUTPATIENT
Start: 2024-04-18 | End: 2024-05-03

## 2024-04-18 ASSESSMENT — LIFESTYLE VARIABLES
HOW OFTEN DO YOU HAVE A DRINK CONTAINING ALCOHOL: NEVER
HOW MANY STANDARD DRINKS CONTAINING ALCOHOL DO YOU HAVE ON A TYPICAL DAY: PATIENT DOES NOT DRINK

## 2024-04-18 ASSESSMENT — PAIN DESCRIPTION - ORIENTATION: ORIENTATION: LOWER

## 2024-04-18 ASSESSMENT — PAIN DESCRIPTION - DESCRIPTORS: DESCRIPTORS: ACHING

## 2024-04-18 ASSESSMENT — PAIN SCALES - GENERAL: PAINLEVEL_OUTOF10: 9

## 2024-04-18 ASSESSMENT — PAIN DESCRIPTION - LOCATION: LOCATION: ABDOMEN

## 2024-04-18 ASSESSMENT — PAIN DESCRIPTION - PAIN TYPE: TYPE: ACUTE PAIN

## 2024-04-18 ASSESSMENT — PAIN - FUNCTIONAL ASSESSMENT: PAIN_FUNCTIONAL_ASSESSMENT: 0-10

## 2024-04-19 NOTE — ED NOTES
Pt presents ambulatory to ED complaining of rectal bleeding x 04/12/24. Pt reports sometimes she strains during a bowel movement. Denies pain during bowel movement, but does having cramping. Pt reports dizziness and heart flutters during a bowel movement. Denies cardiac issues and hemorrhoids.     Pt is alert and oriented x 4, RR even and unlabored, skin is warm and dry. Assesment completed and pt updated on plan of care.       Emergency Department Nursing Plan of Care       The Nursing Plan of Care is developed from the Nursing assessment and Emergency Department Attending provider initial evaluation.  The plan of care may be reviewed in the “ED Provider note”.    The Plan of Care was developed with the following considerations:   Patient / Family readiness to learn indicated by:verbalized understanding  Persons(s) to be included in education: patient  Barriers to Learning/Limitations:None    Signed     Bobby Capellan RN    4/18/2024   10:59 PM

## 2024-04-19 NOTE — ED NOTES
Discharge instructions were given to the patient by Cornelius.     The patient left the Emergency Department alert and oriented and in no acute distress with 1 prescriptions. The patient was encouraged to call or return to the ED for worsening issues or problems and was encouraged to schedule a follow up appointment for continuing care.     Ambulation assessment completed before discharge.  Pt left Emergency Department ambulating at baseline with no ortho devices  Ortho device education: none    The patient verbalized understanding of discharge instructions and prescriptions, all questions were answered. The patient has no further concerns at this time.

## 2024-04-19 NOTE — ED TRIAGE NOTES
Problem: Patient Care Overview  Goal: Plan of Care Review  Outcome: Ongoing (interventions implemented as appropriate)  Pt states he is doing good except for some problems with his eyes       Pt c/o intermittent lower abdominal pain with rectal bleeding x 1 month - pt states it is bright red blood when she wipes.  Pt states she had an episode of dizziness this morning at work.

## 2024-04-19 NOTE — ED PROVIDER NOTES
Eosinophils Absolute 0.1 0.0 - 0.4 K/UL    Basophils Absolute 0.0 0.0 - 0.1 K/UL    Immature Granulocytes Absolute 0.0 0.00 - 0.04 K/UL    Differential Type AUTOMATED     CMP    Collection Time: 04/18/24 10:49 PM   Result Value Ref Range    Sodium 140 136 - 145 mmol/L    Potassium 4.2 3.5 - 5.1 mmol/L    Chloride 104 97 - 108 mmol/L    CO2 27 21 - 32 mmol/L    Anion Gap 9 5 - 15 mmol/L    Glucose 93 65 - 100 mg/dL    BUN 15 6 - 20 MG/DL    Creatinine 1.12 (H) 0.55 - 1.02 MG/DL    Bun/Cre Ratio 13 12 - 20      Est, Glom Filt Rate 68 >60 ml/min/1.73m2    Calcium 8.8 8.5 - 10.1 MG/DL    Total Bilirubin 0.2 0.2 - 1.0 MG/DL    ALT 36 12 - 78 U/L    AST 26 15 - 37 U/L    Alk Phosphatase 44 (L) 45 - 117 U/L    Total Protein 6.8 6.4 - 8.2 g/dL    Albumin 3.3 (L) 3.5 - 5.0 g/dL    Globulin 3.5 2.0 - 4.0 g/dL    Albumin/Globulin Ratio 0.9 (L) 1.1 - 2.2     EKG 12 Lead    Collection Time: 04/18/24 11:30 PM   Result Value Ref Range    Ventricular Rate 70 BPM    Atrial Rate 70 BPM    P-R Interval 160 ms    QRS Duration 66 ms    Q-T Interval 392 ms    QTc Calculation (Bazett) 423 ms    P Axis 37 degrees    R Axis 54 degrees    T Axis 45 degrees    Diagnosis       Normal sinus rhythm  Normal ECG  When compared with ECG of 16-JUL-2023 21:05,  No significant change was found             EKG: Documented below        EMERGENCY DEPARTMENT COURSE and DIFFERENTIAL DIAGNOSIS/MDM   Vitals:    Vitals:    04/18/24 2141 04/18/24 2259 04/18/24 2302 04/18/24 2332   BP: (!) 154/82 121/80 122/77    Pulse: 84      Resp: 16      Temp: 97.7 °F (36.5 °C)      TempSrc: Temporal      SpO2: 98% 99% 97% 98%   Weight: 97.5 kg (215 lb)      Height: 1.575 m (5' 2\")               Patient was given the following medications:  Medications - No data to display    CONSULTS: (Who and What was discussed)  None      Chronic Conditions: None reported    Social Determinants affecting Dx or Tx: Patient does not have insurance.    Records Reviewed (source and summary

## 2024-04-22 LAB
EKG ATRIAL RATE: 70 BPM
EKG DIAGNOSIS: NORMAL
EKG P AXIS: 37 DEGREES
EKG P-R INTERVAL: 160 MS
EKG Q-T INTERVAL: 392 MS
EKG QRS DURATION: 66 MS
EKG QTC CALCULATION (BAZETT): 423 MS
EKG R AXIS: 54 DEGREES
EKG T AXIS: 45 DEGREES
EKG VENTRICULAR RATE: 70 BPM

## 2024-06-23 ENCOUNTER — HOSPITAL ENCOUNTER (EMERGENCY)
Facility: HOSPITAL | Age: 29
Discharge: HOME | End: 2024-06-23

## 2024-06-23 VITALS
HEART RATE: 95 BPM | DIASTOLIC BLOOD PRESSURE: 74 MMHG | RESPIRATION RATE: 16 BRPM | WEIGHT: 217 LBS | BODY MASS INDEX: 39.93 KG/M2 | OXYGEN SATURATION: 97 % | HEIGHT: 62 IN | SYSTOLIC BLOOD PRESSURE: 134 MMHG | TEMPERATURE: 99 F

## 2024-06-23 DIAGNOSIS — N39.0 URINARY TRACT INFECTION WITHOUT HEMATURIA, SITE UNSPECIFIED: Primary | ICD-10-CM

## 2024-06-23 DIAGNOSIS — B37.31 YEAST VAGINITIS: ICD-10-CM

## 2024-06-23 LAB
APPEARANCE UR: CLEAR
BACTERIA URNS QL MICRO: ABNORMAL /HPF
BILIRUB UR QL: NEGATIVE
CLUE CELLS VAG QL WET PREP: ABNORMAL
COLOR UR: ABNORMAL
EPITH CASTS URNS QL MICRO: ABNORMAL /LPF
GLUCOSE UR STRIP.AUTO-MCNC: NEGATIVE MG/DL
HCG UR QL: NEGATIVE
HGB UR QL STRIP: NEGATIVE
KETONES UR QL STRIP.AUTO: ABNORMAL MG/DL
LEUKOCYTE ESTERASE UR QL STRIP.AUTO: ABNORMAL
MUCOUS THREADS URNS QL MICRO: ABNORMAL /LPF
NITRITE UR QL STRIP.AUTO: NEGATIVE
PH UR STRIP: 6.5 (ref 5–8)
PROT UR STRIP-MCNC: NEGATIVE MG/DL
RBC #/AREA URNS HPF: ABNORMAL /HPF (ref 0–5)
SP GR UR REFRACTOMETRY: 1.02 (ref 1–1.03)
T VAGINALIS VAG QL WET PREP: ABNORMAL
URINE CULTURE IF INDICATED: ABNORMAL
UROBILINOGEN UR QL STRIP.AUTO: 1 EU/DL (ref 0.2–1)
WBC URNS QL MICRO: ABNORMAL /HPF (ref 0–4)
YEAST: ABNORMAL

## 2024-06-23 PROCEDURE — 99283 EMERGENCY DEPT VISIT LOW MDM: CPT

## 2024-06-23 PROCEDURE — 87491 CHLMYD TRACH DNA AMP PROBE: CPT

## 2024-06-23 PROCEDURE — 81001 URINALYSIS AUTO W/SCOPE: CPT

## 2024-06-23 PROCEDURE — 6370000000 HC RX 637 (ALT 250 FOR IP): Performed by: NURSE PRACTITIONER

## 2024-06-23 PROCEDURE — 87210 SMEAR WET MOUNT SALINE/INK: CPT

## 2024-06-23 PROCEDURE — 81025 URINE PREGNANCY TEST: CPT

## 2024-06-23 PROCEDURE — 87591 N.GONORRHOEAE DNA AMP PROB: CPT

## 2024-06-23 PROCEDURE — 87086 URINE CULTURE/COLONY COUNT: CPT

## 2024-06-23 RX ORDER — CEPHALEXIN 500 MG/1
500 CAPSULE ORAL
Status: COMPLETED | OUTPATIENT
Start: 2024-06-23 | End: 2024-06-23

## 2024-06-23 RX ORDER — FLUCONAZOLE 150 MG/1
150 TABLET ORAL ONCE
Qty: 1 TABLET | Refills: 0 | Status: SHIPPED | OUTPATIENT
Start: 2024-06-23 | End: 2024-06-23

## 2024-06-23 RX ORDER — CEPHALEXIN 500 MG/1
500 CAPSULE ORAL 3 TIMES DAILY
Qty: 21 CAPSULE | Refills: 0 | Status: SHIPPED | OUTPATIENT
Start: 2024-06-23 | End: 2024-06-30

## 2024-06-23 RX ADMIN — CEPHALEXIN 500 MG: 500 CAPSULE ORAL at 19:34

## 2024-06-23 ASSESSMENT — PAIN DESCRIPTION - LOCATION: LOCATION: ABDOMEN

## 2024-06-23 ASSESSMENT — PAIN - FUNCTIONAL ASSESSMENT: PAIN_FUNCTIONAL_ASSESSMENT: 0-10

## 2024-06-23 ASSESSMENT — PAIN DESCRIPTION - ORIENTATION: ORIENTATION: LEFT

## 2024-06-23 ASSESSMENT — PAIN SCALES - GENERAL: PAINLEVEL_OUTOF10: 8

## 2024-06-23 ASSESSMENT — PAIN DESCRIPTION - PAIN TYPE: TYPE: ACUTE PAIN

## 2024-06-23 NOTE — ED TRIAGE NOTES
Pt ambulatory to ED for c/o irregular periods after taking plan B in April, abdominal and ovarian pain with concerns for a cyst or possible pregnancy. LMP 5/5/24. Pt reports being diagnosed with a fatty liver and having frequent loose stools, pt requesting evaluation for both concerns as she is uncertain what is causing her pain.
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
## 2024-06-23 NOTE — ED NOTES
Pt presents ambulatory to ED complaining of intermittent abdominal cramping with occasional nausea. Pt reports taking a plan B in April and has not had a menstrual cycle since, pt reports prior she had regular cycles. LMP 5/5/24 per pt. Pt has loose stools but reports being dx with a fatty lover. Pt did an at home pregnancy test with a negative result today. Pt is concerned for cyst and/or pregnancy. Pt is alert and oriented x 4, RR even and unlabored, skin is warm and dry. Assessment completed and pt updated on plan of care.        Emergency Department Nursing Plan of Care        The Nursing Plan of Care is developed from the Nursing assessment and Emergency Department Attending provider initial evaluation.  The plan of care may be reviewed in the “ED Provider note”.

## 2024-06-24 LAB
BACTERIA SPEC CULT: NORMAL
C TRACH DNA SPEC QL NAA+PROBE: NEGATIVE
CC UR VC: NORMAL
N GONORRHOEA DNA SPEC QL NAA+PROBE: NEGATIVE
SAMPLE TYPE: NORMAL
SERVICE CMNT-IMP: NORMAL
SERVICE CMNT-IMP: NORMAL
SPECIMEN SOURCE: NORMAL

## 2024-06-25 ASSESSMENT — ENCOUNTER SYMPTOMS
SHORTNESS OF BREATH: 0
ABDOMINAL PAIN: 0
BACK PAIN: 0

## 2024-06-25 NOTE — ED PROVIDER NOTES
Should I take this medication?     * ibuprofen 800 MG tablet  Commonly known as: ADVIL;MOTRIN  Take 1 tablet by mouth 2 times daily as needed for Pain     * ibuprofen 600 MG tablet  Commonly known as: ADVIL;MOTRIN  Take 1 tablet by mouth 3 times daily as needed for Pain     naproxen 500 MG tablet  Commonly known as: NAPROSYN     ondansetron 4 MG disintegrating tablet  Commonly known as: ZOFRAN-ODT  Take 1 tablet by mouth 3 times daily as needed for Nausea or Vomiting           * This list has 2 medication(s) that are the same as other medications prescribed for you. Read the directions carefully, and ask your doctor or other care provider to review them with you.                   Where to Get Your Medications        These medications were sent to [id number] - [place], [place] - [address] - P [phone] - F [phone]  [address], [place] [address]      Phone: [phone]   cephALEXin 500 MG capsule  fluconazole 150 MG tablet           DISCONTINUED MEDICATIONS:  Discharge Medication List as of 6/23/2024  7:26 PM          I have seen and evaluated the patient autonomously. My supervision physician was on site and available for consultation if needed.     I am the Primary Clinician of Record.   TJ Kirby NP (electronically signed)    (Please note that parts of this dictation were completed with voice recognition software. Quite often unanticipated grammatical, syntax, homophones, and other interpretive errors are inadvertently transcribed by the computer software. Please disregards these errors. Please excuse any errors that have escaped final proofreading.)        Jessica Slater APRN - NP  06/25/24 1928

## 2024-08-25 ENCOUNTER — HOSPITAL ENCOUNTER (EMERGENCY)
Facility: HOSPITAL | Age: 29
Discharge: HOME | End: 2024-08-25
Attending: EMERGENCY MEDICINE

## 2024-08-25 VITALS
OXYGEN SATURATION: 97 % | WEIGHT: 215 LBS | BODY MASS INDEX: 39.56 KG/M2 | HEART RATE: 82 BPM | TEMPERATURE: 98.6 F | HEIGHT: 62 IN | DIASTOLIC BLOOD PRESSURE: 77 MMHG | RESPIRATION RATE: 18 BRPM | SYSTOLIC BLOOD PRESSURE: 134 MMHG

## 2024-08-25 DIAGNOSIS — R10.2 PELVIC PAIN: Primary | ICD-10-CM

## 2024-08-25 DIAGNOSIS — J01.90 ACUTE SINUSITIS, RECURRENCE NOT SPECIFIED, UNSPECIFIED LOCATION: ICD-10-CM

## 2024-08-25 LAB
APPEARANCE UR: CLEAR
BACTERIA URNS QL MICRO: NEGATIVE /HPF
BILIRUB UR QL: NEGATIVE
CLUE CELLS VAG QL WET PREP: NORMAL
COLOR UR: NORMAL
EPITH CASTS URNS QL MICRO: NORMAL /LPF
GLUCOSE UR STRIP.AUTO-MCNC: NEGATIVE MG/DL
HCG UR QL: NEGATIVE
HGB UR QL STRIP: NEGATIVE
KETONES UR QL STRIP.AUTO: NEGATIVE MG/DL
LEUKOCYTE ESTERASE UR QL STRIP.AUTO: NEGATIVE
NITRITE UR QL STRIP.AUTO: NEGATIVE
PH UR STRIP: 5.5 (ref 5–8)
PROT UR STRIP-MCNC: NEGATIVE MG/DL
RBC #/AREA URNS HPF: NORMAL /HPF (ref 0–5)
SP GR UR REFRACTOMETRY: 1.01
T VAGINALIS VAG QL WET PREP: NORMAL
URINE CULTURE IF INDICATED: NORMAL
UROBILINOGEN UR QL STRIP.AUTO: 0.2 EU/DL (ref 0.2–1)
WBC URNS QL MICRO: NORMAL /HPF (ref 0–4)
YEAST: NORMAL

## 2024-08-25 PROCEDURE — 99283 EMERGENCY DEPT VISIT LOW MDM: CPT

## 2024-08-25 PROCEDURE — 87491 CHLMYD TRACH DNA AMP PROBE: CPT

## 2024-08-25 PROCEDURE — 81025 URINE PREGNANCY TEST: CPT

## 2024-08-25 PROCEDURE — 87210 SMEAR WET MOUNT SALINE/INK: CPT

## 2024-08-25 PROCEDURE — 81001 URINALYSIS AUTO W/SCOPE: CPT

## 2024-08-25 PROCEDURE — 87591 N.GONORRHOEAE DNA AMP PROB: CPT

## 2024-08-25 RX ORDER — FLUTICASONE PROPIONATE 50 MCG
1 SPRAY, SUSPENSION (ML) NASAL DAILY
Qty: 32 G | Refills: 0 | Status: SHIPPED | OUTPATIENT
Start: 2024-08-25

## 2024-08-25 ASSESSMENT — PAIN DESCRIPTION - ORIENTATION: ORIENTATION: LOWER

## 2024-08-25 ASSESSMENT — PAIN DESCRIPTION - DESCRIPTORS: DESCRIPTORS: ACHING

## 2024-08-25 ASSESSMENT — PAIN DESCRIPTION - LOCATION: LOCATION: PELVIS

## 2024-08-25 ASSESSMENT — PAIN - FUNCTIONAL ASSESSMENT: PAIN_FUNCTIONAL_ASSESSMENT: 0-10

## 2024-08-25 ASSESSMENT — PAIN SCALES - GENERAL: PAINLEVEL_OUTOF10: 8
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
## 2024-08-25 NOTE — ED NOTES
Discharge instructions were given to the patient by KYLE LAUGHLIN RN.     The patient left the Emergency Department alert and oriented and in no acute distress with 2 prescriptions. The patient was encouraged to call or return to the ED for worsening issues or problems and was encouraged to schedule a follow up appointment for continuing care.     Ambulation assessment completed before discharge.  Pt left Emergency Department ambulating at baseline with no ortho devices  Ortho device education: none    The patient verbalized understanding of discharge instructions and prescriptions, all questions were answered. The patient has no further concerns at this time.

## 2024-11-28 ENCOUNTER — IMMUNIZATION (OUTPATIENT)
Age: 29
End: 2024-11-28

## 2025-07-26 ENCOUNTER — HOSPITAL ENCOUNTER (EMERGENCY)
Facility: HOSPITAL | Age: 30
Discharge: HOME | End: 2025-07-26
Payer: COMMERCIAL

## 2025-07-26 ENCOUNTER — APPOINTMENT (OUTPATIENT)
Facility: HOSPITAL | Age: 30
End: 2025-07-26
Payer: COMMERCIAL

## 2025-07-26 VITALS
RESPIRATION RATE: 16 BRPM | TEMPERATURE: 98 F | DIASTOLIC BLOOD PRESSURE: 96 MMHG | OXYGEN SATURATION: 99 % | SYSTOLIC BLOOD PRESSURE: 141 MMHG | HEART RATE: 80 BPM

## 2025-07-26 DIAGNOSIS — S93.402A SPRAIN OF LIGAMENT OF LEFT ANKLE, INITIAL ENCOUNTER: Primary | ICD-10-CM

## 2025-07-26 PROCEDURE — 99283 EMERGENCY DEPT VISIT LOW MDM: CPT

## 2025-07-26 PROCEDURE — 29515 APPLICATION SHORT LEG SPLINT: CPT

## 2025-07-26 PROCEDURE — 6370000000 HC RX 637 (ALT 250 FOR IP): Performed by: NURSE PRACTITIONER

## 2025-07-26 PROCEDURE — 73600 X-RAY EXAM OF ANKLE: CPT

## 2025-07-26 RX ORDER — IBUPROFEN 400 MG/1
800 TABLET, FILM COATED ORAL
Status: COMPLETED | OUTPATIENT
Start: 2025-07-26 | End: 2025-07-26

## 2025-07-26 RX ORDER — DICLOFENAC SODIUM 75 MG/1
75 TABLET, DELAYED RELEASE ORAL 2 TIMES DAILY
Qty: 30 TABLET | Refills: 3 | Status: SHIPPED | OUTPATIENT
Start: 2025-07-26

## 2025-07-26 RX ADMIN — IBUPROFEN 800 MG: 400 TABLET, FILM COATED ORAL at 22:13

## 2025-07-27 NOTE — ED TRIAGE NOTES
Mild twisting injury to left ankle on 7/12/25 while getting off a bus.  Minor swelling noted.  Ambulates without limping.

## 2025-07-27 NOTE — ED PROVIDER NOTES
[facility] EMERGENCY DEPARTMENT  EMERGENCY DEPARTMENT ENCOUNTER       Pt Name: Brook Martinez  MRN: [id number]  Birthdate 1995  Date of evaluation: 7/26/2025  Provider: TJ Kirby NP   PCP: None, None  Note Started: 9:53 PM 7/26/25     CHIEF COMPLAINT       Chief Complaint   Patient presents with    Ankle Pain        HISTORY OF PRESENT ILLNESS: 1 or more elements      History Provided by: Patient   History is limited by: Nothing     Brook Martinez is a 30 y.o. female who presents cc left ankle pain acute onset July 12.  States she twisted her ankle while getting off a bus.  States the ankle has been swollen.  And it has been painful to walk.  States she was not previously evaluated for the pain.     Nursing Notes were all reviewed and agreed with or any disagreements were addressed in the HPI.     REVIEW OF SYSTEMS      Review of Systems   Constitutional:  Negative for fever.   HENT:  Negative for congestion.    Respiratory:  Negative for shortness of breath.    Cardiovascular:  Negative for chest pain.   Gastrointestinal:  Negative for abdominal pain.   Musculoskeletal:  Negative for back pain and neck pain.        Ankle pain   Skin:  Negative for rash.   Neurological:  Positive for dizziness. Negative for weakness and headaches.   Psychiatric/Behavioral:  Positive for behavioral problems.    All other systems reviewed and are negative.       Positives and Pertinent negatives as per HPI.    PAST HISTORY     Past Medical History:  Past Medical History:   Diagnosis Date    Ill-defined condition     hx dislocated shoulder       Past Surgical History:  History reviewed. No pertinent surgical history.    Family History:  Family History   Problem Relation Age of Onset    Hypertension Father     Diabetes Mother        Social History:  Social History     Tobacco Use    Smoking status: Never    Smokeless tobacco: Never   Substance Use Topics    Alcohol use: Yes     Comment: occasional    Drug use:

## 2025-07-27 NOTE — ED NOTES
Pt presents to ED complaining of left ankle pain and swelling x 2 weeks. Pt states she lost balance while getting off a bus last 7/12/2025 and states she fell on her left side and endorses she twisted her ankle. Pt states she applied ice and elevated the affected part at home. Pt reports taking Epsom salt and Tylenol with minimal relief. Pt is alert and oriented x 4, RR even and unlabored, skin is warm and dry. Pt appears in NAD at this time. Assessment completed and pt updated on plan of care.  Call bell in reach.  Emergency Department Nursing Plan of Care  The Nursing Plan of Care is developed from the Nursing assessment and Emergency Department Attending provider initial evaluation.  The plan of care may be reviewed in the “ED Provider note”.  The Plan of Care was developed with the following considerations:  Patient / Family readiness to learn indicated by:Refer to Medical chart in [facility]  Persons(s) to be included in education: Refer to Medical chart in [facility]  Barriers to Learning/Limitations:Normal

## 2025-07-27 NOTE — DISCHARGE INSTRUCTIONS
You have been diagnosed with an ankle sprain.  Prescribed Voltaren for pain.  You could also take Tylenol in addition to the Voltaren.  Keep your leg elevated when at home.  I have given you an orthopedic follow-up if symptoms do not improve in 1 week.  You can soak your left foot and ankle in Epsom salt and warm water.  You can take your brace off when in bed.

## 2025-07-27 NOTE — ED NOTES
Discharge instructions were given to the patient by Jessica Slater NP.  The patient left the Emergency Department alert and oriented and in no acute distress with 1 prescription(s). The patient was encouraged to call or return to the ED for worsening issues or problems and was encouraged to schedule a follow up appointment for continuing care.  The patient verbalized understanding of discharge instructions and prescriptions; all questions were answered. The patient has no further concerns at this time.

## (undated) LAB
DEPRECATED S PYO AG THROAT QL EIA: NEGATIVE
FLUAV RNA SPEC QL NAA+PROBE: NOT DETECTED
FLUBV RNA SPEC QL NAA+PROBE: NOT DETECTED
SARS-COV-2 RNA RESP QL NAA+PROBE: NOT DETECTED

## (undated) DEVICE — TUBING HYDR IRR --

## (undated) DEVICE — FORCEPS BX L240CM JAW DIA2.8MM L CAP W/ NDL MIC MESH TOOTH